# Patient Record
Sex: FEMALE
[De-identification: names, ages, dates, MRNs, and addresses within clinical notes are randomized per-mention and may not be internally consistent; named-entity substitution may affect disease eponyms.]

---

## 2018-04-29 PROBLEM — Z00.00 ENCOUNTER FOR PREVENTIVE HEALTH EXAMINATION: Status: ACTIVE | Noted: 2018-04-29

## 2018-05-08 ENCOUNTER — LABORATORY RESULT (OUTPATIENT)
Age: 43
End: 2018-05-08

## 2018-05-08 ENCOUNTER — APPOINTMENT (OUTPATIENT)
Dept: NEPHROLOGY | Facility: CLINIC | Age: 43
End: 2018-05-08
Payer: SELF-PAY

## 2018-05-08 VITALS — WEIGHT: 122 LBS | OXYGEN SATURATION: 98 % | HEART RATE: 74 BPM | BODY MASS INDEX: 26.32 KG/M2 | HEIGHT: 57 IN

## 2018-05-08 VITALS — HEART RATE: 72 BPM | SYSTOLIC BLOOD PRESSURE: 102 MMHG | DIASTOLIC BLOOD PRESSURE: 70 MMHG

## 2018-05-08 VITALS — HEART RATE: 84 BPM | DIASTOLIC BLOOD PRESSURE: 66 MMHG | SYSTOLIC BLOOD PRESSURE: 102 MMHG

## 2018-05-08 VITALS — SYSTOLIC BLOOD PRESSURE: 100 MMHG | HEART RATE: 84 BPM | DIASTOLIC BLOOD PRESSURE: 70 MMHG

## 2018-05-08 VITALS — HEIGHT: 59 IN | BODY MASS INDEX: 24.32 KG/M2 | WEIGHT: 120.63 LBS

## 2018-05-08 DIAGNOSIS — R21 RASH AND OTHER NONSPECIFIC SKIN ERUPTION: ICD-10-CM

## 2018-05-08 PROCEDURE — 93000 ELECTROCARDIOGRAM COMPLETE: CPT

## 2018-05-08 PROCEDURE — 99205 OFFICE O/P NEW HI 60 MIN: CPT | Mod: 25

## 2018-05-29 ENCOUNTER — LABORATORY RESULT (OUTPATIENT)
Age: 43
End: 2018-05-29

## 2018-05-29 ENCOUNTER — APPOINTMENT (OUTPATIENT)
Dept: NEUROLOGY | Facility: CLINIC | Age: 43
End: 2018-05-29
Payer: MEDICAID

## 2018-05-29 VITALS
HEART RATE: 57 BPM | OXYGEN SATURATION: 100 % | SYSTOLIC BLOOD PRESSURE: 128 MMHG | WEIGHT: 120 LBS | DIASTOLIC BLOOD PRESSURE: 84 MMHG | BODY MASS INDEX: 24.19 KG/M2 | HEIGHT: 59 IN | TEMPERATURE: 98.1 F

## 2018-05-29 DIAGNOSIS — Z81.8 FAMILY HISTORY OF OTHER MENTAL AND BEHAVIORAL DISORDERS: ICD-10-CM

## 2018-05-29 DIAGNOSIS — Z82.49 FAMILY HISTORY OF ISCHEMIC HEART DISEASE AND OTHER DISEASES OF THE CIRCULATORY SYSTEM: ICD-10-CM

## 2018-05-29 DIAGNOSIS — Z78.9 OTHER SPECIFIED HEALTH STATUS: ICD-10-CM

## 2018-05-29 DIAGNOSIS — Z84.89 FAMILY HISTORY OF OTHER SPECIFIED CONDITIONS: ICD-10-CM

## 2018-05-29 DIAGNOSIS — Z83.3 FAMILY HISTORY OF DIABETES MELLITUS: ICD-10-CM

## 2018-05-29 DIAGNOSIS — Z82.0 FAMILY HISTORY OF EPILEPSY AND OTHER DISEASES OF THE NERVOUS SYSTEM: ICD-10-CM

## 2018-05-29 DIAGNOSIS — Z82.3 FAMILY HISTORY OF STROKE: ICD-10-CM

## 2018-05-29 DIAGNOSIS — Z80.42 FAMILY HISTORY OF MALIGNANT NEOPLASM OF PROSTATE: ICD-10-CM

## 2018-05-29 DIAGNOSIS — Z83.49 FAMILY HISTORY OF OTHER ENDOCRINE, NUTRITIONAL AND METABOLIC DISEASES: ICD-10-CM

## 2018-05-29 PROCEDURE — 99204 OFFICE O/P NEW MOD 45 MIN: CPT

## 2018-05-29 RX ORDER — BUPROPION HYDROCHLORIDE 300 MG/1
300 TABLET, EXTENDED RELEASE ORAL DAILY
Refills: 0 | Status: ACTIVE | COMMUNITY

## 2018-05-29 RX ORDER — THYROID, PORCINE 15 MG/1
15 TABLET ORAL
Refills: 0 | Status: ACTIVE | COMMUNITY

## 2018-05-29 RX ORDER — IVABRADINE 5 MG/1
5 TABLET, FILM COATED ORAL
Refills: 0 | Status: ACTIVE | COMMUNITY

## 2018-06-20 ENCOUNTER — APPOINTMENT (OUTPATIENT)
Dept: NEUROLOGY | Facility: CLINIC | Age: 43
End: 2018-06-20
Payer: MEDICAID

## 2018-06-20 PROCEDURE — 95819 EEG AWAKE AND ASLEEP: CPT

## 2018-06-21 PROCEDURE — 95953: CPT

## 2018-06-22 ENCOUNTER — APPOINTMENT (OUTPATIENT)
Dept: NEUROLOGY | Facility: CLINIC | Age: 43
End: 2018-06-22

## 2018-06-22 PROCEDURE — 95953: CPT

## 2018-06-29 ENCOUNTER — APPOINTMENT (OUTPATIENT)
Dept: NEUROLOGY | Facility: CLINIC | Age: 43
End: 2018-06-29
Payer: MEDICAID

## 2018-06-29 PROCEDURE — 96118: CPT

## 2018-07-06 ENCOUNTER — APPOINTMENT (OUTPATIENT)
Dept: NEUROLOGY | Facility: CLINIC | Age: 43
End: 2018-07-06
Payer: MEDICAID

## 2018-07-06 PROCEDURE — 96118: CPT

## 2018-08-02 ENCOUNTER — APPOINTMENT (OUTPATIENT)
Dept: NEPHROLOGY | Facility: CLINIC | Age: 43
End: 2018-08-02
Payer: SELF-PAY

## 2018-08-02 ENCOUNTER — APPOINTMENT (OUTPATIENT)
Dept: ENDOCRINOLOGY | Facility: CLINIC | Age: 43
End: 2018-08-02
Payer: MEDICAID

## 2018-08-02 VITALS — OXYGEN SATURATION: 98 % | HEART RATE: 75 BPM | BODY MASS INDEX: 24.04 KG/M2 | WEIGHT: 119 LBS

## 2018-08-02 VITALS
WEIGHT: 120 LBS | DIASTOLIC BLOOD PRESSURE: 83 MMHG | BODY MASS INDEX: 24.19 KG/M2 | SYSTOLIC BLOOD PRESSURE: 124 MMHG | HEIGHT: 59 IN | HEART RATE: 79 BPM

## 2018-08-02 VITALS — HEART RATE: 84 BPM | SYSTOLIC BLOOD PRESSURE: 118 MMHG | DIASTOLIC BLOOD PRESSURE: 80 MMHG

## 2018-08-02 PROCEDURE — 99214 OFFICE O/P EST MOD 30 MIN: CPT | Mod: 25

## 2018-08-02 PROCEDURE — 36415 COLL VENOUS BLD VENIPUNCTURE: CPT

## 2018-08-02 PROCEDURE — 99205 OFFICE O/P NEW HI 60 MIN: CPT

## 2018-08-04 LAB
ALBUMIN SERPL ELPH-MCNC: 4.9 G/DL
ALDOSTERONE SERUM: <3 NG/DL
ALP BLD-CCNC: 50 U/L
ALT SERPL-CCNC: 9 U/L
ANION GAP SERPL CALC-SCNC: 16 MMOL/L
APPEARANCE: CLEAR
APTT BLD: 34.2 SEC
AST SERPL-CCNC: 17 U/L
BACTERIA: ABNORMAL
BASOPHILS # BLD AUTO: 0.01 K/UL
BASOPHILS NFR BLD AUTO: 0.1 %
BILIRUB SERPL-MCNC: 0.3 MG/DL
BILIRUBIN URINE: NEGATIVE
BLOOD URINE: NEGATIVE
BUN SERPL-MCNC: 9 MG/DL
CALCIUM SERPL-MCNC: 10.2 MG/DL
CHLORIDE SERPL-SCNC: 101 MMOL/L
CHOLEST SERPL-MCNC: 165 MG/DL
CHOLEST/HDLC SERPL: 2.4 RATIO
CO2 SERPL-SCNC: 21 MMOL/L
COLOR: YELLOW
CORTIS SERPL-MCNC: 9.5 UG/DL
CREAT SERPL-MCNC: 0.71 MG/DL
CRP SERPL-MCNC: 0.18 MG/DL
EOSINOPHIL # BLD AUTO: 0.09 K/UL
EOSINOPHIL NFR BLD AUTO: 1.1 %
ERYTHROCYTE [SEDIMENTATION RATE] IN BLOOD BY WESTERGREN METHOD: 5 MM/HR
GLUCOSE QUALITATIVE U: NEGATIVE MG/DL
GLUCOSE SERPL-MCNC: 81 MG/DL
HCT VFR BLD CALC: 40.2 %
HDLC SERPL-MCNC: 68 MG/DL
HGB BLD-MCNC: 12.9 G/DL
HYALINE CASTS: 1 /LPF
IMM GRANULOCYTES NFR BLD AUTO: 0.1 %
INR PPP: 1.01 RATIO
KETONES URINE: ABNORMAL
LDLC SERPL CALC-MCNC: 82 MG/DL
LEUKOCYTE ESTERASE URINE: ABNORMAL
LYMPHOCYTES # BLD AUTO: 2.15 K/UL
LYMPHOCYTES NFR BLD AUTO: 26.4 %
MAGNESIUM SERPL-MCNC: 2 MG/DL
MAN DIFF?: NORMAL
MCHC RBC-ENTMCNC: 26.1 PG
MCHC RBC-ENTMCNC: 32.1 GM/DL
MCV RBC AUTO: 81.4 FL
MICROSCOPIC-UA: NORMAL
MONOCYTES # BLD AUTO: 0.7 K/UL
MONOCYTES NFR BLD AUTO: 8.6 %
NEUTROPHILS # BLD AUTO: 5.17 K/UL
NEUTROPHILS NFR BLD AUTO: 63.7 %
NITRITE URINE: NEGATIVE
PH URINE: 7.5
PHOSPHATE SERPL-MCNC: 2.3 MG/DL
PLATELET # BLD AUTO: 284 K/UL
POTASSIUM SERPL-SCNC: 3.8 MMOL/L
PROT SERPL-MCNC: 8.2 G/DL
PROTEIN URINE: NEGATIVE MG/DL
PT BLD: 11.4 SEC
RBC # BLD: 4.94 M/UL
RBC # FLD: 14 %
RED BLOOD CELLS URINE: 0 /HPF
RENIN PLASMA: <2.1 PG/ML
SODIUM SERPL-SCNC: 138 MMOL/L
SPECIFIC GRAVITY URINE: 1.01
SQUAMOUS EPITHELIAL CELLS: 3 /HPF
TRIGL SERPL-MCNC: 77 MG/DL
URATE SERPL-MCNC: 4.6 MG/DL
UROBILINOGEN URINE: NEGATIVE MG/DL
WBC # FLD AUTO: 8.13 K/UL
WHITE BLOOD CELLS URINE: 1 /HPF

## 2018-08-07 ENCOUNTER — APPOINTMENT (OUTPATIENT)
Dept: NEUROLOGY | Facility: CLINIC | Age: 43
End: 2018-08-07
Payer: MEDICAID

## 2018-08-07 VITALS
WEIGHT: 120 LBS | DIASTOLIC BLOOD PRESSURE: 73 MMHG | HEIGHT: 59 IN | BODY MASS INDEX: 24.19 KG/M2 | SYSTOLIC BLOOD PRESSURE: 112 MMHG | OXYGEN SATURATION: 100 % | HEART RATE: 67 BPM | TEMPERATURE: 98.3 F

## 2018-08-07 PROCEDURE — 99214 OFFICE O/P EST MOD 30 MIN: CPT

## 2018-09-14 ENCOUNTER — RX RENEWAL (OUTPATIENT)
Age: 43
End: 2018-09-14

## 2018-11-08 ENCOUNTER — INPATIENT (INPATIENT)
Facility: HOSPITAL | Age: 43
LOS: 0 days | Discharge: HOME | DRG: 93 | End: 2018-11-09
Attending: PSYCHIATRY & NEUROLOGY | Admitting: PSYCHIATRY & NEUROLOGY
Payer: COMMERCIAL

## 2018-11-08 VITALS
OXYGEN SATURATION: 98 % | TEMPERATURE: 98 F | WEIGHT: 120.15 LBS | RESPIRATION RATE: 18 BRPM | SYSTOLIC BLOOD PRESSURE: 137 MMHG | DIASTOLIC BLOOD PRESSURE: 89 MMHG | HEART RATE: 81 BPM

## 2018-11-08 DIAGNOSIS — R20.0 ANESTHESIA OF SKIN: ICD-10-CM

## 2018-11-08 DIAGNOSIS — R00.0 TACHYCARDIA, UNSPECIFIED: ICD-10-CM

## 2018-11-08 DIAGNOSIS — G62.9 POLYNEUROPATHY, UNSPECIFIED: ICD-10-CM

## 2018-11-08 LAB
ALBUMIN SERPL ELPH-MCNC: 4.5 G/DL — SIGNIFICANT CHANGE UP (ref 3.3–5)
ALP SERPL-CCNC: 46 U/L — SIGNIFICANT CHANGE UP (ref 40–120)
ALT FLD-CCNC: 12 U/L — SIGNIFICANT CHANGE UP (ref 10–45)
ANION GAP SERPL CALC-SCNC: 13 MMOL/L — SIGNIFICANT CHANGE UP (ref 5–17)
APTT BLD: 34.5 SEC — SIGNIFICANT CHANGE UP (ref 27.5–36.3)
AST SERPL-CCNC: 16 U/L — SIGNIFICANT CHANGE UP (ref 10–40)
BASOPHILS NFR BLD AUTO: 0.2 % — SIGNIFICANT CHANGE UP (ref 0–2)
BILIRUB SERPL-MCNC: 0.2 MG/DL — SIGNIFICANT CHANGE UP (ref 0.2–1.2)
BUN SERPL-MCNC: 9 MG/DL — SIGNIFICANT CHANGE UP (ref 7–23)
CALCIUM SERPL-MCNC: 9.7 MG/DL — SIGNIFICANT CHANGE UP (ref 8.4–10.5)
CHLORIDE SERPL-SCNC: 98 MMOL/L — SIGNIFICANT CHANGE UP (ref 96–108)
CHOLEST SERPL-MCNC: 189 MG/DL — SIGNIFICANT CHANGE UP (ref 10–199)
CO2 SERPL-SCNC: 25 MMOL/L — SIGNIFICANT CHANGE UP (ref 22–31)
CREAT SERPL-MCNC: 0.67 MG/DL — SIGNIFICANT CHANGE UP (ref 0.5–1.3)
EOSINOPHIL NFR BLD AUTO: 1.1 % — SIGNIFICANT CHANGE UP (ref 0–6)
GLUCOSE SERPL-MCNC: 106 MG/DL — HIGH (ref 70–99)
HCT VFR BLD CALC: 36.5 % — SIGNIFICANT CHANGE UP (ref 34.5–45)
HDLC SERPL-MCNC: 81 MG/DL — SIGNIFICANT CHANGE UP
HGB BLD-MCNC: 11.7 G/DL — SIGNIFICANT CHANGE UP (ref 11.5–15.5)
INR BLD: 1.09 — SIGNIFICANT CHANGE UP (ref 0.88–1.16)
LIPID PNL WITH DIRECT LDL SERPL: 97 MG/DL — SIGNIFICANT CHANGE UP
LYMPHOCYTES # BLD AUTO: 31.5 % — SIGNIFICANT CHANGE UP (ref 13–44)
MCHC RBC-ENTMCNC: 25.7 PG — LOW (ref 27–34)
MCHC RBC-ENTMCNC: 32.1 G/DL — SIGNIFICANT CHANGE UP (ref 32–36)
MCV RBC AUTO: 80.2 FL — SIGNIFICANT CHANGE UP (ref 80–100)
MONOCYTES NFR BLD AUTO: 8.9 % — SIGNIFICANT CHANGE UP (ref 2–14)
NEUTROPHILS NFR BLD AUTO: 58.3 % — SIGNIFICANT CHANGE UP (ref 43–77)
PLATELET # BLD AUTO: 277 K/UL — SIGNIFICANT CHANGE UP (ref 150–400)
POTASSIUM SERPL-MCNC: 4.1 MMOL/L — SIGNIFICANT CHANGE UP (ref 3.5–5.3)
POTASSIUM SERPL-SCNC: 4.1 MMOL/L — SIGNIFICANT CHANGE UP (ref 3.5–5.3)
PROT SERPL-MCNC: 7.5 G/DL — SIGNIFICANT CHANGE UP (ref 6–8.3)
PROTHROM AB SERPL-ACNC: 12.4 SEC — SIGNIFICANT CHANGE UP (ref 10–12.9)
RBC # BLD: 4.55 M/UL — SIGNIFICANT CHANGE UP (ref 3.8–5.2)
RBC # FLD: 13.6 % — SIGNIFICANT CHANGE UP (ref 10.3–16.9)
SODIUM SERPL-SCNC: 136 MMOL/L — SIGNIFICANT CHANGE UP (ref 135–145)
TOTAL CHOLESTEROL/HDL RATIO MEASUREMENT: 2.3 RATIO — LOW (ref 3.3–7.1)
TRIGL SERPL-MCNC: 57 MG/DL — SIGNIFICANT CHANGE UP (ref 10–149)
TROPONIN T SERPL-MCNC: <0.01 NG/ML — SIGNIFICANT CHANGE UP (ref 0–0.01)
WBC # BLD: 8 K/UL — SIGNIFICANT CHANGE UP (ref 3.8–10.5)
WBC # FLD AUTO: 8 K/UL — SIGNIFICANT CHANGE UP (ref 3.8–10.5)

## 2018-11-08 PROCEDURE — 70496 CT ANGIOGRAPHY HEAD: CPT | Mod: 26

## 2018-11-08 PROCEDURE — 70450 CT HEAD/BRAIN W/O DYE: CPT | Mod: 26,59

## 2018-11-08 PROCEDURE — 70498 CT ANGIOGRAPHY NECK: CPT | Mod: 26

## 2018-11-08 PROCEDURE — 99285 EMERGENCY DEPT VISIT HI MDM: CPT | Mod: 25

## 2018-11-08 PROCEDURE — 93010 ELECTROCARDIOGRAM REPORT: CPT

## 2018-11-08 PROCEDURE — 0042T: CPT

## 2018-11-08 RX ORDER — SODIUM CHLORIDE 9 MG/ML
1000 INJECTION, SOLUTION INTRAVENOUS EVERY 12 HOURS
Qty: 0 | Refills: 0 | Status: DISCONTINUED | OUTPATIENT
Start: 2018-11-08 | End: 2018-11-09

## 2018-11-08 RX ORDER — ASPIRIN/CALCIUM CARB/MAGNESIUM 324 MG
81 TABLET ORAL DAILY
Qty: 0 | Refills: 0 | Status: DISCONTINUED | OUTPATIENT
Start: 2018-11-08 | End: 2018-11-09

## 2018-11-08 RX ADMIN — Medication 81 MILLIGRAM(S): at 23:37

## 2018-11-08 NOTE — ED PROVIDER NOTE - OBJECTIVE STATEMENT
Pt w/ PMHx POTS, Hashimoto's thyroiditis, depression p/w numbness onset 9 pm last night. Pt reports numbness of the R side of the face, from the level of the eyebrow to the chin. Pt reports family members noted her face looked funny at that time, unable to state how, and that she had some stuttering. Pt reports she ignored the sx and went to bed. This morning she felt better. Today, around 1 pm she felt the numbness get worse. Pt called Dr Aguilar this evening and was advised to come to the ED. No HA, neck pain, visual changes, extremity paresthesias, or focal weakness. Pt w/ PMHx POTS, Hashimoto's thyroiditis, depression, small fiber neuropathy s/p IVIG 2 months ago, p/w numbness onset 9 pm last night. Pt reports numbness of the R side of the face, from the level of the eyebrow to the chin. Pt reports family members noted her face looked funny at that time, unable to state how, and that she had some stuttering. Pt reports she ignored the sx and went to bed. This morning she felt better. Today, around 1 pm she felt the numbness get worse. Pt called Dr Aguilar this evening and was advised to come to the ED. No HA, neck pain, visual changes, extremity paresthesias, or focal weakness.

## 2018-11-08 NOTE — ED PROVIDER NOTE - PHYSICAL EXAMINATION
Constitutional: Well appearing, well nourished, awake, alert, oriented to person, place, time/situation and in no apparent distress.  ENMT: Airway patent. Normal MM  Eyes: Clear bilaterally  Cardiac: Normal rate, regular rhythm.  Heart sounds S1, S2.  No murmurs, rubs or gallops.  Respiratory: Breaths sounds equal and clear b/l. No increased WOB, tachypnea, hypoxia, or accessory mm use. Pt speaks in full sentences.   Gastrointestinal: Abd soft, NT, ND, NABS. No guarding, rebound, or rigidity. No pulsatile abdominal masses. No organomegaly appreciated. No CVAT   Musculoskeletal: Range of motion is not limited  Neuro: Alert & Oriented x 3. CN II-XII intact. No facial droop. Clear speech. PEOPLES w/ 5/5 strength x 4 ext. Mild dec sensation to R face V2-3. No pronator drift. No dysdidokinesia nor dysmetria. Normal heel-to-shin. Normal visual fields. NIHSS 1.  Skin: Skin normal color for race, warm, dry and intact. No evidence of rash.  Psych: Alert and oriented to person, place, time/situation. normal mood and affect. no apparent risk to self or others.

## 2018-11-08 NOTE — H&P ADULT - HISTORY OF PRESENT ILLNESS
42 y/o F with PMH POT syndrome and small fiber neuropathy. 44 y/o F with PMH POT syndrome and small fiber neuropathy who presents for right sided facial numbness starting at 9PM on 11/7. She noticed numbness from the eye brow to the chin felt numb, no associated pain, no visual changes, no dizziness. She reported that her face appeared to have a right droop. She took her BP and it was normal so she was not concerned and went to sleep. The next morning there was some resolution however it returned. She called Dr. Aguilar (her PCP) who directed her to go to the ED.     In the [facility] ED, patient's vitals were stable; afebrile, HR in the 80's, SBP to 130's and saturating well on room air. Her NIHSS was 1 for sensation discrepancy between right and left side of lower face. No associated droop. CT head negative for signs of acute transcortical stroke or hemorrhage. Patient +mild dizziness, tiredness, ongoing right sided numbness in her lower face. ROS negative for visual changes, ataxia, dysarthria or aphasia, nausea, vomiting. No similar episodes in the past.

## 2018-11-08 NOTE — H&P ADULT - ASSESSMENT
44 y/o F with PMH POT syndrome and small fiber neuropathy who presents with one day of persistent right sided facial numbness - NIHSS 1 and CT head negative for stroke - will admit for neurological work up.

## 2018-11-08 NOTE — STROKE CODE NOTE - NIH STROKE SCALE: 8. SENSORY, QM
(0) Normal; no sensory loss (1) Mild-to-moderate sensory loss; patient feels pinprick is less sharp or is dull on the affected side; or there is a loss of superficial pain with pinprick, but patient is aware of being touched

## 2018-11-08 NOTE — ED PROVIDER NOTE - MEDICAL DECISION MAKING DETAILS
Pt p/w numbness, ? facial droop and stuttering yesterday. NIHSS 0 on exam. Stroke code activated in accordance w/ [facility] protocol. FS / BP wnl. Stat CT, stroke eval. Dispo pending w/u and clinical status Pt p/w numbness, ? facial droop and stuttering yesterday. NIHSS 1 on exam. Stroke code activated in accordance w/ [facility] protocol. FS / BP wnl. Stat CT, stroke eval. Dispo pending w/u and clinical status

## 2018-11-08 NOTE — CONSULT NOTE ADULT - SUBJECTIVE AND OBJECTIVE BOX
Patient is a 43 year old female with past medical history of POT-syndrome and small fiber neuropathy who presents with acute onset right sided facial numbness starting at 9PM on November 7.     Patient says that yesterday evening she noticed that the right side of her face from the eye brow to chin felt numb, no pain associated, no visual changes, dizziness. Did recall that her face looked "puffy" and that her right eye lid seemed to droop. She ignored the symptoms until this morning. Over the course of the day the sensation of numbness persisted in her right cheek (droop resolved) and she felt like it was difficult/tiring to pronounce words - no slurring or difficulty with word finding. Given ongoing symptoms, patient presented to [facility] ED.     In the [facility] ED, patient's vitals were stable; afebrile, HR in the 80's, SBP to 130's and saturating well on room air. Her NIHSS was 1 for sensation discrepancy between right and left side of lower face. No associated droop. CT head negative for signs of acute transcortical stroke or hemorrhage. Patient +mild dizziness, tiredness, ongoing right sided numbness in her lower face. ROS negative for visual changes, ataxia, dysarthria or aphasia, nausea, vomiting.     ICU Vital Signs Last 24 Hrs  T(C): 36.9 (08 Nov 2018 21:44), Max: 36.9 (08 Nov 2018 21:44)  T(F): 98.4 (08 Nov 2018 21:44), Max: 98.4 (08 Nov 2018 21:44)  HR: 75 (08 Nov 2018 21:44) (75 - 81)  BP: 125/78 (08 Nov 2018 21:44) (125/78 - 137/89)  RR: 18 (08 Nov 2018 21:44) (18 - 18)  SpO2: 99% (08 Nov 2018 21:44) (98% - 99%)    PHYSICAL EXAM:  Constitutional: WDWN resting comfortably in bed; NAD  Head: NC/AT  Eyes: PERRL, EOMI, anicteric sclera  ENT: no nasal discharge; uvula midline, no oropharyngeal erythema or exudates; MMM  Neck: supple; no JVD or thyromegaly  Respiratory: CTA B/L; no W/R/R, no retractions  Cardiac: +S1/S2; RRR; no M/R/G; PMI non-displaced  Gastrointestinal: soft, NT/ND; no rebound or guarding; +BSx4  Extremities: WWP, no clubbing or cyanosis; no peripheral edema  Vascular: 2+ radial, femoral, DP/PT pulses B/L  Dermatologic: skin warm, dry and intact; no rashes, wounds, or scars  Lymphatic: no submandibular or cervical LAD  Neurologic: AAOx3; CNII-XII grossly intact; strength 5/5 throughout, sensation intact and symmetric to light touch and pin prick with exception of right face; says sensation is dulled. No pain elicited from touch to area. No ataxia or indication of cerebellar deficit. Cognition, judgment, memory intact.   Psychiatric: affect and characteristics of appearance, verbalizations, behaviors are appropriate    LABS:                         11.7   8.0   )-----------( 277      ( 08 Nov 2018 21:09 )             36.5     11-08    136  |  98  |  9   ----------------------------<  106<H>  4.1   |  25  |  0.67    Ca    9.7      08 Nov 2018 21:09    TPro  7.5  /  Alb  4.5  /  TBili  0.2  /  DBili  x   /  AST  16  /  ALT  12  /  AlkPhos  46  11-08    PT/INR - ( 08 Nov 2018 21:09 )   PT: 12.4 sec;   INR: 1.09     PTT - ( 08 Nov 2018 21:09 )  PTT:34.5 sec    CARDIAC MARKERS ( 08 Nov 2018 21:09 )  x     / <0.01 ng/mL / x     / x     / x          RADIOLOGY, EKG & ADDITIONAL TESTS: Reviewed.     < from: CT Brain Stroke Protocol (11.08.18 @ 21:34) >  IMPRESSION: No intracranial hemorrhage or acute transcortical infarct.    < end of copied text > Patient is a 43 year old female with past medical history of POT-syndrome and small fiber neuropathy who presents with acute onset right sided facial numbness starting at 9PM on November 7.     Patient says that yesterday evening she noticed that the right side of her face from the eye brow to chin felt numb, no pain associated, no visual changes, dizziness. Did recall that her face looked "puffy" and that her right eye lid seemed to droop. She ignored the symptoms and went to sleep. This morning, and over the course of the day, the sensation of numbness persisted in her right cheek (droop resolved) and she felt like it was difficult/tiring to pronounce words - no slurring or difficulty with word finding. Given ongoing symptoms, patient presented to [facility] ED.     In the [facility] ED, patient's vitals were stable; afebrile, HR in the 80's, SBP to 130's and saturating well on room air. Her NIHSS was 1 for sensation discrepancy between right and left side of lower face. No associated droop. CT head negative for signs of acute transcortical stroke or hemorrhage. Patient +mild dizziness, tiredness, ongoing right sided numbness in her lower face. ROS negative for visual changes, ataxia, dysarthria or aphasia, nausea, vomiting. No similar episodes in the past.     PMHx: POTS, small fiber neruopathy,   Fam Hx: noncontributory  Surgical Hx: none  Meds: mestinon, corlanor, armor thyroid, wellbutrin  Allergies: NKA  Social Hx: Denies EtOH, smoking or IVDU. Lives with nuclear family, performs ADLs independently.  ROS: + dizziness, right sided facial numbness      ICU Vital Signs Last 24 Hrs  T(C): 36.9 (08 Nov 2018 21:44), Max: 36.9 (08 Nov 2018 21:44)  T(F): 98.4 (08 Nov 2018 21:44), Max: 98.4 (08 Nov 2018 21:44)  HR: 75 (08 Nov 2018 21:44) (75 - 81)  BP: 125/78 (08 Nov 2018 21:44) (125/78 - 137/89)  RR: 18 (08 Nov 2018 21:44) (18 - 18)  SpO2: 99% (08 Nov 2018 21:44) (98% - 99%)    PHYSICAL EXAM:  Constitutional: WDWN resting comfortably in bed; NAD  Head: NC/AT  Eyes: PERRL, EOMI, anicteric sclera  ENT: no nasal discharge; uvula midline, no oropharyngeal erythema or exudates; MMM  Neck: supple; no JVD or thyromegaly  Respiratory: CTA B/L; no W/R/R, no retractions  Cardiac: +S1/S2; RRR; no M/R/G; PMI non-displaced  Gastrointestinal: soft, NT/ND; no rebound or guarding; +BSx4  Extremities: WWP, no clubbing or cyanosis; no peripheral edema  Vascular: 2+ radial, femoral, DP/PT pulses B/L  Dermatologic: skin warm, dry and intact; no rashes, wounds, or scars  Lymphatic: no submandibular or cervical LAD  Neurologic: AAOx3; CNII-XII grossly intact; strength 5/5 throughout, sensation intact and symmetric to light touch and pin prick with exception of right face; says sensation is dulled. No pain elicited from touch to area. No ataxia or indication of cerebellar deficit. Cognition, judgment, memory intact. +long-standing tremor in right hand.   Psychiatric: affect and characteristics of appearance, verbalizations, behaviors are appropriate    LABS:                         11.7   8.0   )-----------( 277      ( 08 Nov 2018 21:09 )             36.5     11-08    136  |  98  |  9   ----------------------------<  106<H>  4.1   |  25  |  0.67    Ca    9.7      08 Nov 2018 21:09    TPro  7.5  /  Alb  4.5  /  TBili  0.2  /  DBili  x   /  AST  16  /  ALT  12  /  AlkPhos  46  11-08    PT/INR - ( 08 Nov 2018 21:09 )   PT: 12.4 sec;   INR: 1.09     PTT - ( 08 Nov 2018 21:09 )  PTT:34.5 sec    CARDIAC MARKERS ( 08 Nov 2018 21:09 )  x     / <0.01 ng/mL / x     / x     / x          RADIOLOGY, EKG & ADDITIONAL TESTS: Reviewed.     CT Brain Stroke Protocol (11.08.18 @ 21:34)   IMPRESSION: No intracranial hemorrhage or acute transcortical infarct.    CT Angio Head w/ IV Cont (11.08.18 @ 21:33)  IMPRESSION: Normal CTA examination of the brain.    CT Angio Neck w/ IV Cont (11.08.18 @ 21:33)   IMPRESSION: Normal CTA examination of the brain.

## 2018-11-08 NOTE — H&P ADULT - NSHPLABSRESULTS_GEN_ALL_CORE
.  LABS:                         11.7   8.0   )-----------( 277      ( 08 Nov 2018 21:09 )             36.5     11-08    136  |  98  |  9   ----------------------------<  106<H>  4.1   |  25  |  0.67    Ca    9.7      08 Nov 2018 21:09    TPro  7.5  /  Alb  4.5  /  TBili  0.2  /  DBili  x   /  AST  16  /  ALT  12  /  AlkPhos  46  11-08    PT/INR - ( 08 Nov 2018 21:09 )   PT: 12.4 sec;   INR: 1.09          PTT - ( 08 Nov 2018 21:09 )  PTT:34.5 sec    CARDIAC MARKERS ( 08 Nov 2018 21:09 )  x     / <0.01 ng/mL / x     / x     / x                RADIOLOGY, EKG & ADDITIONAL TESTS: Reviewed.

## 2018-11-08 NOTE — ED ADULT TRIAGE NOTE - CHIEF COMPLAINT QUOTE
pt complaining of right sided facial numbness since last night at 9pm with head ache no vision changes admits to nausea no vomiting. Denies CP SOB lightheadedness dizziness weakness fever chills. No facial droop noted walks with steady gait b/l equal hand grasps noted

## 2018-11-08 NOTE — H&P ADULT - PROBLEM SELECTOR PLAN 1
Right facial numbness since 9PM yesterday. Stroke code called NIHSS 1 for loss of sensation to right side of face. CT head negative. Admitted for rule out demyelinating disease vs early signs of Bell's palsy.   - ASA 81mg x1  - MRI head with and without IV contrast

## 2018-11-08 NOTE — H&P ADULT - NSHPPHYSICALEXAM_GEN_ALL_CORE
.  VITAL SIGNS:  T(C): 36.9 (11-08-18 @ 21:44), Max: 36.9 (11-08-18 @ 21:44)  T(F): 98.4 (11-08-18 @ 21:44), Max: 98.4 (11-08-18 @ 21:44)  HR: 75 (11-08-18 @ 21:44) (75 - 81)  BP: 125/78 (11-08-18 @ 21:44) (125/78 - 137/89)  BP(mean): --  RR: 18 (11-08-18 @ 21:44) (18 - 18)  SpO2: 99% (11-08-18 @ 21:44) (98% - 99%)  Wt(kg): --    PHYSICAL EXAM:    Constitutional: WDWN resting comfortably in bed; NAD  Head: NC/AT  Eyes: PERRL, EOMI, anicteric sclera  ENT: no nasal discharge; uvula midline, no oropharyngeal erythema or exudates; MMM  Neck: supple; no JVD or thyromegaly  Respiratory: CTA B/L; no W/R/R, no retractions  Cardiac: +S1/S2; RRR; no M/R/G; PMI non-displaced  Gastrointestinal: abdomen soft, NT/ND; no rebound or guarding; +BSx4  Back: spine midline, no bony tenderness or step-offs; no CVAT B/L  Extremities: WWP, no clubbing or cyanosis; no peripheral edema  Musculoskeletal: NROM x4; no joint swelling, tenderness or erythema  Vascular: 2+ radial, femoral, DP/PT pulses B/L  Dermatologic: skin warm, dry and intact; no rashes, wounds, or scars  Lymphatic: no submandibular or cervical LAD  Neurologic: AAOx3; CNII-XII grossly intact; no focal deficits  Psychiatric: affect and characteristics of appearance, verbalizations, behaviors are appropriate .  VITAL SIGNS:  T(C): 36.9 (11-08-18 @ 21:44), Max: 36.9 (11-08-18 @ 21:44)  T(F): 98.4 (11-08-18 @ 21:44), Max: 98.4 (11-08-18 @ 21:44)  HR: 75 (11-08-18 @ 21:44) (75 - 81)  BP: 125/78 (11-08-18 @ 21:44) (125/78 - 137/89)  BP(mean): --  RR: 18 (11-08-18 @ 21:44) (18 - 18)  SpO2: 99% (11-08-18 @ 21:44) (98% - 99%)  Wt(kg): --    PHYSICAL EXAM:    Constitutional: WDWN resting comfortably in bed; NAD  Head: NC/AT  Eyes: PERRL, EOMI, anicteric sclera  ENT: no nasal discharge; uvula midline, no oropharyngeal erythema or exudates; MMM  Neck: supple; no JVD or thyromegaly  Respiratory: CTA B/L; no W/R/R, no retractions  Cardiac: +S1/S2; RRR; no M/R/G; PMI non-displaced  Gastrointestinal: soft, NT/ND; no rebound or guarding; +BSx4  Extremities: WWP, no clubbing or cyanosis; no peripheral edema  Vascular: 2+ radial, femoral, DP/PT pulses B/L  Dermatologic: skin warm, dry and intact; no rashes, wounds, or scars  Lymphatic: no submandibular or cervical LAD  Neurologic: AAOx3; CNII-XII grossly intact; strength 5/5 throughout. sensation is intact and symmetric to light touch and pin prick with exception of right face; says sensation is dulled. No pain elicited from touch to area. No ataxia or indication of cerebellar deficit. Cognition, judgment, memory intact. +long-standing tremor in right hand.   Psychiatric: affect and characteristics of appearance, verbalizations, behaviors are appropriate

## 2018-11-08 NOTE — CONSULT NOTE ADULT - ASSESSMENT
Patient i Patient is a 43 year old female with past medical history of POTS, small fiber neuropathy who presents with one day of acute onset, stable and persistent right sided facial numbness. NIHSS 1 to reflect symptom and CT head negative for infarction.     1. Right sided facial numbness  NIHSS 1, CT head negative, . Vitals WNL. Symptoms not worsening, but ongoing and persistent. Low suspicion for stroke. Given no pain to palpation unlikely trigeminal neuralgia. Possibly cluster headache v. early stages of bells palsy v. demyelinating disease.   -aspirin 81mg x1   -MRI of the head   -no blood pressure goals  -admit under neurology service to a non-monitored unit while undergoing workup

## 2018-11-09 ENCOUNTER — TRANSCRIPTION ENCOUNTER (OUTPATIENT)
Age: 43
End: 2018-11-09

## 2018-11-09 VITALS
SYSTOLIC BLOOD PRESSURE: 119 MMHG | HEART RATE: 71 BPM | RESPIRATION RATE: 16 BRPM | TEMPERATURE: 98 F | OXYGEN SATURATION: 99 % | DIASTOLIC BLOOD PRESSURE: 77 MMHG

## 2018-11-09 DIAGNOSIS — E03.9 HYPOTHYROIDISM, UNSPECIFIED: ICD-10-CM

## 2018-11-09 DIAGNOSIS — K59.00 CONSTIPATION, UNSPECIFIED: ICD-10-CM

## 2018-11-09 DIAGNOSIS — Z29.9 ENCOUNTER FOR PROPHYLACTIC MEASURES, UNSPECIFIED: ICD-10-CM

## 2018-11-09 DIAGNOSIS — R63.8 OTHER SYMPTOMS AND SIGNS CONCERNING FOOD AND FLUID INTAKE: ICD-10-CM

## 2018-11-09 LAB
ANION GAP SERPL CALC-SCNC: 16 MMOL/L — SIGNIFICANT CHANGE UP (ref 5–17)
APPEARANCE UR: CLEAR — SIGNIFICANT CHANGE UP
BILIRUB UR-MCNC: NEGATIVE — SIGNIFICANT CHANGE UP
BUN SERPL-MCNC: 10 MG/DL — SIGNIFICANT CHANGE UP (ref 7–23)
CALCIUM SERPL-MCNC: 9.3 MG/DL — SIGNIFICANT CHANGE UP (ref 8.4–10.5)
CHLORIDE SERPL-SCNC: 100 MMOL/L — SIGNIFICANT CHANGE UP (ref 96–108)
CO2 SERPL-SCNC: 24 MMOL/L — SIGNIFICANT CHANGE UP (ref 22–31)
COLOR SPEC: YELLOW — SIGNIFICANT CHANGE UP
CREAT SERPL-MCNC: 0.68 MG/DL — SIGNIFICANT CHANGE UP (ref 0.5–1.3)
DIFF PNL FLD: NEGATIVE — SIGNIFICANT CHANGE UP
GLUCOSE SERPL-MCNC: 93 MG/DL — SIGNIFICANT CHANGE UP (ref 70–99)
GLUCOSE UR QL: NEGATIVE — SIGNIFICANT CHANGE UP
HCT VFR BLD CALC: 35.5 % — SIGNIFICANT CHANGE UP (ref 34.5–45)
HGB BLD-MCNC: 11.1 G/DL — LOW (ref 11.5–15.5)
KETONES UR-MCNC: NEGATIVE — SIGNIFICANT CHANGE UP
LEUKOCYTE ESTERASE UR-ACNC: NEGATIVE — SIGNIFICANT CHANGE UP
MAGNESIUM SERPL-MCNC: 2.1 MG/DL — SIGNIFICANT CHANGE UP (ref 1.6–2.6)
MCHC RBC-ENTMCNC: 25.3 PG — LOW (ref 27–34)
MCHC RBC-ENTMCNC: 31.3 G/DL — LOW (ref 32–36)
MCV RBC AUTO: 81.1 FL — SIGNIFICANT CHANGE UP (ref 80–100)
NITRITE UR-MCNC: NEGATIVE — SIGNIFICANT CHANGE UP
PCP SPEC-MCNC: SIGNIFICANT CHANGE UP
PH UR: 7 — SIGNIFICANT CHANGE UP (ref 5–8)
PLATELET # BLD AUTO: 223 K/UL — SIGNIFICANT CHANGE UP (ref 150–400)
POTASSIUM SERPL-MCNC: 3.5 MMOL/L — SIGNIFICANT CHANGE UP (ref 3.5–5.3)
POTASSIUM SERPL-SCNC: 3.5 MMOL/L — SIGNIFICANT CHANGE UP (ref 3.5–5.3)
PROT UR-MCNC: NEGATIVE MG/DL — SIGNIFICANT CHANGE UP
RBC # BLD: 4.38 M/UL — SIGNIFICANT CHANGE UP (ref 3.8–5.2)
RBC # FLD: 13.7 % — SIGNIFICANT CHANGE UP (ref 10.3–16.9)
SODIUM SERPL-SCNC: 140 MMOL/L — SIGNIFICANT CHANGE UP (ref 135–145)
SP GR SPEC: <=1.005 — SIGNIFICANT CHANGE UP (ref 1–1.03)
TSH SERPL-MCNC: 0.58 UIU/ML — SIGNIFICANT CHANGE UP (ref 0.35–4.94)
UROBILINOGEN FLD QL: 0.2 E.U./DL — SIGNIFICANT CHANGE UP
WBC # BLD: 5.9 K/UL — SIGNIFICANT CHANGE UP (ref 3.8–10.5)
WBC # FLD AUTO: 5.9 K/UL — SIGNIFICANT CHANGE UP (ref 3.8–10.5)

## 2018-11-09 PROCEDURE — 99223 1ST HOSP IP/OBS HIGH 75: CPT

## 2018-11-09 PROCEDURE — 93010 ELECTROCARDIOGRAM REPORT: CPT

## 2018-11-09 RX ORDER — THYROID 120 MG
1 TABLET ORAL
Qty: 0 | Refills: 0 | DISCHARGE
Start: 2018-11-09

## 2018-11-09 RX ORDER — THYROID 120 MG
45 TABLET ORAL
Qty: 0 | Refills: 0 | Status: DISCONTINUED | OUTPATIENT
Start: 2018-11-09 | End: 2018-11-09

## 2018-11-09 RX ORDER — PYRIDOSTIGMINE BROMIDE 60 MG/5ML
1 SOLUTION ORAL
Qty: 0 | Refills: 0 | DISCHARGE
Start: 2018-11-09

## 2018-11-09 RX ORDER — PYRIDOSTIGMINE BROMIDE 60 MG/5ML
60 SOLUTION ORAL DAILY
Qty: 0 | Refills: 0 | Status: DISCONTINUED | OUTPATIENT
Start: 2018-11-09 | End: 2018-11-09

## 2018-11-09 RX ORDER — ASPIRIN/CALCIUM CARB/MAGNESIUM 324 MG
1 TABLET ORAL
Qty: 0 | Refills: 0 | DISCHARGE
Start: 2018-11-09

## 2018-11-09 RX ORDER — INFLUENZA VIRUS VACCINE 15; 15; 15; 15 UG/.5ML; UG/.5ML; UG/.5ML; UG/.5ML
0.5 SUSPENSION INTRAMUSCULAR ONCE
Qty: 0 | Refills: 0 | Status: COMPLETED | OUTPATIENT
Start: 2018-11-09 | End: 2018-11-09

## 2018-11-09 RX ORDER — POLYETHYLENE GLYCOL 3350 17 G/17G
35 POWDER, FOR SOLUTION ORAL AT BEDTIME
Qty: 0 | Refills: 0 | Status: DISCONTINUED | OUTPATIENT
Start: 2018-11-09 | End: 2018-11-09

## 2018-11-09 RX ORDER — CHLORHEXIDINE GLUCONATE 213 G/1000ML
1 SOLUTION TOPICAL DAILY
Qty: 0 | Refills: 0 | Status: DISCONTINUED | OUTPATIENT
Start: 2018-11-09 | End: 2018-11-09

## 2018-11-09 RX ORDER — BUPROPION HYDROCHLORIDE 150 MG/1
1 TABLET, EXTENDED RELEASE ORAL
Qty: 0 | Refills: 0 | DISCHARGE
Start: 2018-11-09

## 2018-11-09 RX ORDER — IVABRADINE 7.5 MG/1
1 TABLET, FILM COATED ORAL
Qty: 0 | Refills: 0 | DISCHARGE
Start: 2018-11-09

## 2018-11-09 RX ORDER — IVABRADINE 7.5 MG/1
5 TABLET, FILM COATED ORAL
Qty: 0 | Refills: 0 | Status: DISCONTINUED | OUTPATIENT
Start: 2018-11-09 | End: 2018-11-09

## 2018-11-09 RX ORDER — BUPROPION HYDROCHLORIDE 150 MG/1
300 TABLET, EXTENDED RELEASE ORAL DAILY
Qty: 0 | Refills: 0 | Status: DISCONTINUED | OUTPATIENT
Start: 2018-11-09 | End: 2018-11-09

## 2018-11-09 RX ORDER — THYROID 120 MG
0 TABLET ORAL
Qty: 0 | Refills: 0 | DISCHARGE
Start: 2018-11-09

## 2018-11-09 RX ORDER — THYROID 120 MG
3 TABLET ORAL
Qty: 0 | Refills: 0 | DISCHARGE
Start: 2018-11-09

## 2018-11-09 RX ADMIN — IVABRADINE 5 MILLIGRAM(S): 7.5 TABLET, FILM COATED ORAL at 11:18

## 2018-11-09 RX ADMIN — SODIUM CHLORIDE 1000 MILLILITER(S): 9 INJECTION, SOLUTION INTRAVENOUS at 00:38

## 2018-11-09 RX ADMIN — Medication 45 MILLIGRAM(S): at 05:59

## 2018-11-09 RX ADMIN — Medication 81 MILLIGRAM(S): at 11:17

## 2018-11-09 RX ADMIN — SODIUM CHLORIDE 1000 MILLILITER(S): 9 INJECTION, SOLUTION INTRAVENOUS at 05:59

## 2018-11-09 RX ADMIN — PYRIDOSTIGMINE BROMIDE 60 MILLIGRAM(S): 60 SOLUTION ORAL at 11:18

## 2018-11-09 RX ADMIN — BUPROPION HYDROCHLORIDE 300 MILLIGRAM(S): 150 TABLET, EXTENDED RELEASE ORAL at 11:17

## 2018-11-09 RX ADMIN — CHLORHEXIDINE GLUCONATE 1 APPLICATION(S): 213 SOLUTION TOPICAL at 11:18

## 2018-11-09 NOTE — DISCHARGE NOTE ADULT - HOSPITAL COURSE
2 y/o F with PMH POT syndrome and small fiber neuropathy who presents for right sided facial numbness starting at 9PM on 11/7. She noticed numbness from the eye brow to the chin felt numb, no associated pain, no visual changes, no dizziness. She reported that her face appeared to have a right droop. She took her BP and it was normal so she was not concerned and went to sleep. The next morning there was some resolution however it returned. She called Dr. Aguilar (her PCP) who directed her to go to the ED.     In the [facility] ED, patient's vitals were stable; afebrile, HR in the 80's, SBP to 130's and saturating well on room air. Her NIHSS was 1 for sensation discrepancy between right and left side of lower face. No associated droop. CT head negative for signs of acute transcortical stroke or hemorrhage. Patient +mild dizziness, tiredness, ongoing right sided numbness in her lower face. ROS negative for visual changes, ataxia, dysarthria or aphasia, nausea, vomiting. No similar episodes in the past. 4 y/o F with PMH POT syndrome and small fiber neuropathy who presents for right sided facial numbness starting at 9PM on 11/7. She noticed numbness from the eye brow to the chin felt numb, no associated pain, no visual changes, no dizziness. She reported that her face appeared to have a right droop. She took her BP and it was normal so she was not concerned and went to sleep. The next morning there was some resolution however it returned. She called Dr. Aguilar (her PCP) who directed her to go to the ED.     In the [facility] ED, patient's vitals were stable; afebrile, HR in the 80's, SBP to 130's and saturating well on room air. Her NIHSS was 1 for sensation discrepancy between right and left side of lower face. No associated droop. CT head negative for signs of acute transcortical stroke or hemorrhage. Patient +mild dizziness, tiredness, ongoing right sided numbness in her lower face. ROS negative for visual changes, ataxia, dysarthria or aphasia, nausea, vomiting. No similar episodes in the past. MRI ordered. Neurology recommends outpatient follow up. Patient is cleared for discharge.

## 2018-11-09 NOTE — DISCHARGE NOTE ADULT - PATIENT PORTAL LINK FT
You can access the [facility] Patient Portal, offered by [facility], by registering with the following website: [email or web address]

## 2018-11-09 NOTE — DISCHARGE NOTE ADULT - CARE PROVIDER_API CALL
Miller Taylor), Neurology; Vascular Neurology  [address] [address]  Phone: [phone]  Fax: [phone]    Caroline Gonzalez), Neurology  [address] [address]  Phone: [phone]  Fax: [phone]

## 2018-11-09 NOTE — PROGRESS NOTE ADULT - SUBJECTIVE AND OBJECTIVE BOX
OVERNIGHT EVENTS: LAQUITA    INTERVAL HPI: Pt is examined at bedside. She appears well, in NAD. Patient says symptoms of facial droop have since resolved. However, patient still feels numbness in right cheek area and right distal arm/hand. Patient denies any weakness but does mention she felt weak in the right leg one week ago and was not able to raise it. She denies any headaches, vision changes, chest pain, SOB, abdominal pain, nausea/vomiting.     Vital Signs Last 24 Hrs  T(C): 36.5 (09 Nov 2018 08:38), Max: 36.9 (08 Nov 2018 21:44)  T(F): 97.7 (09 Nov 2018 08:38), Max: 98.4 (08 Nov 2018 21:44)  HR: 71 (09 Nov 2018 08:38) (69 - 81)  BP: 119/77 (09 Nov 2018 08:38) (101/66 - 144/87)  BP(mean): --  ABP: --  ABP(mean): --  RR: 16 (09 Nov 2018 08:38) (16 - 18)  SpO2: 99% (09 Nov 2018 08:38) (96% - 99%)    I&O's Summary    08 Nov 2018 07:01  -  09 Nov 2018 07:00  --------------------------------------------------------  IN: 2004 mL / OUT: 0 mL / NET: 2004 mL          LABS:                        11.1   5.9   )-----------( 223      ( 09 Nov 2018 05:52 )             35.5     11-09    140  |  100  |  10  ----------------------------<  93  3.5   |  24  |  0.68    Ca    9.3      09 Nov 2018 05:52  Mg     2.1     11-09    TPro  7.5  /  Alb  4.5  /  TBili  0.2  /  DBili  x   /  AST  16  /  ALT  12  /  AlkPhos  46  11-08    PT/INR - ( 08 Nov 2018 21:09 )   PT: 12.4 sec;   INR: 1.09          PTT - ( 08 Nov 2018 21:09 )  PTT:34.5 sec  Urinalysis Basic - ( 09 Nov 2018 02:13 )    Color: Yellow / Appearance: Clear / SG: <=1.005 / pH: x  Gluc: x / Ketone: NEGATIVE  / Bili: Negative / Urobili: 0.2 E.U./dL   Blood: x / Protein: NEGATIVE mg/dL / Nitrite: NEGATIVE   Leuk Esterase: NEGATIVE / RBC: x / WBC x   Sq Epi: x / Non Sq Epi: x / Bacteria: x      CAPILLARY BLOOD GLUCOSE      POCT Blood Glucose.: 106 mg/dL (08 Nov 2018 20:32)        RADIOLOGY & ADDITIONAL TESTS:    Consultant(s) Notes Reviewed:  [x ] YES  [ ] NO    MEDICATIONS  (STANDING):  aspirin  chewable 81 milliGRAM(s) Oral daily  buPROPion XL . 300 milliGRAM(s) Oral daily  chlorhexidine 2% Cloths 1 Application(s) Topical daily  ivabradine 5 milliGRAM(s) Oral <User Schedule>  lactated ringers Bolus 1000 milliLiter(s) IV Bolus every 12 hours  polyethylene glycol 3350 35 Gram(s) Oral at bedtime  pyridostigmine 60 milliGRAM(s) Oral daily  thyroid 45 milliGRAM(s) Oral <User Schedule>    MEDICATIONS  (PRN):      PHYSICAL EXAM:  GENERAL: well built, well nourished  HEAD:  Atraumatic, Normocephalic  EYES: EOMI, PERRLA, conjunctiva and sclera clear  ENT: No tonsillar erythema, exudates, or enlargement; Moist mucous membranes, Good dentition, No lesions  NECK: Supple, No JVD, Normal thyroid, no enlarged nodes  NERVOUS SYSTEM:  Alert & Oriented X3, Good concentration; Motor Strength 5/5 B/L upper and lower extremities; DTRs 2+ intact and symmetric, sensory intact  CHEST/LUNG: B/L good air entry; No rales, rhonchi, or wheezing  HEART: S1S2 normal, no S3, Regular rate and rhythm; No murmurs, rubs, or gallops  ABDOMEN: Soft, Nontender, Nondistended; Bowel sounds present  EXTREMITIES:  2+ Peripheral Pulses, No clubbing, cyanosis, or edema  LYMPH: No lymphadenopathy noted  SKIN: No rashes or lesions    Care Discussed with Consultants/Other Providers [ x] YES  [ ] NO

## 2018-11-09 NOTE — DISCHARGE NOTE ADULT - CARE PLAN
Principal Discharge DX:	Numbness  Assessment and plan of treatment:	You presented for neurologic symptoms. During your hospital course you had a CT scan to rule out hemorrhagic stroke. You were treated for POTS disease with fluids. Neurology saw you and decided to have further work up as out patient with MRI of head. Follow up with neurology for MRI and further work up.  Secondary Diagnosis:	POTS (postural orthostatic tachycardia syndrome)  Secondary Diagnosis:	Small fiber neuropathy  Assessment and plan of treatment:	Follow up with neurology Dr. Cuevas Principal Discharge DX:	Numbness  Goal:	Follow up MRI and Neurology  Assessment and plan of treatment:	You presented for neurologic symptoms. During your hospital course you had a CT scan to rule out hemorrhagic stroke. You were treated for POTS disease with fluids. Neurology saw you and decided to have further work up as out patient with MRI of head. Follow up with neurology for MRI and further work up.  Secondary Diagnosis:	POTS (postural orthostatic tachycardia syndrome)  Secondary Diagnosis:	Small fiber neuropathy  Assessment and plan of treatment:	Follow up with neurology Dr. Cuevas

## 2018-11-09 NOTE — DISCHARGE NOTE ADULT - MEDICATION SUMMARY - MEDICATIONS TO TAKE
I will START or STAY ON the medications listed below when I get home from the hospital:    aspirin 81 mg oral tablet, chewable  -- 1 tab(s) by mouth once a day  -- Indication: For POTS (postural orthostatic tachycardia syndrome)    pyridostigmine 60 mg oral tablet  -- 1 tab(s) by mouth once a day  -- Indication: For POTS (postural orthostatic tachycardia syndrome)    ivabradine 5 mg oral tablet  -- 1 tab(s) by mouth   -- Indication: For POTS (postural orthostatic tachycardia syndrome)    buPROPion 300 mg/24 hours (XL) oral tablet, extended release  -- 1 tab(s) by mouth once a day  -- Indication: For POTS (postural orthostatic tachycardia syndrome)    thyroid desiccated 60 mg oral tablet  --  by mouth   -- Indication: For Hypothyroid

## 2018-11-09 NOTE — DISCHARGE NOTE ADULT - PLAN OF CARE
You presented for neurologic symptoms. During your hospital course you had a CT scan to rule out hemorrhagic stroke. You were treated for POTS disease with fluids. Neurology saw you and decided to have further work up as out patient with MRI of head. Follow up with neurology for MRI and further work up. Follow up with neurology Dr. Cuevas Follow up MRI and Neurology

## 2018-11-09 NOTE — PROGRESS NOTE ADULT - ASSESSMENT
42 y/o F with PMH POT syndrome and small fiber neuropathy who presents with one day of persistent right sided facial numbness - NIHSS 1 and CT head negative for stroke - will admit for neurological work up.

## 2018-11-12 ENCOUNTER — INBOUND DOCUMENT (OUTPATIENT)
Age: 43
End: 2018-11-12

## 2018-11-12 PROBLEM — R00.0 TACHYCARDIA, UNSPECIFIED: Chronic | Status: ACTIVE | Noted: 2018-11-09

## 2018-11-12 PROBLEM — G62.9 POLYNEUROPATHY, UNSPECIFIED: Chronic | Status: ACTIVE | Noted: 2018-11-09

## 2018-11-13 ENCOUNTER — RX RENEWAL (OUTPATIENT)
Age: 43
End: 2018-11-13

## 2018-11-13 ENCOUNTER — FORM ENCOUNTER (OUTPATIENT)
Age: 43
End: 2018-11-13

## 2018-11-14 ENCOUNTER — APPOINTMENT (OUTPATIENT)
Dept: MRI IMAGING | Facility: HOSPITAL | Age: 43
End: 2018-11-14

## 2018-11-14 ENCOUNTER — OUTPATIENT (OUTPATIENT)
Dept: OUTPATIENT SERVICES | Facility: HOSPITAL | Age: 43
LOS: 1 days | End: 2018-11-14
Payer: COMMERCIAL

## 2018-11-14 PROCEDURE — A9585: CPT

## 2018-11-14 PROCEDURE — 70553 MRI BRAIN STEM W/O & W/DYE: CPT | Mod: 26

## 2018-11-14 PROCEDURE — 70553 MRI BRAIN STEM W/O & W/DYE: CPT

## 2018-11-15 DIAGNOSIS — G62.89 OTHER SPECIFIED POLYNEUROPATHIES: ICD-10-CM

## 2018-11-15 DIAGNOSIS — R20.0 ANESTHESIA OF SKIN: ICD-10-CM

## 2018-11-15 DIAGNOSIS — E03.9 HYPOTHYROIDISM, UNSPECIFIED: ICD-10-CM

## 2018-11-15 DIAGNOSIS — I49.8 OTHER SPECIFIED CARDIAC ARRHYTHMIAS: ICD-10-CM

## 2018-11-15 DIAGNOSIS — F32.9 MAJOR DEPRESSIVE DISORDER, SINGLE EPISODE, UNSPECIFIED: ICD-10-CM

## 2018-11-21 ENCOUNTER — RX RENEWAL (OUTPATIENT)
Age: 43
End: 2018-11-21

## 2018-11-28 ENCOUNTER — FORM ENCOUNTER (OUTPATIENT)
Age: 43
End: 2018-11-28

## 2018-11-29 ENCOUNTER — APPOINTMENT (OUTPATIENT)
Dept: MRI IMAGING | Facility: HOSPITAL | Age: 43
End: 2018-11-29
Payer: MEDICARE

## 2018-11-29 ENCOUNTER — OUTPATIENT (OUTPATIENT)
Dept: OUTPATIENT SERVICES | Facility: HOSPITAL | Age: 43
LOS: 1 days | End: 2018-11-29
Payer: COMMERCIAL

## 2018-11-29 PROCEDURE — 70544 MR ANGIOGRAPHY HEAD W/O DYE: CPT

## 2018-11-29 PROCEDURE — 70544 MR ANGIOGRAPHY HEAD W/O DYE: CPT | Mod: 26

## 2018-12-04 ENCOUNTER — LABORATORY RESULT (OUTPATIENT)
Age: 43
End: 2018-12-04

## 2018-12-04 ENCOUNTER — APPOINTMENT (OUTPATIENT)
Dept: NEUROLOGY | Facility: CLINIC | Age: 43
End: 2018-12-04
Payer: MEDICAID

## 2018-12-04 VITALS
DIASTOLIC BLOOD PRESSURE: 86 MMHG | BODY MASS INDEX: 23.79 KG/M2 | HEIGHT: 59 IN | OXYGEN SATURATION: 100 % | TEMPERATURE: 97.9 F | HEART RATE: 69 BPM | WEIGHT: 118 LBS | SYSTOLIC BLOOD PRESSURE: 139 MMHG

## 2018-12-04 PROCEDURE — 99214 OFFICE O/P EST MOD 30 MIN: CPT

## 2018-12-04 RX ORDER — ERGOCALCIFEROL (VITAMIN D2) 1250 MCG
50000 CAPSULE ORAL
Refills: 0 | Status: DISCONTINUED | COMMUNITY
End: 2018-12-04

## 2018-12-04 RX ORDER — PYRIDOSTIGMINE BROMIDE 60 MG/1
60 TABLET ORAL
Qty: 3 | Refills: 0 | Status: ACTIVE | COMMUNITY

## 2018-12-04 RX ORDER — PROPRANOLOL HCL 10 MG
10 TABLET ORAL 3 TIMES DAILY
Refills: 0 | Status: DISCONTINUED | COMMUNITY
End: 2018-12-04

## 2018-12-04 RX ORDER — MIDODRINE HYDROCHLORIDE 2.5 MG/1
2.5 TABLET ORAL 3 TIMES DAILY
Refills: 0 | Status: DISCONTINUED | COMMUNITY
End: 2018-12-04

## 2018-12-04 RX ORDER — GABAPENTIN 100 MG/1
100 CAPSULE ORAL
Refills: 0 | Status: DISCONTINUED | COMMUNITY
End: 2018-12-04

## 2018-12-05 LAB
25(OH)D3 SERPL-MCNC: 19.6 NG/ML
C3 SERPL-MCNC: 127 MG/DL
C4 SERPL-MCNC: 24 MG/DL
THYROGLOB AB SERPL-ACNC: 26.5 IU/ML
THYROPEROXIDASE AB SERPL IA-ACNC: 148 IU/ML

## 2018-12-06 ENCOUNTER — APPOINTMENT (OUTPATIENT)
Dept: NEUROLOGY | Facility: CLINIC | Age: 43
End: 2018-12-06

## 2018-12-06 LAB
ALBUMIN MFR SERPL ELPH: 55.9 %
ALBUMIN SERPL-MCNC: 4.3 G/DL
ALBUMIN/GLOB SERPL: 1.3 RATIO
ALPHA1 GLOB MFR SERPL ELPH: 4.3 %
ALPHA1 GLOB SERPL ELPH-MCNC: 0.3 G/DL
ALPHA2 GLOB MFR SERPL ELPH: 9.7 %
ALPHA2 GLOB SERPL ELPH-MCNC: 0.7 G/DL
B-GLOBULIN MFR SERPL ELPH: 11.4 %
B-GLOBULIN SERPL ELPH-MCNC: 0.9 G/DL
B2 GLYCOPROT1 IGG SER-ACNC: <5 SGU
B2 GLYCOPROT1 IGM SER-ACNC: <5 SMU
CH50 SERPL-MCNC: 72 U/ML
GAMMA GLOB FLD ELPH-MCNC: 1.4 G/DL
GAMMA GLOB MFR SERPL ELPH: 18.7 %
INTERPRETATION SERPL IEP-IMP: NORMAL
M PROTEIN SPEC IFE-MCNC: NORMAL
PROT SERPL-MCNC: 7.7 G/DL
PROT SERPL-MCNC: 7.7 G/DL

## 2018-12-07 LAB — B2 GLYCOPROT1 IGA SERPL IA-ACNC: <5 SAU

## 2018-12-11 LAB
CARN ESTERS SERPL-MCNC: 13 UMOL/L
CARNITINE FREE SERPL-SCNC: 23.8 UMOL/L
CARNITINE FREE SFR SERPL: 0.6 UMOL/L
CARNITINE SERPL-SCNC: 36.8 UMOL/L

## 2018-12-13 LAB
MISCELLANEOUS TEST: NORMAL
PROC NAME: NORMAL

## 2019-03-05 ENCOUNTER — APPOINTMENT (OUTPATIENT)
Dept: NEUROLOGY | Facility: CLINIC | Age: 44
End: 2019-03-05
Payer: MEDICAID

## 2019-03-05 PROCEDURE — 99214 OFFICE O/P EST MOD 30 MIN: CPT

## 2019-03-07 DIAGNOSIS — R56.9 UNSPECIFIED CONVULSIONS: ICD-10-CM

## 2019-03-12 NOTE — HISTORY OF PRESENT ILLNESS
[FreeTextEntry1] : \par Ms. Matias is a 43 year old female who presents today for a follow up visit for small fiber neuropathy diagnosed with skin biopsy, Hashimoto’s with elevated TPO Ab of 117 (N=<35), POTS and elevated MANDEEP of 1:320 (N=<1:80) with cognitive and systemic manifestations including pain, tremor, muscle fatigue, attention and concentration.  \par \par \par She has persistent symptoms of cognitive impairment to include attention, concentration and forgetfulness as well as intermittent tremor, stiffness to bilateral lower extremities (R>L), fatigue, generalized weakness and muscle fatigue.\par \par While at [facility] in September 2018, she was treated with IVIG 35gm/day x 3 (approximate weight= 53kg) from 9/6/2018-9/8/2018. She reports side effects after the second dose to include fever, headache and neck pain. \par \par At last visit, plan was to initiate treatment with IVIG 0.4gm/kg/week. State level appeal remains pending at this time.\par \par 12/2018 serum for nerve antibodies was completed and unremarkable to include anti-ganglioside Ab, gd1b ab, anti-sulphatide antibodies and Mag antibodies. Serum also unremarkable for beta2 glycoprotein, C3, C4, CH50, immunofixation and carnitine.\par \par TPO remains elevated at 148 and low vitamin D of 19. She continues treatment with [place] thyroid 15mg TID. Patient advised to follow with PCP for vitamin D supplementation. \par \par She continues daily treatment with LR 2L via PICC daily for POTS.\par \par Ms. Matias continues to feel symptoms as stated. At this time he is also reporting difficulty walking due to intermittent bilateral lower extremity stiffness (R>L).  She has bilateral lower extremity pain at night. She reports intermittent numbness to the right side of her face.  Cognitive symptoms continue to be intermittent. \par \par Of note, her menses has stopped since last visit. \par \par Prior work up includes: \par •	Normal REEG and AEEG 6/2018\par •	Neuro psych testing 7/2018 no neurologically based cognitive disorder.\par •	Brain MRI 11/2018: Normal brain with Small vascular flow-void contacting the superior left cisternal segment of the cranial nerve V at the root entry zone. Normal appearance to the bilateral nerve VII and VIII complexes\par •	MRA brain 11/29/2018 was normal.\par •	EMG 9/7/2018 ([facility]): Normal. \par •	Skin biopsy 9/12/2018 ([facility]): small fiber axonopathy. \par •	Autonomic testing 9/13/2018 ([facility]): dysfunction of the sympathetic sudomotor fibers and orthostatic intolerance.\par

## 2019-03-12 NOTE — DISCUSSION/SUMMARY
[FreeTextEntry1] :  43 year old female with small fiber neuropathy diagnosed via skin biopsy with history of Hashimoto’s  POTS and elevated MANDEEP of 1:320 (N=<1:80) with cognitive and systemic manifestations including pain, tremor, muscle fatigue and difficulty with attention and concentration.\par \par Recommend treatment with IVIG. Will follow up on insurance approval. If denied, will initiate treatment with steroids.\par \par She reports bilateral lower extremity pain at night. Will start lyrica 50mg PO HS to increase to 100mg HS after 2 weeks\par \par \par Patient advised to follow with GYN for lack of menses.\par \par Follow with PCP regarding low vitamin D level.\par \par Plan:\par \par 1. Follow up on appeal of IVIG, if approved start:  IVIG 0.4 gm/kg/week. Each infusion to run over at least 7 hours. Premedicate withe tylenol and Benadryl. No need for additional IVF as patient received LR 2L daily for POTS. If adverse reaction occurs, will pre-medicate with small dose of steroids prior to infusions.\par \par 2. If IVIG is denied will initiate treatment with steroids\par \par 3. Start lyrica for bilateral lower extremity pain: 50mg PO HS. After 2 weeks increase to 100mg PO HS.\par \par 4. Follow with Gyn for lack of menses \par \par 5. PCP for vitamin D supplementation\par \par 6. RTC 3 months

## 2019-03-12 NOTE — PHYSICAL EXAM
[FreeTextEntry1] : Constitutional: alert, in no acute distress, well nourished and well developed. \par Psychiatric: oriented to person, place, and time, insight and judgment were intact, the affect was normal and the mood was normal. \par Neurologic: \par Orientation: oriented to person, oriented to place and oriented to time. \par Memory: short term memory impaired, but remote memory intact and recent registration memory intact. \par Cranial Nerves: visual acuity intact bilaterally, visual fields full to confrontation, pupils equal round and reactive to light, extraocular motion intact, facial sensation intact symmetrically, face symmetrical, hearing was intact bilaterally, tongue and palate midline, head turning and shoulder shrug symmetric and there was no tongue deviation with protrusion. \par Motor: muscle tone was normal in all four extremities, muscle strength was normal in all four extremities, no involuntary movements were seen and normal bulk in all four extremities. \par Motor Strength:. no pronator drift on the right. no pronator drift on the left. strength was normal in both upper extremities. strength was normal in both lower extremities. \par Sensory exam: light touch was intact, pain and temperature was intact and vibration was intact . a positive Romberg's sign was present. \par Coordination:. normal gait. STRONGLY POSITIVE ROMBERG'S SIGN. \par Deep tendon reflexes: \par Biceps right 2+. Biceps left 2+.  \par Triceps right 2+. Triceps left 2+.  \par Brachioradialis right 2+. Brachioradialis left 2+.  \par Patella right 2+. Patella left 2+.  \par Plantar responses normal on the right, normal on the left. \par Ankle Clonus absent on the right, absent on the left.  \par Eyes: the sclera and conjunctiva were normal, pupils were equal in size, round, reactive to light, with normal accommodation, extraocular movements were intact and full visual field. \par Musculoskeletal: normal gait and muscle strength and tone were normal. \par

## 2019-03-14 ENCOUNTER — OTHER (OUTPATIENT)
Age: 44
End: 2019-03-14

## 2019-05-09 ENCOUNTER — APPOINTMENT (OUTPATIENT)
Dept: NEPHROLOGY | Facility: CLINIC | Age: 44
End: 2019-05-09
Payer: SELF-PAY

## 2019-05-09 VITALS — HEART RATE: 84 BPM | DIASTOLIC BLOOD PRESSURE: 74 MMHG | SYSTOLIC BLOOD PRESSURE: 120 MMHG

## 2019-05-09 VITALS — SYSTOLIC BLOOD PRESSURE: 124 MMHG | DIASTOLIC BLOOD PRESSURE: 80 MMHG | HEART RATE: 84 BPM

## 2019-05-09 VITALS — SYSTOLIC BLOOD PRESSURE: 128 MMHG | HEART RATE: 84 BPM | DIASTOLIC BLOOD PRESSURE: 76 MMHG

## 2019-05-09 DIAGNOSIS — F32.9 MAJOR DEPRESSIVE DISORDER, SINGLE EPISODE, UNSPECIFIED: ICD-10-CM

## 2019-05-09 DIAGNOSIS — E06.3 AUTOIMMUNE THYROIDITIS: ICD-10-CM

## 2019-05-09 DIAGNOSIS — N92.3 OVULATION BLEEDING: ICD-10-CM

## 2019-05-09 DIAGNOSIS — R00.0 TACHYCARDIA, UNSPECIFIED: ICD-10-CM

## 2019-05-09 DIAGNOSIS — G62.9 POLYNEUROPATHY, UNSPECIFIED: ICD-10-CM

## 2019-05-09 DIAGNOSIS — R09.89 OTHER SPECIFIED SYMPTOMS AND SIGNS INVOLVING THE CIRCULATORY AND RESPIRATORY SYSTEMS: ICD-10-CM

## 2019-05-09 DIAGNOSIS — I95.1 TACHYCARDIA, UNSPECIFIED: ICD-10-CM

## 2019-05-09 DIAGNOSIS — R20.0 ANESTHESIA OF SKIN: ICD-10-CM

## 2019-05-09 PROCEDURE — 99215 OFFICE O/P EST HI 40 MIN: CPT

## 2019-05-09 NOTE — END OF VISIT
[>50% of Time Spent on Counseling and Coordination of Care for  ___] : Greater than 50% of the encounter time was spent on counseling and coordination of care for [unfilled] [Time Spent: ___ minutes] : I have spent [unfilled] minutes of face to face time with the patient [FreeTextEntry3] : All medical record entries made by the Scribe were at my, Dr. Cirilo Aguilar, direction and personally dictated by me on 05/09/2019. I have reviewed the chart and agree that the record accurately reflects my personal performance of the history, physical exam, assessment and plan. I have also personally directed, reviewed, and agreed with the chart.

## 2019-05-09 NOTE — ADDENDUM
[FreeTextEntry1] : I, Naif Easley, acted solely as a scribe for Dr. Cirilo Aguilar on this date 05/09/2019.

## 2019-05-09 NOTE — PHYSICAL EXAM
[General Appearance - In No Acute Distress] : in no acute distress [General Appearance - Alert] : alert [PERRL With Normal Accommodation] : pupils were equal in size, round, and reactive to light [Extraocular Movements] : extraocular movements were intact [Sclera] : the sclera and conjunctiva were normal [Oropharynx] : the oropharynx was normal [Neck Appearance] : the appearance of the neck was normal [Outer Ear] : the ears and nose were normal in appearance [Thyroid Diffuse Enlargement] : the thyroid was not enlarged [Jugular Venous Distention Increased] : there was no jugular-venous distention [Neck Cervical Mass (___cm)] : no neck mass was observed [Thyroid Nodule] : there were no palpable thyroid nodules [Auscultation Breath Sounds / Voice Sounds] : lungs were clear to auscultation bilaterally [Heart Sounds] : normal S1 and S2 [Heart Rate And Rhythm] : heart rate was normal and rhythm regular [Heart Sounds Gallop] : no gallops [Murmurs] : no murmurs [Heart Sounds Pericardial Friction Rub] : no pericardial rub [Bowel Sounds] : normal bowel sounds [Abdomen Soft] : soft [Edema] : there was no peripheral edema [Abdomen Tenderness] : non-tender [Abdomen Mass (___ Cm)] : no abdominal mass palpated [No Spinal Tenderness] : no spinal tenderness [No CVA Tenderness] : no ~M costovertebral angle tenderness [Musculoskeletal - Swelling] : no joint swelling seen [Nail Clubbing] : no clubbing  or cyanosis of the fingernails [Abnormal Walk] : normal gait [Skin Turgor] : normal skin turgor [Skin Color & Pigmentation] : normal skin color and pigmentation [Motor Tone] : muscle strength and tone were normal [] : no rash [No Focal Deficits] : no focal deficits [Oriented To Time, Place, And Person] : oriented to person, place, and time [Affect] : the affect was normal [Impaired Insight] : insight and judgment were intact

## 2019-05-09 NOTE — ASSESSMENT
[FreeTextEntry1] : Plan:\par 1) Labile blood pressure: BP acceptable today, no changes to regimen, will reassess pressure and medications at next visit due to risk for progression. \par 2) POTS: heart rate acceptable and maintained in all three positions without postural dependence, continue follow-up with specialist at [facility].\par 3) Hashimoto's thyroiditis: will recheck thyroid panel with labs today, continue management with thyroid desiccated 60mg for now.\par 4) Progressive neurologic decline: neuromuscular complaints with patient's concern about cognition detailed above; discussed patient with Dr. Caroline Gonzalez and will refer patient for consultation where the question of admission for comprehensive workup of all pertinent issues including upper and lower endoscopy along with gynecology evaluation for iron deficiency, complete neuroendocrinology evaluation and ACTH stimulation studies, neurologic evaluation and imaging to consider multiple sclerosis as proposed diagnosis, neuropathy evaluation, infectious disease consultation to rule out any bacterial infection, and clotting and rheumatology workups was discussed.\par \par Changes to Medications: none.\par \par Labs were not drawn today and the results of those drawn recently were reviewed with patient. Her return here will depend on course of treatment determined in collaboration with Dr. Gonzalez.\par \par Discussion and evaluation of the patient's treatment plan led to a visit time of over 40 minutes.

## 2019-05-09 NOTE — REASON FOR VISIT
[Follow-Up] : a follow-up visit [FreeTextEntry1] : with complains of continued progressive neurologic decline.

## 2019-05-09 NOTE — HISTORY OF PRESENT ILLNESS
[FreeTextEntry1] : The patient is a 44 year old female presenting for follow-up evaluation and active reassessment of labile blood pressure, Hashimoto's thyroiditis, neuropathy, depression, Graves disease and POTS. \par \par Interval Data:\par - Evaluation with Dr. Souhel Najjar on 8/7/18: referred for EMG due to impaired balance. \par - EMG at [facility] on 9/7/18: normal but small fiber neuropathy could not be excluded.\par - Skin biopsy at [facility] on 9/12/18: significant for small fiber axonopathy.\par - Autonomic testing at [facility] on 9/13/18: significant for dysfunction of sympathetic sudomotor fibers and orthostatic intolerance.\par - Hospitalization at [facility] on 11/8/18: presented with previous symptoms of right facial droop and numbness, CT of head negative for signs of transcortical stroke or hemorrhage, MRI ordered and patient instructed for outpatient neurology follow-up, discharged 11/9/18.\par - Evaluation with Dr. Najjar on 12/4/18: patient previously treated with Iv Ig from 9/6-9/8/18, planned to restart Iv Ig therapy (0.4gm/kg/week over 7hrs) pending insurance approval and premedicate with Tylenol and Benadryl.\par - Evaluation with Dr. Najjar on 3/5/19: had not yet received insurance authorization for Iv Ig as planned, recommended steroid treatment if approval denied. Insurance later denied Iv Ig and patient was instructed to start Solumedrol 1gm QD for three days to be followed by 1gm QW for 4 weeks but patient preferred not to proceed with steroids due to fear of side effects.\par - Labs on 4/17/19: Hgb 10.4, Hct 32.5, and entire CMP within normal limits.\par \par Current Complaints:\par - persistent episodes of poor balance which can completely impair mobility, severe leg weakness with loss of sensation and "inability to control muscles" which can result in falling, physician who follows patient for POTS has recommended spinal imaging and spinal tap to rule out multiple sclerosis.\par - frequent syncope without loss of conscious, while on floor patient may have constitutional tremors and paralysis which can last hours, previously diagnosed small fiber axonopathy at [facility].\par - cognitive impairment with worsening memory, poor concentration, and aphasia.\par - dysuria with reported sensations of incomplete voiding, otherwise denies urinary dysfunction.\par - sought treatment in [place] where she was reportedly referred for a repeat nephrology evaluation due to fluid imbalance although most recent CMP normal, is currently taking 2L of Ringer's IVF daily. \par - denies awareness of source of iron deficiency, reports lack of menstruation and most recent gynecology evaluation in winter of 2018, previous colonoscopy (patient reports perhaps incomplete due to prep) and endoscopy at [facility] reportedly unrevealing. 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 \par - labs done in [place] raise question as to hypercoagulable state and require further clarification.\par \par Current Medications: ASA 81mg QD, pyridostigmine 60mg QD, Ivbradine 5mg QD, Wellbutrin 300mg QD, thyroid desiccated 60mg QD, Lyrica 100mg QHS (added since last visit, per Dr. Najjar).

## 2019-05-29 ENCOUNTER — APPOINTMENT (OUTPATIENT)
Dept: NEUROLOGY | Facility: CLINIC | Age: 44
End: 2019-05-29
Payer: MEDICAID

## 2019-05-29 VITALS
SYSTOLIC BLOOD PRESSURE: 117 MMHG | OXYGEN SATURATION: 100 % | HEIGHT: 59 IN | WEIGHT: 118 LBS | HEART RATE: 71 BPM | BODY MASS INDEX: 23.79 KG/M2 | DIASTOLIC BLOOD PRESSURE: 79 MMHG

## 2019-05-29 DIAGNOSIS — R53.83 OTHER FATIGUE: ICD-10-CM

## 2019-05-29 DIAGNOSIS — G62.9 POLYNEUROPATHY, UNSPECIFIED: ICD-10-CM

## 2019-05-29 DIAGNOSIS — R41.9 UNSPECIFIED SYMPTOMS AND SIGNS INVOLVING COGNITIVE FUNCTIONS AND AWARENESS: ICD-10-CM

## 2019-05-29 PROCEDURE — 99215 OFFICE O/P EST HI 40 MIN: CPT

## 2019-05-29 PROCEDURE — 99354: CPT

## 2019-05-29 RX ORDER — PREGABALIN 50 MG/1
50 CAPSULE ORAL
Qty: 42 | Refills: 0 | Status: DISCONTINUED | COMMUNITY
Start: 2019-03-05 | End: 2019-05-29

## 2019-05-29 RX ORDER — OMEPRAZOLE 20 MG/1
20 TABLET, DELAYED RELEASE ORAL
Qty: 35 | Refills: 0 | Status: DISCONTINUED | COMMUNITY
Start: 2019-04-10 | End: 2019-05-29

## 2019-05-29 RX ORDER — OMEGA-3/DHA/EPA/FISH OIL 300-1000MG
CAPSULE ORAL
Refills: 0 | Status: ACTIVE | COMMUNITY

## 2019-05-29 NOTE — HISTORY OF PRESENT ILLNESS
[FreeTextEntry1] : 43 yo W here for evaluation of multiple symptoms and small fiber neuropathy\par She's had graves disease for over 20 years, developed hashimoto's about 12 years ago\par About 5 years ago developed tremor, motor incoordination, fatigue, cognitive issues. \par Then about 2 years ago developed lightheadedness, feels head is heavy, she can't talk, feels cold, feels "paralyzed", has trembling of limbs. She was diagnosed with POTS based on tilt table test. \par She also has pain in her legs, described as tingling, also feels similar symptoms in her head. \par Her nose runs intermittently mostly clear, she also has severe constipation. She recently developed problems with balance, feels she is falling, has to hold on. She has had a few episodes of being unable to move her legs at all, sometimes the right one, sometimes both, lasting a few minutes at a time. Her senses of smell and hearing have gotten worse. \par She had a course of IVIG which did not help, and she had side effects from the infusion. \par She has tried midodrine and florinef which did not work and made her feel sick. \par For the past two years she's been on constant IV fluids. \par She' takes neurontin 300mg at night for difficulty sleeping, restless legs syndrome which helps somewhat. \par \par Her 12 year old daughter has dysautonomia, also developmentally delayed and growth restriction, recurrent pneumonia; no clear diagnosis. \par \par Labs were all largely unremarkable for autoimmune, paraneoplastic causes of symptoms\par \par EEG (routine and ambulatory) was negative\par Neuropsych testing was \par EMG at [facility] was unremarkable \par Skin biopsy was positive for small fiber neuropathy\par MRI brain showed vascular loop contacting left CNV, otherwise normal

## 2019-05-29 NOTE — ASSESSMENT
[FreeTextEntry1] : Many of her symptoms can be attributed to small fiber neuropathy, however not all of them. She has no weakness on exam, and there is no clear cause for her feeling of weakness and imbalance. \par \par We discussed various options for treatment including another trial of midodrine and/or florinef, however she does not wish to do so at this time. \par \par I counseled her on the risks of continuous IV fluids, however she believes this is the only thing that has helped her and does not wish to do so right now. \par \par There is no particular further workup that i would recommend at this time. Given her daughter's history, there may be a genetic component to her syndrome, however she's had her blood drawn for Trio exome testing and nothing was found, effectively ruling out a common genetic disorder shared between her and her daughter.

## 2019-05-29 NOTE — CONSULT LETTER
[Dear  ___] : Dear  [unfilled], [Consult Letter:] : I had the pleasure of evaluating your patient, [unfilled]. [Please see my note below.] : Please see my note below. [Consult Closing:] : Thank you very much for allowing me to participate in the care of this patient.  If you have any questions, please do not hesitate to contact me. [Sincerely,] : Sincerely, [FreeTextEntry3] : Abhinav Berumen M.D.\par Neurology, Electromyography and Neuromuscular Medicine\par [facility]\par \par  of Neurology\par \[facility] [place] [facility]\"" / [employer]

## 2019-06-28 ENCOUNTER — TRANSCRIPTION ENCOUNTER (OUTPATIENT)
Age: 44
End: 2019-06-28

## 2019-06-28 ENCOUNTER — INPATIENT (INPATIENT)
Facility: HOSPITAL | Age: 44
LOS: 3 days | Discharge: HOME | DRG: 74 | End: 2019-07-02
Attending: PSYCHIATRY & NEUROLOGY | Admitting: PSYCHIATRY & NEUROLOGY
Payer: COMMERCIAL

## 2019-06-28 VITALS
WEIGHT: 113.76 LBS | HEIGHT: 59 IN | TEMPERATURE: 98 F | RESPIRATION RATE: 18 BRPM | DIASTOLIC BLOOD PRESSURE: 78 MMHG | SYSTOLIC BLOOD PRESSURE: 125 MMHG | HEART RATE: 70 BPM | OXYGEN SATURATION: 96 %

## 2019-06-28 DIAGNOSIS — E06.3 AUTOIMMUNE THYROIDITIS: ICD-10-CM

## 2019-06-28 DIAGNOSIS — R55 SYNCOPE AND COLLAPSE: ICD-10-CM

## 2019-06-28 DIAGNOSIS — Z91.89 OTHER SPECIFIED PERSONAL RISK FACTORS, NOT ELSEWHERE CLASSIFIED: ICD-10-CM

## 2019-06-28 DIAGNOSIS — R63.8 OTHER SYMPTOMS AND SIGNS CONCERNING FOOD AND FLUID INTAKE: ICD-10-CM

## 2019-06-28 DIAGNOSIS — R00.0 TACHYCARDIA, UNSPECIFIED: ICD-10-CM

## 2019-06-28 DIAGNOSIS — G62.9 POLYNEUROPATHY, UNSPECIFIED: ICD-10-CM

## 2019-06-28 LAB
ALBUMIN SERPL ELPH-MCNC: 3.7 G/DL — SIGNIFICANT CHANGE UP (ref 3.3–5)
ALP SERPL-CCNC: 44 U/L — SIGNIFICANT CHANGE UP (ref 40–120)
ALT FLD-CCNC: 19 U/L — SIGNIFICANT CHANGE UP (ref 10–45)
ANION GAP SERPL CALC-SCNC: 11 MMOL/L — SIGNIFICANT CHANGE UP (ref 5–17)
APTT BLD: 32.4 SEC — SIGNIFICANT CHANGE UP (ref 27.5–36.3)
AST SERPL-CCNC: 24 U/L — SIGNIFICANT CHANGE UP (ref 10–40)
BASOPHILS # BLD AUTO: 0.03 K/UL — SIGNIFICANT CHANGE UP (ref 0–0.2)
BASOPHILS NFR BLD AUTO: 0.5 % — SIGNIFICANT CHANGE UP (ref 0–2)
BILIRUB SERPL-MCNC: 0.4 MG/DL — SIGNIFICANT CHANGE UP (ref 0.2–1.2)
BUN SERPL-MCNC: 12 MG/DL — SIGNIFICANT CHANGE UP (ref 7–23)
CALCIUM SERPL-MCNC: 8.9 MG/DL — SIGNIFICANT CHANGE UP (ref 8.4–10.5)
CHLORIDE SERPL-SCNC: 106 MMOL/L — SIGNIFICANT CHANGE UP (ref 96–108)
CO2 SERPL-SCNC: 21 MMOL/L — LOW (ref 22–31)
CREAT SERPL-MCNC: 0.73 MG/DL — SIGNIFICANT CHANGE UP (ref 0.5–1.3)
EOSINOPHIL # BLD AUTO: 0.12 K/UL — SIGNIFICANT CHANGE UP (ref 0–0.5)
EOSINOPHIL NFR BLD AUTO: 2.2 % — SIGNIFICANT CHANGE UP (ref 0–6)
GLUCOSE SERPL-MCNC: 82 MG/DL — SIGNIFICANT CHANGE UP (ref 70–99)
HCT VFR BLD CALC: 34.5 % — SIGNIFICANT CHANGE UP (ref 34.5–45)
HGB BLD-MCNC: 10.4 G/DL — LOW (ref 11.5–15.5)
IMM GRANULOCYTES NFR BLD AUTO: 0.2 % — SIGNIFICANT CHANGE UP (ref 0–1.5)
INR BLD: 1.18 — HIGH (ref 0.88–1.16)
LYMPHOCYTES # BLD AUTO: 2.05 K/UL — SIGNIFICANT CHANGE UP (ref 1–3.3)
LYMPHOCYTES # BLD AUTO: 37.1 % — SIGNIFICANT CHANGE UP (ref 13–44)
MAGNESIUM SERPL-MCNC: 1.9 MG/DL — SIGNIFICANT CHANGE UP (ref 1.6–2.6)
MCHC RBC-ENTMCNC: 24.2 PG — LOW (ref 27–34)
MCHC RBC-ENTMCNC: 30.1 GM/DL — LOW (ref 32–36)
MCV RBC AUTO: 80.4 FL — SIGNIFICANT CHANGE UP (ref 80–100)
MONOCYTES # BLD AUTO: 0.56 K/UL — SIGNIFICANT CHANGE UP (ref 0–0.9)
MONOCYTES NFR BLD AUTO: 10.1 % — SIGNIFICANT CHANGE UP (ref 2–14)
NEUTROPHILS # BLD AUTO: 2.76 K/UL — SIGNIFICANT CHANGE UP (ref 1.8–7.4)
NEUTROPHILS NFR BLD AUTO: 49.9 % — SIGNIFICANT CHANGE UP (ref 43–77)
NRBC # BLD: 0 /100 WBCS — SIGNIFICANT CHANGE UP (ref 0–0)
PHOSPHATE SERPL-MCNC: 3.6 MG/DL — SIGNIFICANT CHANGE UP (ref 2.5–4.5)
PLATELET # BLD AUTO: 208 K/UL — SIGNIFICANT CHANGE UP (ref 150–400)
POTASSIUM SERPL-MCNC: 4 MMOL/L — SIGNIFICANT CHANGE UP (ref 3.5–5.3)
POTASSIUM SERPL-SCNC: 4 MMOL/L — SIGNIFICANT CHANGE UP (ref 3.5–5.3)
PROT SERPL-MCNC: 6.8 G/DL — SIGNIFICANT CHANGE UP (ref 6–8.3)
PROTHROM AB SERPL-ACNC: 13.4 SEC — HIGH (ref 10–12.9)
RBC # BLD: 4.29 M/UL — SIGNIFICANT CHANGE UP (ref 3.8–5.2)
RBC # FLD: 15.4 % — HIGH (ref 10.3–14.5)
SODIUM SERPL-SCNC: 138 MMOL/L — SIGNIFICANT CHANGE UP (ref 135–145)
TSH SERPL-MCNC: 0.55 UIU/ML — SIGNIFICANT CHANGE UP (ref 0.35–4.94)
WBC # BLD: 5.53 K/UL — SIGNIFICANT CHANGE UP (ref 3.8–10.5)
WBC # FLD AUTO: 5.53 K/UL — SIGNIFICANT CHANGE UP (ref 3.8–10.5)

## 2019-06-28 PROCEDURE — 71045 X-RAY EXAM CHEST 1 VIEW: CPT | Mod: 26

## 2019-06-28 PROCEDURE — 99223 1ST HOSP IP/OBS HIGH 75: CPT

## 2019-06-28 RX ORDER — THYROID 120 MG
15 TABLET ORAL
Refills: 0 | Status: DISCONTINUED | OUTPATIENT
Start: 2019-06-28 | End: 2019-07-02

## 2019-06-28 RX ORDER — PYRIDOSTIGMINE BROMIDE 60 MG/5ML
60 SOLUTION ORAL DAILY
Refills: 0 | Status: DISCONTINUED | OUTPATIENT
Start: 2019-06-28 | End: 2019-07-01

## 2019-06-28 RX ORDER — ACETAMINOPHEN 500 MG
650 TABLET ORAL ONCE
Refills: 0 | Status: COMPLETED | OUTPATIENT
Start: 2019-06-28 | End: 2019-06-28

## 2019-06-28 RX ORDER — BUPROPION HYDROCHLORIDE 150 MG/1
300 TABLET, EXTENDED RELEASE ORAL DAILY
Refills: 0 | Status: DISCONTINUED | OUTPATIENT
Start: 2019-06-28 | End: 2019-07-02

## 2019-06-28 RX ORDER — SODIUM CHLORIDE 9 MG/ML
1000 INJECTION, SOLUTION INTRAVENOUS
Refills: 0 | Status: DISCONTINUED | OUTPATIENT
Start: 2019-06-28 | End: 2019-07-02

## 2019-06-28 RX ORDER — IVABRADINE 7.5 MG/1
5 TABLET, FILM COATED ORAL
Refills: 0 | Status: DISCONTINUED | OUTPATIENT
Start: 2019-06-28 | End: 2019-07-02

## 2019-06-28 RX ORDER — ENOXAPARIN SODIUM 100 MG/ML
40 INJECTION SUBCUTANEOUS EVERY 24 HOURS
Refills: 0 | Status: DISCONTINUED | OUTPATIENT
Start: 2019-06-28 | End: 2019-07-02

## 2019-06-28 RX ORDER — ACETAMINOPHEN 500 MG
650 TABLET ORAL EVERY 6 HOURS
Refills: 0 | Status: DISCONTINUED | OUTPATIENT
Start: 2019-06-28 | End: 2019-07-02

## 2019-06-28 RX ORDER — IBUPROFEN 200 MG
400 TABLET ORAL EVERY 6 HOURS
Refills: 0 | Status: DISCONTINUED | OUTPATIENT
Start: 2019-06-28 | End: 2019-07-02

## 2019-06-28 RX ORDER — CHLORHEXIDINE GLUCONATE 213 G/1000ML
1 SOLUTION TOPICAL
Refills: 0 | Status: DISCONTINUED | OUTPATIENT
Start: 2019-06-28 | End: 2019-07-02

## 2019-06-28 RX ORDER — GABAPENTIN 400 MG/1
300 CAPSULE ORAL DAILY
Refills: 0 | Status: DISCONTINUED | OUTPATIENT
Start: 2019-06-28 | End: 2019-06-28

## 2019-06-28 RX ORDER — GABAPENTIN 400 MG/1
300 CAPSULE ORAL
Refills: 0 | Status: DISCONTINUED | OUTPATIENT
Start: 2019-06-28 | End: 2019-07-02

## 2019-06-28 RX ADMIN — SODIUM CHLORIDE 400 MILLILITER(S): 9 INJECTION, SOLUTION INTRAVENOUS at 07:10

## 2019-06-28 RX ADMIN — Medication 650 MILLIGRAM(S): at 10:23

## 2019-06-28 RX ADMIN — Medication 650 MILLIGRAM(S): at 09:27

## 2019-06-28 RX ADMIN — SODIUM CHLORIDE 400 MILLILITER(S): 9 INJECTION, SOLUTION INTRAVENOUS at 22:28

## 2019-06-28 RX ADMIN — SODIUM CHLORIDE 400 MILLILITER(S): 9 INJECTION, SOLUTION INTRAVENOUS at 04:49

## 2019-06-28 RX ADMIN — GABAPENTIN 300 MILLIGRAM(S): 400 CAPSULE ORAL at 04:50

## 2019-06-28 RX ADMIN — CHLORHEXIDINE GLUCONATE 1 APPLICATION(S): 213 SOLUTION TOPICAL at 12:04

## 2019-06-28 RX ADMIN — PYRIDOSTIGMINE BROMIDE 60 MILLIGRAM(S): 60 SOLUTION ORAL at 12:03

## 2019-06-28 RX ADMIN — IVABRADINE 5 MILLIGRAM(S): 7.5 TABLET, FILM COATED ORAL at 17:14

## 2019-06-28 RX ADMIN — BUPROPION HYDROCHLORIDE 300 MILLIGRAM(S): 150 TABLET, EXTENDED RELEASE ORAL at 12:04

## 2019-06-28 RX ADMIN — GABAPENTIN 300 MILLIGRAM(S): 400 CAPSULE ORAL at 17:14

## 2019-06-28 NOTE — DISCHARGE NOTE PROVIDER - PROVIDER TOKENS
FREE:[LAST:[Carissa],FIRST:[Leobardo],PHONE:[phone]],FAX:[(   )    -]] FREE:[LAST:[Carissa],FIRST:[Leobardo],PHONE:[phone]:[(   )    -]],PROVIDER:[TOKEN:[[id number]]] FREE:[LAST:[Carissa],FIRST:[Leobardo],PHONE:[phone]],FAX:[(   )    -]]

## 2019-06-28 NOTE — H&P ADULT - NSHPLABSRESULTS_GEN_ALL_CORE
CT head w/o contrast (6/27/19)  No evidence of infarct, intracranial hemorrhage, midline shift, mass effect or hydrocephalus  The sella turcica/pituitary gland and cervical-medullary junction appear to be grossly normal  The visualized portions of the sinuses appear to be normal

## 2019-06-28 NOTE — H&P ADULT - PROBLEM SELECTOR PLAN 1
Patient with frequent near syncopal episodes,   -Obtain collateral from [facility] about work-up results  -EKG  -Orthostatics Patient with frequent near syncopal episodes with associated right-sided weakness. Also with reported RLE weakness in the past 2 months. Likely due to combination of underlying POTS and small fiber neuropathy.   CT head with no evidence of infarct, intracranial hemorrhage. EKG showing NSR  -Check Orthostatics  -Will give IV fluid hydration w/ LR (Patient infusions 2 L of Lactated Ringer's daily via PICC line at home)   -Management of small fiber neuropathy and POTS as below Patient with frequent near syncopal episodes with associated right-sided weakness. Also with reported RLE weakness in the past 2 months. Likely due to combination of underlying POTS and small fiber neuropathy.   CT head with no evidence of infarct, intracranial hemorrhage. EKG showing NSR  Orthostatics - negative on admission  -Will give IV fluid hydration w/ LR (Patient infusions 2 L of Lactated Ringer's daily via PICC line at home)   -Management of small fiber neuropathy and POTS as below

## 2019-06-28 NOTE — DISCHARGE NOTE PROVIDER - NSDCFUADDAPPT_GEN_ALL_CORE_FT
I spoke with Dr. Ferrer and set up a follow up appointment on July 16, 2019 at 9am at the [facility]. Please follow up at your appointment with Dr. Ferrer on July 16, 2019 at 9am at the [facility]. Please follow up at your appointment with Dr. Ferrer on July 16, 2019 at 9am at the [facility].    Please contact the office for Dr. Berumen to arrange an outpatient follow up appointment.

## 2019-06-28 NOTE — DISCHARGE NOTE PROVIDER - HOSPITAL COURSE
44 year old female with POT syndrome, small fiber neuropathy, Hashimoto's thyroiditis who presents as transfer from outside hospital after episode of right-sided weakness with near syncope while patient was seeking her PCP. She reported that leading up to this, she often expereinces episode where it feel like "her body goes limp," but they used to only last for minutes. They gradually got worse lasting for hours and nearly a day. She was initially transferred with the plan of giving the patient IVIG, but it was decided that her current weakness and presentation did not make her a candidate. Therefore, an MRI spinal survey was conducted and found ________. Additionally, patient has reported RUQ pain and tenderness and a subsequent RUQ u/s was ordered to evaulate which found _____. 44 year old female with POT syndrome, small fiber neuropathy, Hashimoto's thyroiditis who presents as transfer from outside hospital after episode of right-sided weakness with near syncope while patient was seeking her PCP. She reported that leading up to this, she often expereinces episode where it feel like "her body goes limp," but they used to only last for minutes. They gradually got worse lasting for hours and nearly a day. She was initially transferred with the plan of giving the patient IVIG, but it was decided that her current weakness and presentation did not make her a candidate at the time. To work up the presentation an MRI Cervical, thoracic, and lumbar spine was ordered and found no acute findings to explain her current symptoms.  Additionally, patient has reported RUQ pain and tenderness and a subsequent RUQ u/s was ordered to evaluate which found no signs of gallbladder dysfunction or any other pathological issues that could contribute to the pain. Throughout the hospitalization she experienced episodes of muscle cramps, but overall improved each day. The neuro team ultimately decided to trial a 5 day course of IVIG, however, the medication was unavailable. At that time it was decided to discharge the patient to ______. On day of discharge, she expressed that she felt close to her baseline. 44 year old female with POT syndrome, small fiber neuropathy, Hashimoto's thyroiditis who presents as transfer from outside hospital after episode of right-sided weakness with near syncope while patient was seeking her PCP. She reported that leading up to this, she often expereinces episode where it feel like "her body goes limp," but they used to only last for minutes. They gradually got worse lasting for hours and nearly a day. She was initially transferred with the plan of giving the patient IVIG, but it was decided that her current weakness and presentation did not make her a candidate at the time. To work up the presentation an MRI Cervical, thoracic, and lumbar spine was ordered and found no acute findings to explain her current symptoms.  Additionally, patient has reported RUQ pain and tenderness and a subsequent RUQ u/s was ordered to evaluate which found no signs of gallbladder dysfunction or any other pathological issues that could contribute to the pain. Throughout the hospitalization she experienced episodes of muscle cramps, but overall improved each day. The neuro team ultimately decided to trial a 5 day course of IVIG, however, the medication was unavailable. Instead the dose of pyridostigmine was increased. At that time it was decided to discharge the patient to ______. On day of discharge, she expressed that she felt close to her baseline. 44 year old female with POT syndrome, small fiber neuropathy, Hashimoto's thyroiditis who presents as transfer from outside hospital after episode of right-sided weakness with near syncope while patient was seeking her PCP. She reported that leading up to this, she often expereinces episode where it feel like "her body goes limp," but they used to only last for minutes. They gradually got worse lasting for hours and nearly a day. She was initially transferred with the plan of giving the patient IVIG, but it was decided that her current weakness and presentation did not make her a candidate at the time. To work up the presentation an MRI Cervical, thoracic, and lumbar spine was ordered and found no acute findings to explain her current symptoms.  Additionally, patient has reported RUQ pain and tenderness and a subsequent RUQ u/s was ordered to evaluate which found no signs of gallbladder dysfunction or any other pathological issues that could contribute to the pain. Throughout the hospitalization she experienced episodes of muscle cramps, but overall improved each day. The neuro team ultimately decided to trial a 5 day course of IVIG, however, the medication was unavailable. Instead the dose of pyridostigmine was increased. At that time it was decided to discharge the patient home. On day of discharge, she expressed that she felt close to her baseline. 44 year old female with POT syndrome, small fiber neuropathy, Hashimoto's thyroiditis who presents as transfer from outside hospital after episode of right-sided weakness with near syncope while patient was seeking her PCP. She reported that leading up to this, she often expereinces episode where it feel like "her body goes limp," but they used to only last for minutes. They gradually got worse lasting for hours and nearly a day. She was initially transferred with the plan of giving the patient IVIG, but it was decided that her current weakness and presentation did not make her a candidate at the time. To work up the presentation an MRI Cervical, thoracic, and lumbar spine was ordered and found no acute findings to explain her current symptoms.  Additionally, patient has reported RUQ pain and tenderness and a subsequent RUQ u/s was ordered to evaluate which found no signs of gallbladder dysfunction or any other pathological issues that could contribute to the pain. Throughout the hospitalization she experienced episodes of muscle cramps, but overall improved each day. The neuro team ultimately decided to trial a 5 day course of IVIG, however, the medication was unavailable. Instead the dose of pyridostigmine was increased to 60mg TID and Gabapentin to 300 BID. She was also started on Meclizine 25mg daily. In addition to her neurologic symptoms, the patient was found to have iron deficiency anemia during the hospitalization. She refused oral iron at the time, so she received to IV doses before discharge. Iron supplementation should be continued after discharge. On day of discharge, she expressed that she felt close to her baseline. 44 year old female with POT syndrome, small fiber neuropathy, Hashimoto's thyroiditis who presents as transfer from outside hospital after episode of right-sided weakness with near syncope while patient was seeking her PCP. She reported that leading up to this, she often expereinces episode where it feel like "her body goes limp," but they used to only last for minutes. They gradually got worse lasting for hours and nearly a day. She was initially transferred with the plan of giving the patient IVIG, but it was decided that her current weakness and presentation did not make her a candidate at the time. To work up the presentation an MRI Cervical, thoracic, and lumbar spine was ordered and found no acute findings to explain her current symptoms.  Additionally, patient has reported RUQ pain and tenderness and a subsequent RUQ u/s was ordered to evaluate which found no signs of gallbladder dysfunction or any other pathological issues that could contribute to the pain. Throughout the hospitalization she experienced episodes of muscle cramps, but overall improved each day. The neuro team ultimately decided to trial a 5 day course of IVIG, however, the medication was unavailable. Instead the dose of pyridostigmine was increased to 60mg TID and Gabapentin to 300 BID. She was also started on Meclizine 25mg daily. In addition to her neurologic symptoms, the patient was found to have iron deficiency anemia during the hospitalization. She refused oral iron at the time, so she received two IV doses before discharge. Iron supplementation should be continued after discharge. On day of discharge, she expressed that she felt close to her baseline.         Addendum - IVIG shortage in hospital, will defer to outpatient setting as this is a chronic issue without significant acute deterioration. Dr. Díaz suggested rituximab, however there is insufficient evidence to recommend its use in this case since she has no discernible auto-antibodies     She received IV Iron x 2     She will follow up with her various providers as an outpatient to determine long term plan 44 year old female with POT syndrome, small fiber neuropathy, Hashimoto's thyroiditis who presents as transfer from outside hospital after episode of right-sided weakness with near syncope while patient was seeking her PCP. She reported that leading up to this, she often expereinces episode where it feel like "her body goes limp," but they used to only last for minutes. They gradually got worse lasting for hours and nearly a day. She was initially transferred with the plan of giving the patient IVIG, but it was decided that her current weakness and presentation did not make her a candidate at the time. To work up the presentation an MRI Cervical, thoracic, and lumbar spine was ordered and found no acute findings to explain her current symptoms.  Additionally, patient has reported RUQ pain and tenderness and a subsequent RUQ u/s was ordered to evaluate which found no signs of gallbladder dysfunction or any other pathological issues that could contribute to the pain. Throughout the hospitalization she experienced episodes of muscle cramps, but overall improved each day. The neuro team ultimately decided to trial a 5 day course of IVIG, however, the medication was unavailable. Instead the dose of pyridostigmine was increased to 60mg TID and Gabapentin to 300 BID. She was also started on Meclizine 25mg daily. In addition to her neurologic symptoms, the patient was found to have iron deficiency anemia during the hospitalization. She refused oral iron at the time, so she received two IV doses before discharge. Iron supplementation should be continued after discharge. On day of discharge, she expressed that she felt close to her baseline.         Addendum - IVIG shortage in hospital, will defer to outpatient setting as this is a chronic issue without significant acute deterioration. Dr. Díaz suggested rituximab, however there is insufficient evidence to recommend its use in this case since she has no discernible auto-antibodies     She received IV Iron x 2     She will follow up with her various providers as an outpatient to determine long term plan        discharge diagnosis: autonomic neuropathy leading to near syncope

## 2019-06-28 NOTE — PHYSICAL THERAPY INITIAL EVALUATION ADULT - PERTINENT HX OF CURRENT PROBLEM, REHAB EVAL
44 year old female with POT syndrome, small fiber neuropathy, Hashimoto's thyroiditis who presents as transfer from outside hospital after episode of right-sided weakness with near syncope. While patient was seeking her PCP. Dr. Leobardo Ferrer yesterday in office, she felt sudden-onset lower extremity weakness while standing, especially in the right leg. Soon after also feeling of left facial numbness and extremity numbness

## 2019-06-28 NOTE — H&P ADULT - HISTORY OF PRESENT ILLNESS
44 year old female with POT syndrome and small fiber neuropathy who presents for admission for IVIG.     Of note, patient with [facility] admission from 11/8/18-11/9/18 after presenting with right sided facial numbness. Stroke code called and CT head negative for signs of acute transcortical stroke or hemorrhage. Patient discharged for outpatient follow-up. MRI on 11/14/18 showed small vascular flow-void contacting the superior left cisternal segment of the cranial nerve V at the root entry zone, normal appearance to the bilateral nerve VII and VIII complexes, but otherwise normal MRI of the brain with no evidence of abnormal enhancement and no evidence of acute infarction.    On admission, patient in NAD. VS were: T 98.3, HR 70, /78, R 18, SpO2 96% on RA. 44 year old female with POT syndrome, small fiber neuropathy, Hashimoto's thyroiditis who presents as transfer from outside hospital after near syncopal episode. Patient was seeking her PCP. Dr. Leobardo Ferrer yesterday in office. While walking, patient felt sudden-onset right leg weakness and felt she almost passed out. However, denied any LOC or head injury as patient was able to sit down in a chair. Denied any preceding dizziness or lightheadedness, CP, SOB, or visual changes. Patient with numbness in extremities and face at baseline, and though she felt more numb than her baseline. Patient Denies any recent fevers, chills, chest pain, nausea, vomiting, abdominal pain, diarrhea. Patient then taken to  [facility] in [place], [place] when she had work-up done including EKG and CT head, but she does not know the results of those tests. Patient reports she has been getting recurrent similar near syncope episode lately, as often as once per week. Patient felt her symptoms have been improving but still not back to baseline. Patient has LUE PICC for which she infuses 2L of LR at home daily, which she says helps with her POTS symptoms. She received 1L of LR at [facility] yesterday. Patient requested transfer to [facility] for patient preference.     Of note, patient with [facility] admission from 11/8/18-11/9/18 after presenting with right sided facial numbness. Stroke code called and CT head negative for signs of acute transcortical stroke or hemorrhage. Patient discharged for outpatient follow-up. MRI on 11/14/18 showed small vascular flow-void contacting the superior left cisternal segment of the cranial nerve V at the root entry zone, normal appearance to the bilateral nerve VII and VIII complexes, but otherwise normal MRI of the brain with no evidence of abnormal enhancement and no evidence of acute infarction. Patient also with extensive outside work-up tests done at [facility] in September 2018: EMG that was normal, skin biopsy showed small fiber axnonopathy, and autonomic testing showed significant for dysfunction of sympathetic sudomotor fibers and orthostatic intolerance.  Patient also received IVIg from 9/6-9/8/18 and plans to continue pending insurance approval.     On admission, patient in NAD. VS were: T 98.3, HR 70, /78, R 18, SpO2 96% on RA. Still reports numbness in bilateral face and extremities and feels weak and unable to ambulate. 44 year old female with POT syndrome, small fiber neuropathy, Hashimoto's thyroiditis who presents as transfer from outside hospital after episode of right-sided weakness with near syncope. While patient was seeking her PCP. Dr. Leobardo Ferrer yesterday in office, she felt sudden-onset lower extremity weakness while standing, especially in the right leg. Soon after also feeling of left facial numbness and extremity numbness. However, denied any LOC or head injury as patient was able to sit down in a chair. Denied any preceding dizziness or lightheadedness, CP, SOB, or visual changes. Patient Denies any recent fevers, chills, chest pain, nausea, vomiting, abdominal pain, diarrhea. Patient taken by EMS to [facility] in [place], [place] when she had work-up done including CT head with no evidence of infarct, intracranial hemorrhage. EKG showing NSR, CXR with clear lungs, labs including CBC and CMP unremarkable. VS were stable per records. She received 1L of NS. Patient requested transfer to [facility] for patient preference.     Patient reports she has been getting recurrent similar episodes of right-sided weakness and near syncope episode lately, as often as once per week. She has been having right leg weakness for the past 2 months. Patient felt her symptoms of numbness have been improving but still not back to baseline. Continues to feel weak in upper extremities and especially in RLE. Patient has LUE PICC for which she routinely infuses 2L of LR at home daily, which she says helps with her POTS symptoms.     Of note, patient with [facility] admission from 11/8/18-11/9/18 after presenting with right sided facial numbness. Stroke code called and CT head negative for signs of acute transcortical stroke or hemorrhage. Patient discharged for outpatient follow-up. MRI on 11/14/18 showed small vascular flow-void contacting the superior left cisternal segment of the cranial nerve V at the root entry zone, normal appearance to the bilateral nerve VII and VIII complexes, but otherwise normal MRI of the brain with no evidence of abnormal enhancement and no evidence of acute infarction. Patient also with extensive outside work-up tests done at [facility] in [place]: EMG that was normal, skin biopsy showed small fiber axnonopathy, and autonomic testing showed significant for dysfunction of sympathetic sudomotor fibers and orthostatic intolerance. Patient also received IVIg from 9/6-9/8/18 and plans to continue pending insurance approval.     On admission, patient in NAD. VS were: T 98.3, HR 70, /78, R 18, SpO2 96% on RA. Still reports numbness in bilateral face and extremities and feels weak and unable to ambulate.

## 2019-06-28 NOTE — H&P ADULT - PROBLEM SELECTOR PLAN 4
History of Grave's disease, then converted to Hashimoto's. Takes Armor Thyroid 60 mg daily.  -Check TSH  -Continue home thyroid supplementation (patient can bring own supply)

## 2019-06-28 NOTE — CONSULT NOTE ADULT - SUBJECTIVE AND OBJECTIVE BOX
HPI:  44 year old female with POT syndrome, small fiber neuropathy, Hashimoto's thyroiditis who presents as transfer from outside hospital after episode of right-sided weakness with near syncope. While patient was seeking her PCP. Dr. Leobardo Ferrer yesterday in office, she felt sudden-onset lower extremity weakness while standing, especially in the right leg. Soon after also feeling of left facial numbness and extremity numbness. However, denied any LOC or head injury as patient was able to sit down in a chair. Denied any preceding dizziness or lightheadedness, CP, SOB, or visual changes. Patient Denies any recent fevers, chills, chest pain, nausea, vomiting, abdominal pain, diarrhea. Patient taken by EMS to [facility] in [place], [place] when she had work-up done including CT head with no evidence of infarct, intracranial hemorrhage. EKG showing NSR, CXR with clear lungs, labs including CBC and CMP unremarkable. VS were stable per records. She received 1L of NS. Patient requested transfer to [facility] for patient preference.     Patient reports she has been getting recurrent similar episodes of right-sided weakness and near syncope episode lately, as often as once per week. She has been having right leg weakness for the past 2 months. Patient felt her symptoms of numbness have been improving but still not back to baseline. Continues to feel weak in upper extremities and especially in RLE. Patient has LUE PICC for which she routinely infuses 2L of LR at home daily, which she says helps with her POTS symptoms.     Of note, patient with [facility] admission from 11/8/18-11/9/18 after presenting with right sided facial numbness. Stroke code called and CT head negative for signs of acute transcortical stroke or hemorrhage. Patient discharged for outpatient follow-up. MRI on 11/14/18 showed small vascular flow-void contacting the superior left cisternal segment of the cranial nerve V at the root entry zone, normal appearance to the bilateral nerve VII and VIII complexes, but otherwise normal MRI of the brain with no evidence of abnormal enhancement and no evidence of acute infarction. Patient also with extensive outside work-up tests done at [facility] in September 2018: EMG that was normal, skin biopsy showed small fiber axnonopathy, and autonomic testing showed significant for dysfunction of sympathetic sudomotor fibers and orthostatic intolerance. Patient also received IVIg from 9/6-9/8/18 and plans to continue pending insurance approval.     On admission, patient in NAD. VS were: T 98.3, HR 70, /78, R 18, SpO2 96% on RA. Still reports numbness in bilateral face and extremities and feels weak and unable to ambulate. (28 Jun 2019 03:17)      PAST MEDICAL & SURGICAL HISTORY:  Small fiber neuropathy  POTS (postural orthostatic tachycardia syndrome)  No significant past surgical history      MEDICATIONS:  acetaminophen   Tablet .. 650 milliGRAM(s) Oral every 6 hours PRN  buPROPion XL . 300 milliGRAM(s) Oral daily  chlorhexidine 2% Cloths 1 Application(s) Topical <User Schedule>  enoxaparin Injectable 40 milliGRAM(s) SubCutaneous every 24 hours  gabapentin 300 milliGRAM(s) Oral two times a day  ibuprofen  Tablet. 400 milliGRAM(s) Oral every 6 hours PRN  lactated ringers. 1000 milliLiter(s) IV Continuous <Continuous>  pyridostigmine 60 milliGRAM(s) Oral daily      No pertinent family history in first degree relatives      SOCIAL HISTORY:    REVIEW OF SYSTEMS:  Constitutional: No fevers.   Card: No chest pain.   Resp: No SOB.  GI: No nausea or vomiting. No abdominal pain.  : No dysuria. Neuro: No focal weakness or sensory loss.  Eyes: No visual symptoms. MS: No joint swelling.  Skin: No rashes. Psych: No psychosis.    PHYSICAL EXAM:    06-27 @ 07:01  -  06-28 @ 07:00  --------------------------------------------------------  IN: 1200 mL / OUT: 0 mL / NET: 1200 mL    06-28 @ 07:01  -  06-28 @ 15:38  --------------------------------------------------------  IN: 0 mL / OUT: 1700 mL / NET: -1700 mL      Constitutional: T(C): 36.7 (06-28-19 @ 08:54), Max: 36.8 (06-28-19 @ 03:06)  HR: 64 (06-28-19 @ 08:54) (64 - 70)  BP: 115/77 (06-28-19 @ 08:54) (115/77 - 125/78)  RR: 16 (06-28-19 @ 08:54) (16 - 18)  SpO2: 99% (06-28-19 @ 08:54) (96% - 99%)  Wt(kg): --  Appearance: Alert, pleasant, INAD.  Eyes: Conjunctivae pink, moist. Pupils equal and reactive.  ENT: External ears/nose normal appearing. Lips normal, dentition good.  Lymphatic: No cervical lymphadenopathy. No supraclavicular lymphadenopathy.  Cardiac: No rubs. NO MURMURS. Extremities: NO LE EDEMA.  Respiratory: Effort no accessory muscle use. Lungs: CLEAR TO AUSCULTATION.  Abdomen: Soft. No masses.  slight right upper quadrant tend. . Spleen: Not palpable.  Musculoskeltal digits: No clubbing or cyanosis. Tone normal. Moves all four extremities.  Skin inspection: No rashes. Palpation: No abnormalities.  Neuro: Cranial nerves: no facial assymetry or deficits noted. Sensation: LE intact to light touch.  Psych: Oriented to person, place, situation. Mood/affect: Not depressed.     DATA:  138    |  106    |  12     ----------------------------<  82     Ca:8.9   (28 Jun 2019 06:56)  4.0     |  21<L>  |  0.73       eGFR if Non : 99  eGFR if : 115    TPro  6.8    /  Alb  3.7    /  TBili  0.4    /  DBili  x      /  AST  24     /  ALT  19     /  AlkPhos  44     28 Jun 2019 06:56                        10.4<L>  5.53  )-----------( 208      ( 28 Jun 2019 06:56 )             34.5

## 2019-06-28 NOTE — H&P ADULT - PROBLEM SELECTOR PLAN 5
Fluids: not needed  Electrolytes: monitor and correct as needed  Nutrition: DASH/TLC diet     Code: Full code  DVT prophylaxis: enoxaparin SQ  GI prophylaxis: not needed  Dispo: admit to Medicine

## 2019-06-28 NOTE — DISCHARGE NOTE PROVIDER - NSDCCPCAREPLAN_GEN_ALL_CORE_FT
PRINCIPAL DISCHARGE DIAGNOSIS  Diagnosis: Near syncope  Assessment and Plan of Treatment: You were admitted for a near syncopal episode.      SECONDARY DISCHARGE DIAGNOSES  Diagnosis: Iron deficiency anemia, unspecified iron deficiency anemia type  Assessment and Plan of Treatment: During your hospitalization you were would to have a low ferratin. Ferratin is.... PRINCIPAL DISCHARGE DIAGNOSIS  Diagnosis: Near syncope  Assessment and Plan of Treatment: You were admitted for a near syncopal episode. Syncope is also called fainting or passing out. It is characterized by a sudden, temporary loss of consciousness, followed by a fall from a standing or sitting position. This is something you have a known history of. Continue to follow up with your primary to continue managment.         SECONDARY DISCHARGE DIAGNOSES  Diagnosis: Iron deficiency anemia, unspecified iron deficiency anemia type  Assessment and Plan of Treatment: This anemia is most likely due to iron deficiency caused most commonly by inadequate intake of iron in your diet. Iron is important because it is part of your blood and helps carry oxygen throughout your body. PRINCIPAL DISCHARGE DIAGNOSIS  Diagnosis: Near syncope  Assessment and Plan of Treatment: You were admitted for a near syncopal episode. Syncope is also called fainting or passing out. It is characterized by a sudden, temporary loss of consciousness, followed by a fall from a standing or sitting position. This is something you have a known history of. Continue to follow up with your primary to continue managment. Remember that your pyridostigmine dose was increased to 60mg 3 times a day, your gabapentin was increased to 300mg 2 times a day, and you were started on meclizine 25mg daily.        SECONDARY DISCHARGE DIAGNOSES  Diagnosis: Iron deficiency anemia, unspecified iron deficiency anemia type  Assessment and Plan of Treatment: This anemia is most likely due to iron deficiency caused most commonly by inadequate intake of iron in your diet. Iron is important because it is part of your blood and helps carry oxygen throughout your body. Although you do not want oral supplements, it is important for you to continue getting treatment for this. PRINCIPAL DISCHARGE DIAGNOSIS  Diagnosis: Near syncope  Assessment and Plan of Treatment: You were admitted for a near syncopal episode. Syncope is also called fainting or passing out. It is characterized by a sudden, temporary loss of consciousness, followed by a fall from a standing or sitting position. This is something you have a known history of. Continue to follow up with your primary to continue managment. Remember that your pyridostigmine dose was increased to 60mg 3 times a day (from daily), your gabapentin was increased to 300mg 2 times a day (from daily), and you were started on meclizine 25mg daily.        SECONDARY DISCHARGE DIAGNOSES  Diagnosis: Iron deficiency anemia, unspecified iron deficiency anemia type  Assessment and Plan of Treatment: This anemia is most likely due to iron deficiency caused most commonly by inadequate intake of iron in your diet. Iron is important because it is part of your blood and helps carry oxygen throughout your body. Although you do not want oral supplements, it is important for you to continue getting treatment for this. Talk to Dr. Ferrer about how this can best be managed. PRINCIPAL DISCHARGE DIAGNOSIS  Diagnosis: Near syncope  Assessment and Plan of Treatment: You were admitted for a near syncopal episode. Syncope is also called fainting or passing out. It is characterized by a sudden, temporary loss of consciousness, followed by a fall from a standing or sitting position. This is something you have a known history of. Continue to follow up with your primary to continue managment. Remember that your pyridostigmine dose was increased to 60mg 3 times a day (from daily), your gabapentin was increased to 300mg 2 times a day (from daily), and you were started on meclizine 25mg daily. Additionaly, Dr. Gutierrez was notified that you need your fluids reinstated and she said that she will get that done for you.        SECONDARY DISCHARGE DIAGNOSES  Diagnosis: Iron deficiency anemia, unspecified iron deficiency anemia type  Assessment and Plan of Treatment: This anemia is most likely due to iron deficiency caused most commonly by inadequate intake of iron in your diet. Iron is important because it is part of your blood and helps carry oxygen throughout your body. Although you do not want oral supplements, it is important for you to continue getting treatment for this. Talk to Dr. Ferrer about how this can best be managed.

## 2019-06-28 NOTE — PHYSICAL THERAPY INITIAL EVALUATION ADULT - ADDITIONAL COMMENTS
Pt reports living with her children in an apartment which has 1 flight to enter building and 1 flight within apartment. She expressed being independent in all ADLs, gait, and stairs prior to this latest episode

## 2019-06-28 NOTE — H&P ADULT - PROBLEM SELECTOR PLAN 6
1) PCP Contacted on Admission: (Y/N) --> Name & Phone #: Dr. Leobardo Ferrer  2) Date of Contact with PCP:  3) PCP Contacted at Discharge: (Y/N, N/A)  4) Summary of Handoff Given to PCP:   5) Post-Discharge Appointment Date and Location:

## 2019-06-28 NOTE — H&P ADULT - ATTENDING COMMENTS
I was physically present for the key portions of the evaluation and managemnent (E/M) service provided.  I agree with the above history, physical, and plan which I have reviewed and edited where appropriate, with the exceptions as per my note.    pt seen and examined on 6/28/19.    pt w complaint of whole body weakness. tells me that she notes that she can smile spontaneously normally but when asked to show her teeth or smile on command, she cannot. she also notes that she is able to move her extremities normally when she doesn't pay attention to them, but then cannot when examined to confrontation or asked to lift on command.    neuro exam  ao3, normal language  perrl, eomi, face symm but with dec activation bl on commmand, tup ml, no nyst, vff  substantial giveway weakness and highly variable effort R>L, at least 5- in all muscle groups tested but limited by functionality of exam  sens intact on all 4.  dtrs symmetric, toes down bl.  coord intact to ftn, htn, marge  gait - pt is able to rise on her own, however stands in place and says that she "needs to find her feet", and appears to be unable to initiate the movement.    AP: hx of pots, small fiber neuropathy. however, pt now with c/o diffuse weakness. she has substantial functional overlay on exam.    - spinal survey, though highly doubt myelopathy as no e/o this on exam  - inc gabapentin to 300 bid for painful sensations likely related to sfn.  - no indication for IVIG at this time. pt had a substantial adverse reaction to ivig in the past, and reports being unwell from it for up to 3wks. therefore, it is absolutely not indicated at this time.  - dvt ppx  - PT  - dispo planning

## 2019-06-28 NOTE — DISCHARGE NOTE PROVIDER - NSDCCAREPROVSEEN_GEN_ALL_CORE_FT
Brenna Houston-Attending physician  Ernie Sweeney Brenna Houston- Attending Physician  Ernie Sweeney- Resident Physician

## 2019-06-28 NOTE — H&P ADULT - PROBLEM SELECTOR PLAN 2
[place] [facility] [place]G normal, skin biopsy showed small fiber axnonopathy  Has seen Dr. Berumen and Dr. Najjar as outpatient  Has has prior IVIg infusions.   -c/w gabapentin 300 mg QD   -c/w pyridostigmine 60 mg QD  -consider repeat IVIg infusion on this admission Work[place] [facility] [place] in [place]: EMG normal, skin biopsy showed small fiber axnonopathy  Has seen Dr. Berumen and Dr. Najjar as outpatient  Has had prior IVIg infusions, last time in September 2018 while at [facility] [place]. Was planning to start outpatient IVIg infusions, but delayed due to lack of insurance coverage.   -c/w gabapentin 300 mg QD   -c/w pyridostigmine 60 mg QD  -consider  IVIG infusion on this admission

## 2019-06-28 NOTE — DISCHARGE NOTE PROVIDER - CARE PROVIDER_API CALL
Leobardo Ferrer  Phone: [phone]  Fax: (   )    -  Follow Up Time: Leobardo Ferrer  Phone: [phone]  Fax: (   )    -  Follow Up Time:     Abhinav Berumen)  Neurology; Neuromuscular Medicine  [address], [address] [place], [place] [address]  Phone: [phone]  Fax: [phone]  Follow Up Time: Leobardo Ferrer  Phone: [phone]  Fax: (   )    -  Follow Up Time:

## 2019-06-28 NOTE — H&P ADULT - NSHPPHYSICALEXAM_GEN_ALL_CORE
GENERAL: No acute distress. Appears stated age. Moving all extremities   HEENT:  Atraumatic, Normocephalic, conjunctiva and sclera clear, oropharynx clear, moist mucous membranes   NECK: Supple, No JVD  CHEST: no gross deformities   LUNG: Clear to auscultation bilaterally; No wheezing, rales, rhonchi, or stridor  HEART: Regular rate and rhythm; S1 and S2 audible; No murmurs, rubs, or gallops  ABDOMEN: Soft, Nontender, Nondistended; Bowel sounds present in all four quadrants  EXTREMITIES:  2+ Peripheral Pulses bilaterally, No clubbing, cyanosis, or edema  SKIN: No rashes or lesions  NEUROLOGY:   awake, alert, oriented to person place and time  CNs II-XII intact bilaterally  Strength: 5/5 in all 4 extremities  Sensation: intact to light touch and noxious stimuli  Reflexes: 2+ biceps, triceps, brachioradialis, patellar, and Achilles; normal plantar reflex bilaterally   Cerebellar: intact on finger-to-nose and heel-to-shin bilaterally GENERAL: No acute distress. Appears stated age. Moving all extremities   HEENT:  Atraumatic, Normocephalic, conjunctiva and sclera clear, oropharynx clear, moist mucous membranes   NECK: Supple  CHEST: no gross deformities   LUNG: Clear to auscultation bilaterally; No wheezing, rales, rhonchi, or stridor  HEART: Regular rate and rhythm; S1 and S2 audible; No murmurs, rubs, or gallops  ABDOMEN: Soft, Nontender, Nondistended; Bowel sounds present in all four quadrants  EXTREMITIES:  2+ Peripheral Pulses bilaterally, No clubbing, cyanosis, or edema; LUE PICC line in place   SKIN: No rashes or lesions  NEUROLOGY:   awake, alert, oriented to person place and time  CNs II-XII intact bilaterally except for decreased facial sensation   Strength: 5/5 in all 4 extremities  Sensation: diminished sensation to noxious stimuli in all 4 extremities   Cerebellar: intact on finger-to-nose bilaterally GENERAL: No acute distress. Appears stated age. Moving all extremities   HEENT:  Atraumatic, Normocephalic, conjunctiva and sclera clear, oropharynx clear, moist mucous membranes   NECK: Supple  CHEST: no gross deformities   LUNG: Clear to auscultation bilaterally; No wheezing, rales, rhonchi, or stridor  HEART: Regular rate and rhythm; S1 and S2 audible; No murmurs, rubs, or gallops  ABDOMEN: Soft, Nontender, Nondistended; Bowel sounds present in all four quadrants  EXTREMITIES:  2+ Peripheral Pulses bilaterally, No clubbing, cyanosis, or edema; LUE PICC line in place   SKIN: No rashes or lesions  NEUROLOGY:   awake, alert, oriented to person place and time  CNs II-XII intact bilaterally except for decreased facial sensation on left side   Strength: 4/5 strength in  bilateral upper extremities, hand  strength 4/4; 5/5 strength in LLE, 3/5 strength in RLE   Sensation: normal sensation to light touch and pain in all 4 extremities   Cerebellar: intact on finger-to-nose bilaterally  Gait: deferred as patient declined

## 2019-06-28 NOTE — H&P ADULT - PROBLEM SELECTOR PLAN 3
Diagnosed in September 2018 after autonomic testing showed dysfunction of sympathetic sudomotor fibers and orthostatic intolerance  Gets 2 L of Lactated Ringer's infusion daily via PICC line at home   -c/w ivabradine 5 mg QD (not on formulary, pt can take home supply)  -c/w Bupropion 300 mg QD  -daily LR Diagnosed in September 2018 after autonomic testing showed dysfunction of sympathetic sudomotor fibers and orthostatic intolerance  Patient infusions 2 L of Lactated Ringer's daily via PICC line at home   -c/w ivabradine 5 mg QD (not on formulary, pt can take home supply)  -c/w Bupropion 300 mg QD  -daily LR infusions  -Check orthostatics Diagnosed in September 2018 after autonomic testing showed dysfunction of sympathetic sudomotor fibers and orthostatic intolerance  Patient infusions 2 L of Lactated Ringer's daily via PICC line at home   Orthostatics - negative on admission  -c/w ivabradine 5 mg QD (not on formulary, pt can take home supply)  -c/w Bupropion 300 mg QD  -daily IVF infusions

## 2019-06-28 NOTE — PATIENT PROFILE ADULT - BRADEN MOBILITY
October 3, 2018      [facility]  [address]  [place] [address]  Phone: [phone]  Fax: [phone]       Patient: Jonas Turner   YOB: 2004  Date of Visit: 10/03/2018    To Whom It May Concern:    Rodney Turner  was at [facility] on 10/03/2018. He may return to work/school on 10/4/18 with no restrictions. If you have any questions or concerns, or if I can be of further assistance, please do not hesitate to contact me.    Sincerely,    Malachi Mccray MD     
(4) no limitation

## 2019-06-28 NOTE — H&P ADULT - ASSESSMENT
44 year old female with POT syndrome, small fiber neuropathy, Hashimoto's thyroiditis who presents as transfer from outside hospital after episode of right-sided weakness with near syncope.

## 2019-06-28 NOTE — CONSULT NOTE ADULT - ASSESSMENT
pt well known to me, and excellent summary above reviewed in detail   .  more info recorded in allscripts record in both neurology and my previous notes.    pt transferred for possible admin of iv ig, but that now on hold due to prior intolerance and her current state of weakness.   will proceed with w/u as per neurology, and also request abd sono.  discussed with staff.

## 2019-06-28 NOTE — DISCHARGE NOTE PROVIDER - CARE PROVIDERS DIRECT ADDRESSES
,DirectAddress_Unknown ,DirectAddress_Unknown,[email or web address]

## 2019-06-29 LAB
ANION GAP SERPL CALC-SCNC: 11 MMOL/L — SIGNIFICANT CHANGE UP (ref 5–17)
APPEARANCE UR: CLEAR — SIGNIFICANT CHANGE UP
BASOPHILS # BLD AUTO: 0.04 K/UL — SIGNIFICANT CHANGE UP (ref 0–0.2)
BASOPHILS NFR BLD AUTO: 0.8 % — SIGNIFICANT CHANGE UP (ref 0–2)
BILIRUB UR-MCNC: NEGATIVE — SIGNIFICANT CHANGE UP
BUN SERPL-MCNC: 7 MG/DL — SIGNIFICANT CHANGE UP (ref 7–23)
CALCIUM SERPL-MCNC: 9 MG/DL — SIGNIFICANT CHANGE UP (ref 8.4–10.5)
CHLORIDE SERPL-SCNC: 107 MMOL/L — SIGNIFICANT CHANGE UP (ref 96–108)
CO2 SERPL-SCNC: 24 MMOL/L — SIGNIFICANT CHANGE UP (ref 22–31)
COLOR SPEC: YELLOW — SIGNIFICANT CHANGE UP
CREAT SERPL-MCNC: 0.67 MG/DL — SIGNIFICANT CHANGE UP (ref 0.5–1.3)
DIFF PNL FLD: NEGATIVE — SIGNIFICANT CHANGE UP
EOSINOPHIL # BLD AUTO: 0.18 K/UL — SIGNIFICANT CHANGE UP (ref 0–0.5)
EOSINOPHIL NFR BLD AUTO: 3.8 % — SIGNIFICANT CHANGE UP (ref 0–6)
FERRITIN SERPL-MCNC: 12 NG/ML — LOW (ref 15–150)
GLUCOSE SERPL-MCNC: 85 MG/DL — SIGNIFICANT CHANGE UP (ref 70–99)
GLUCOSE UR QL: NEGATIVE — SIGNIFICANT CHANGE UP
HCT VFR BLD CALC: 32.8 % — LOW (ref 34.5–45)
HGB BLD-MCNC: 9.9 G/DL — LOW (ref 11.5–15.5)
IMM GRANULOCYTES NFR BLD AUTO: 0.2 % — SIGNIFICANT CHANGE UP (ref 0–1.5)
IRON SATN MFR SERPL: 26 % — SIGNIFICANT CHANGE UP (ref 14–50)
IRON SATN MFR SERPL: 91 UG/DL — SIGNIFICANT CHANGE UP (ref 30–160)
KETONES UR-MCNC: NEGATIVE — SIGNIFICANT CHANGE UP
LEUKOCYTE ESTERASE UR-ACNC: NEGATIVE — SIGNIFICANT CHANGE UP
LYMPHOCYTES # BLD AUTO: 1.94 K/UL — SIGNIFICANT CHANGE UP (ref 1–3.3)
LYMPHOCYTES # BLD AUTO: 40.4 % — SIGNIFICANT CHANGE UP (ref 13–44)
MAGNESIUM SERPL-MCNC: 1.4 MG/DL — LOW (ref 1.6–2.6)
MCHC RBC-ENTMCNC: 24 PG — LOW (ref 27–34)
MCHC RBC-ENTMCNC: 30.2 GM/DL — LOW (ref 32–36)
MCV RBC AUTO: 79.6 FL — LOW (ref 80–100)
MONOCYTES # BLD AUTO: 0.58 K/UL — SIGNIFICANT CHANGE UP (ref 0–0.9)
MONOCYTES NFR BLD AUTO: 12.1 % — SIGNIFICANT CHANGE UP (ref 2–14)
NEUTROPHILS # BLD AUTO: 2.05 K/UL — SIGNIFICANT CHANGE UP (ref 1.8–7.4)
NEUTROPHILS NFR BLD AUTO: 42.7 % — LOW (ref 43–77)
NITRITE UR-MCNC: NEGATIVE — SIGNIFICANT CHANGE UP
NRBC # BLD: 0 /100 WBCS — SIGNIFICANT CHANGE UP (ref 0–0)
PH UR: 8 — SIGNIFICANT CHANGE UP (ref 5–8)
PLATELET # BLD AUTO: 197 K/UL — SIGNIFICANT CHANGE UP (ref 150–400)
POTASSIUM SERPL-MCNC: 3.7 MMOL/L — SIGNIFICANT CHANGE UP (ref 3.5–5.3)
POTASSIUM SERPL-SCNC: 3.7 MMOL/L — SIGNIFICANT CHANGE UP (ref 3.5–5.3)
PROT UR-MCNC: NEGATIVE MG/DL — SIGNIFICANT CHANGE UP
RBC # BLD: 4.12 M/UL — SIGNIFICANT CHANGE UP (ref 3.8–5.2)
RBC # BLD: 4.12 M/UL — SIGNIFICANT CHANGE UP (ref 3.8–5.2)
RBC # FLD: 15.3 % — HIGH (ref 10.3–14.5)
RETICS #: 46.1 K/UL — SIGNIFICANT CHANGE UP (ref 25–125)
RETICS/RBC NFR: 1.1 % — SIGNIFICANT CHANGE UP (ref 0.5–2.5)
SODIUM SERPL-SCNC: 142 MMOL/L — SIGNIFICANT CHANGE UP (ref 135–145)
SP GR SPEC: 1.01 — SIGNIFICANT CHANGE UP (ref 1–1.03)
TIBC SERPL-MCNC: 348 UG/DL — SIGNIFICANT CHANGE UP (ref 220–430)
TRANSFERRIN SERPL-MCNC: 293 MG/DL — SIGNIFICANT CHANGE UP (ref 200–360)
UIBC SERPL-MCNC: 257 UG/DL — SIGNIFICANT CHANGE UP (ref 110–370)
UROBILINOGEN FLD QL: 0.2 E.U./DL — SIGNIFICANT CHANGE UP
WBC # BLD: 4.8 K/UL — SIGNIFICANT CHANGE UP (ref 3.8–10.5)
WBC # FLD AUTO: 4.8 K/UL — SIGNIFICANT CHANGE UP (ref 3.8–10.5)

## 2019-06-29 PROCEDURE — 76700 US EXAM ABDOM COMPLETE: CPT | Mod: 26

## 2019-06-29 PROCEDURE — 72148 MRI LUMBAR SPINE W/O DYE: CPT | Mod: 26

## 2019-06-29 PROCEDURE — 99233 SBSQ HOSP IP/OBS HIGH 50: CPT

## 2019-06-29 PROCEDURE — 72146 MRI CHEST SPINE W/O DYE: CPT | Mod: 26

## 2019-06-29 PROCEDURE — 72141 MRI NECK SPINE W/O DYE: CPT | Mod: 26

## 2019-06-29 RX ORDER — MAGNESIUM SULFATE 500 MG/ML
2 VIAL (ML) INJECTION ONCE
Refills: 0 | Status: COMPLETED | OUTPATIENT
Start: 2019-06-29 | End: 2019-06-29

## 2019-06-29 RX ORDER — FERROUS SULFATE 325(65) MG
325 TABLET ORAL
Refills: 0 | Status: DISCONTINUED | OUTPATIENT
Start: 2019-06-29 | End: 2019-07-02

## 2019-06-29 RX ORDER — POTASSIUM CHLORIDE 20 MEQ
40 PACKET (EA) ORAL ONCE
Refills: 0 | Status: COMPLETED | OUTPATIENT
Start: 2019-06-29 | End: 2019-06-29

## 2019-06-29 RX ADMIN — GABAPENTIN 300 MILLIGRAM(S): 400 CAPSULE ORAL at 06:52

## 2019-06-29 RX ADMIN — Medication 50 GRAM(S): at 12:14

## 2019-06-29 RX ADMIN — SODIUM CHLORIDE 200 MILLILITER(S): 9 INJECTION, SOLUTION INTRAVENOUS at 12:14

## 2019-06-29 RX ADMIN — GABAPENTIN 300 MILLIGRAM(S): 400 CAPSULE ORAL at 17:35

## 2019-06-29 RX ADMIN — CHLORHEXIDINE GLUCONATE 1 APPLICATION(S): 213 SOLUTION TOPICAL at 06:52

## 2019-06-29 RX ADMIN — Medication 15 MILLIGRAM(S): at 06:52

## 2019-06-29 RX ADMIN — SODIUM CHLORIDE 400 MILLILITER(S): 9 INJECTION, SOLUTION INTRAVENOUS at 06:52

## 2019-06-29 RX ADMIN — Medication 650 MILLIGRAM(S): at 06:53

## 2019-06-29 RX ADMIN — IVABRADINE 5 MILLIGRAM(S): 7.5 TABLET, FILM COATED ORAL at 06:52

## 2019-06-29 RX ADMIN — SODIUM CHLORIDE 200 MILLILITER(S): 9 INJECTION, SOLUTION INTRAVENOUS at 21:52

## 2019-06-29 RX ADMIN — BUPROPION HYDROCHLORIDE 300 MILLIGRAM(S): 150 TABLET, EXTENDED RELEASE ORAL at 12:14

## 2019-06-29 RX ADMIN — PYRIDOSTIGMINE BROMIDE 60 MILLIGRAM(S): 60 SOLUTION ORAL at 12:14

## 2019-06-29 RX ADMIN — Medication 40 MILLIEQUIVALENT(S): at 12:14

## 2019-06-29 RX ADMIN — Medication 650 MILLIGRAM(S): at 05:32

## 2019-06-29 NOTE — PROGRESS NOTE ADULT - ASSESSMENT
44 year old female with POT syndrome, small fiber neuropathy, Hashimoto's thyroiditis who presents as transfer from outside hospital after episode of right-sided weakness with near syncope concern for worsening neurologic condition vs stress related cause.

## 2019-06-29 NOTE — PROGRESS NOTE ADULT - SUBJECTIVE AND OBJECTIVE BOX
Pt reports no change in her symptoms.  Awaiting US for RUQ pain.  Spinal imaging order for report of intermittent whole body weakness and stiffness.  Reports that strength testing being able to push for a second and immediately let go.        exam unchanged  MS intact  CN 2-12 intact  5/5 strnegth throughout with prominent giveway weakness in all extrem  sensory exam intact  reflexes symm, toes downgoing  FTN slow but appear effort limited, no dysmetria

## 2019-06-29 NOTE — PROGRESS NOTE ADULT - PROBLEM SELECTOR PLAN 2
Work[place]up at [facility] [place] in [place]: EMG normal, skin biopsy showed small fiber axnonopathy  Has seen Dr. Berumen and Dr. Najjar as outpatient  Has had prior IVIg infusions, last time in September 2018 while at [facility] [place]. Was planning to start outpatient IVIg infusions, but delayed due to lack of insurance coverage.   -Increased gabapentin 300 mg BID   -c/w pyridostigmine 60 mg QD  -IVIG infusion not planned on this admission

## 2019-06-29 NOTE — PROGRESS NOTE ADULT - ASSESSMENT
43 yo f with hx of POTS, hashimotos thyroiditis and SFN dx'd on biopsy with recent syncope and report of recent intermittent limb weakness that is momentary.  Reports episodes like this multiple times in the past with spontaneous resolution in between.  Has been on IVIG in the past without improvement, with side effect of severe aseptic meningiitis    although pt has POTS, biopsy proven SFN, no evidence of an organic process causing her limb weakness.  Exam suggestive of conversion disorder.   Ordered for spinal imaging to r/o myelopathic process although unlikely.  Would hold off on any additional therapy aside from continuing gabapentin.    Plan  RUQ us for abd pain  spinal imaging  PT  cont home meds

## 2019-06-29 NOTE — PROGRESS NOTE ADULT - PROBLEM SELECTOR PLAN 3
Diagnosed in September 2018 after autonomic testing showed dysfunction of sympathetic sudomotor fibers and orthostatic intolerance  Patient infusions 2 L of Lactated Ringer's daily via PICC line at home   Orthostatics - negative on admission  -c/w ivabradine 5 mg QD (not on formulary, pt can take home supply)  -c/w Bupropion 300 mg QD  -daily IVF infusions

## 2019-06-29 NOTE — PROGRESS NOTE ADULT - PROBLEM SELECTOR PLAN 4
History of Grave's disease, then converted to Hashimoto's. Takes Armor Thyroid 60 mg daily.  -TSH WNL  -Continue home thyroid supplementation (patient can bring own supply)

## 2019-06-29 NOTE — PROGRESS NOTE ADULT - SUBJECTIVE AND OBJECTIVE BOX
OVERNIGHT EVENTS:    SUBJECTIVE / INTERVAL HPI: Patient reports that overnight while getting up to use the bathroom that she had an episode of weakness. She continued to express concern of her weakness but is looking forward to the MRI. Patient seen and examined at bedside.     VITAL SIGNS:  Vital Signs Last 24 Hrs  T(C): 36.8 (2019 16:39), Max: 36.9 (2019 21:42)  T(F): 98.3 (2019 16:39), Max: 98.5 (2019 21:42)  HR: 78 (2019 16:39) (62 - 78)  BP: 115/76 (2019 16:39) (115/76 - 130/83)  BP(mean): --  RR: 16 (2019 16:39) (15 - 16)  SpO2: 97% (2019 16:39) (97% - 98%)      19 @ 07:01  -  19 @ 07:00  --------------------------------------------------------  IN: 4000 mL / OUT: 7350 mL / NET: -3350 mL        PHYSICAL EXAM:    General: WDWN  HEENT: NC/AT; PERRL, anicteric sclera; MMM  Neck: supple  Cardiovascular: +S1/S2, RRR  Respiratory: CTA B/L; no W/R/R  Gastrointestinal: distended with RUQ tenderness, NT/ND; +BSx4  Extremities: WWP; no edema, clubbing or cyanosis  Vascular: 2+ radial, DP/PT pulses B/L  Neurological: AAOx3; no focal deficits    MEDICATIONS:  MEDICATIONS  (STANDING):  buPROPion XL . 300 milliGRAM(s) Oral daily  chlorhexidine 2% Cloths 1 Application(s) Topical <User Schedule>  enoxaparin Injectable 40 milliGRAM(s) SubCutaneous every 24 hours  ferrous    sulfate 325 milliGRAM(s) Oral two times a day  gabapentin 300 milliGRAM(s) Oral two times a day  ivabradine 5 milliGRAM(s) Oral two times a day  lactated ringers. 1000 milliLiter(s) (200 mL/Hr) IV Continuous <Continuous>  pyridostigmine 60 milliGRAM(s) Oral daily  thyroid 15 milliGRAM(s) Oral <User Schedule>    MEDICATIONS  (PRN):  acetaminophen   Tablet .. 650 milliGRAM(s) Oral every 6 hours PRN Mild Pain (1 - 3)  ibuprofen  Tablet. 400 milliGRAM(s) Oral every 6 hours PRN Moderate Pain (4 - 6)      ALLERGIES:  Allergies    No Known Allergies    Intolerances        Pertinent LABS, RADIOLOGY, MICROBIOLOGY, and ADDITIONAL TESTS reviewed:                         9.9    4.80  )-----------( 197      ( 2019 06:18 )             32.8     -    142  |  107  |  7   ----------------------------<  85  3.7   |  24  |  0.67    Ca    9.0      2019 06:18  Phos  3.6     -  Mg     1.4         TPro  6.8  /  Alb  3.7  /  TBili  0.4  /  DBili  x   /  AST  24  /  ALT  19  /  AlkPhos  44  -28    PT/INR - ( 2019 06:56 )   PT: 13.4 sec;   INR: 1.18          PTT - ( 2019 06:56 )  PTT:32.4 sec  Urinalysis Basic - ( 2019 12:16 )    Color: Yellow / Appearance: Clear / S.010 / pH: x  Gluc: x / Ketone: NEGATIVE  / Bili: Negative / Urobili: 0.2 E.U./dL   Blood: x / Protein: NEGATIVE mg/dL / Nitrite: NEGATIVE   Leuk Esterase: NEGATIVE / RBC: x / WBC x   Sq Epi: x / Non Sq Epi: x / Bacteria: x      CAPILLARY BLOOD GLUCOSE

## 2019-06-30 LAB
ANION GAP SERPL CALC-SCNC: 12 MMOL/L — SIGNIFICANT CHANGE UP (ref 5–17)
BUN SERPL-MCNC: 8 MG/DL — SIGNIFICANT CHANGE UP (ref 7–23)
CALCIUM SERPL-MCNC: 8.9 MG/DL — SIGNIFICANT CHANGE UP (ref 8.4–10.5)
CHLORIDE SERPL-SCNC: 106 MMOL/L — SIGNIFICANT CHANGE UP (ref 96–108)
CO2 SERPL-SCNC: 23 MMOL/L — SIGNIFICANT CHANGE UP (ref 22–31)
CREAT SERPL-MCNC: 0.65 MG/DL — SIGNIFICANT CHANGE UP (ref 0.5–1.3)
GLUCOSE SERPL-MCNC: 95 MG/DL — SIGNIFICANT CHANGE UP (ref 70–99)
HCT VFR BLD CALC: 33.2 % — LOW (ref 34.5–45)
HGB BLD-MCNC: 10.2 G/DL — LOW (ref 11.5–15.5)
MAGNESIUM SERPL-MCNC: 1.9 MG/DL — SIGNIFICANT CHANGE UP (ref 1.6–2.6)
MCHC RBC-ENTMCNC: 24 PG — LOW (ref 27–34)
MCHC RBC-ENTMCNC: 30.7 GM/DL — LOW (ref 32–36)
MCV RBC AUTO: 78.1 FL — LOW (ref 80–100)
NRBC # BLD: 0 /100 WBCS — SIGNIFICANT CHANGE UP (ref 0–0)
PLATELET # BLD AUTO: 203 K/UL — SIGNIFICANT CHANGE UP (ref 150–400)
POTASSIUM SERPL-MCNC: 3.7 MMOL/L — SIGNIFICANT CHANGE UP (ref 3.5–5.3)
POTASSIUM SERPL-SCNC: 3.7 MMOL/L — SIGNIFICANT CHANGE UP (ref 3.5–5.3)
RBC # BLD: 4.25 M/UL — SIGNIFICANT CHANGE UP (ref 3.8–5.2)
RBC # FLD: 15.3 % — HIGH (ref 10.3–14.5)
SODIUM SERPL-SCNC: 141 MMOL/L — SIGNIFICANT CHANGE UP (ref 135–145)
WBC # BLD: 5.38 K/UL — SIGNIFICANT CHANGE UP (ref 3.8–10.5)
WBC # FLD AUTO: 5.38 K/UL — SIGNIFICANT CHANGE UP (ref 3.8–10.5)

## 2019-06-30 PROCEDURE — 99233 SBSQ HOSP IP/OBS HIGH 50: CPT

## 2019-06-30 RX ADMIN — SODIUM CHLORIDE 200 MILLILITER(S): 9 INJECTION, SOLUTION INTRAVENOUS at 06:12

## 2019-06-30 RX ADMIN — GABAPENTIN 300 MILLIGRAM(S): 400 CAPSULE ORAL at 18:01

## 2019-06-30 RX ADMIN — Medication 15 MILLIGRAM(S): at 06:12

## 2019-06-30 RX ADMIN — Medication 400 MILLIGRAM(S): at 20:52

## 2019-06-30 RX ADMIN — GABAPENTIN 300 MILLIGRAM(S): 400 CAPSULE ORAL at 06:12

## 2019-06-30 RX ADMIN — BUPROPION HYDROCHLORIDE 300 MILLIGRAM(S): 150 TABLET, EXTENDED RELEASE ORAL at 12:54

## 2019-06-30 RX ADMIN — PYRIDOSTIGMINE BROMIDE 60 MILLIGRAM(S): 60 SOLUTION ORAL at 12:54

## 2019-06-30 RX ADMIN — CHLORHEXIDINE GLUCONATE 1 APPLICATION(S): 213 SOLUTION TOPICAL at 06:12

## 2019-06-30 RX ADMIN — SODIUM CHLORIDE 200 MILLILITER(S): 9 INJECTION, SOLUTION INTRAVENOUS at 10:59

## 2019-06-30 RX ADMIN — IVABRADINE 5 MILLIGRAM(S): 7.5 TABLET, FILM COATED ORAL at 06:12

## 2019-06-30 RX ADMIN — Medication 400 MILLIGRAM(S): at 21:52

## 2019-06-30 RX ADMIN — SODIUM CHLORIDE 200 MILLILITER(S): 9 INJECTION, SOLUTION INTRAVENOUS at 17:21

## 2019-06-30 RX ADMIN — SODIUM CHLORIDE 200 MILLILITER(S): 9 INJECTION, SOLUTION INTRAVENOUS at 20:58

## 2019-06-30 RX ADMIN — Medication 325 MILLIGRAM(S): at 06:12

## 2019-06-30 NOTE — PROGRESS NOTE ADULT - SUBJECTIVE AND OBJECTIVE BOX
Pt reports feeling her strength today is much improved.  However feels dizzy which makes reading difficult, describes it as her chronic dizziness, feeling off balance, no vertigo.    Today pt wishes to get IVIG however requesting to discuss first with her Neurologist in [place].    PT recommended JOEL    MR C, T, L spine prelim, as per my read, unremarkable aside from some mild cervical disc bulges.  Cord normal.

## 2019-06-30 NOTE — PROGRESS NOTE ADULT - ASSESSMENT
conversion d overlying SFN, autonomic dysfunction   -F/U RUQ US  -F/U spinal imaging final reads  -no additional neurologic workup recommended at this time  -will re-eval whether to start IVIG tomorrow, although given prior poor tolerance to IVIG suspect pt will have difficulty with it again  -PT

## 2019-07-01 DIAGNOSIS — D50.9 IRON DEFICIENCY ANEMIA, UNSPECIFIED: ICD-10-CM

## 2019-07-01 LAB
ANION GAP SERPL CALC-SCNC: 8 MMOL/L — SIGNIFICANT CHANGE UP (ref 5–17)
BUN SERPL-MCNC: 8 MG/DL — SIGNIFICANT CHANGE UP (ref 7–23)
CALCIUM SERPL-MCNC: 9.1 MG/DL — SIGNIFICANT CHANGE UP (ref 8.4–10.5)
CHLORIDE SERPL-SCNC: 106 MMOL/L — SIGNIFICANT CHANGE UP (ref 96–108)
CO2 SERPL-SCNC: 25 MMOL/L — SIGNIFICANT CHANGE UP (ref 22–31)
CREAT SERPL-MCNC: 0.67 MG/DL — SIGNIFICANT CHANGE UP (ref 0.5–1.3)
GLUCOSE SERPL-MCNC: 101 MG/DL — HIGH (ref 70–99)
HCT VFR BLD CALC: 34.1 % — LOW (ref 34.5–45)
HGB BLD-MCNC: 10.3 G/DL — LOW (ref 11.5–15.5)
MAGNESIUM SERPL-MCNC: 1.6 MG/DL — SIGNIFICANT CHANGE UP (ref 1.6–2.6)
MCHC RBC-ENTMCNC: 23.9 PG — LOW (ref 27–34)
MCHC RBC-ENTMCNC: 30.2 GM/DL — LOW (ref 32–36)
MCV RBC AUTO: 79.1 FL — LOW (ref 80–100)
NRBC # BLD: 0 /100 WBCS — SIGNIFICANT CHANGE UP (ref 0–0)
PLATELET # BLD AUTO: 189 K/UL — SIGNIFICANT CHANGE UP (ref 150–400)
POTASSIUM SERPL-MCNC: 3.9 MMOL/L — SIGNIFICANT CHANGE UP (ref 3.5–5.3)
POTASSIUM SERPL-SCNC: 3.9 MMOL/L — SIGNIFICANT CHANGE UP (ref 3.5–5.3)
RBC # BLD: 4.31 M/UL — SIGNIFICANT CHANGE UP (ref 3.8–5.2)
RBC # FLD: 15 % — HIGH (ref 10.3–14.5)
SODIUM SERPL-SCNC: 139 MMOL/L — SIGNIFICANT CHANGE UP (ref 135–145)
WBC # BLD: 4.94 K/UL — SIGNIFICANT CHANGE UP (ref 3.8–10.5)
WBC # FLD AUTO: 4.94 K/UL — SIGNIFICANT CHANGE UP (ref 3.8–10.5)

## 2019-07-01 PROCEDURE — 99233 SBSQ HOSP IP/OBS HIGH 50: CPT

## 2019-07-01 RX ORDER — MAGNESIUM OXIDE 400 MG ORAL TABLET 241.3 MG
400 TABLET ORAL DAILY
Refills: 0 | Status: DISCONTINUED | OUTPATIENT
Start: 2019-07-01 | End: 2019-07-02

## 2019-07-01 RX ORDER — DIPHENHYDRAMINE HCL 50 MG
25 CAPSULE ORAL EVERY 24 HOURS
Refills: 0 | Status: DISCONTINUED | OUTPATIENT
Start: 2019-07-01 | End: 2019-07-01

## 2019-07-01 RX ORDER — PYRIDOSTIGMINE BROMIDE 60 MG/5ML
60 SOLUTION ORAL THREE TIMES A DAY
Refills: 0 | Status: DISCONTINUED | OUTPATIENT
Start: 2019-07-01 | End: 2019-07-02

## 2019-07-01 RX ORDER — IMMUNE GLOBULIN (HUMAN) 10 G/100ML
20 INJECTION INTRAVENOUS; SUBCUTANEOUS EVERY 24 HOURS
Refills: 0 | Status: DISCONTINUED | OUTPATIENT
Start: 2019-07-01 | End: 2019-07-01

## 2019-07-01 RX ORDER — ACETAMINOPHEN 500 MG
650 TABLET ORAL EVERY 24 HOURS
Refills: 0 | Status: DISCONTINUED | OUTPATIENT
Start: 2019-07-01 | End: 2019-07-01

## 2019-07-01 RX ORDER — IRON SUCROSE 20 MG/ML
200 INJECTION, SOLUTION INTRAVENOUS ONCE
Refills: 0 | Status: COMPLETED | OUTPATIENT
Start: 2019-07-01 | End: 2019-07-01

## 2019-07-01 RX ADMIN — GABAPENTIN 300 MILLIGRAM(S): 400 CAPSULE ORAL at 17:44

## 2019-07-01 RX ADMIN — SODIUM CHLORIDE 200 MILLILITER(S): 9 INJECTION, SOLUTION INTRAVENOUS at 11:25

## 2019-07-01 RX ADMIN — MAGNESIUM OXIDE 400 MG ORAL TABLET 400 MILLIGRAM(S): 241.3 TABLET ORAL at 12:00

## 2019-07-01 RX ADMIN — CHLORHEXIDINE GLUCONATE 1 APPLICATION(S): 213 SOLUTION TOPICAL at 05:05

## 2019-07-01 RX ADMIN — Medication 15 MILLIGRAM(S): at 05:02

## 2019-07-01 RX ADMIN — PYRIDOSTIGMINE BROMIDE 60 MILLIGRAM(S): 60 SOLUTION ORAL at 21:46

## 2019-07-01 RX ADMIN — PYRIDOSTIGMINE BROMIDE 60 MILLIGRAM(S): 60 SOLUTION ORAL at 12:01

## 2019-07-01 RX ADMIN — BUPROPION HYDROCHLORIDE 300 MILLIGRAM(S): 150 TABLET, EXTENDED RELEASE ORAL at 12:00

## 2019-07-01 RX ADMIN — IRON SUCROSE 110 MILLIGRAM(S): 20 INJECTION, SOLUTION INTRAVENOUS at 16:59

## 2019-07-01 RX ADMIN — IVABRADINE 5 MILLIGRAM(S): 7.5 TABLET, FILM COATED ORAL at 05:02

## 2019-07-01 RX ADMIN — GABAPENTIN 300 MILLIGRAM(S): 400 CAPSULE ORAL at 05:01

## 2019-07-01 NOTE — PROGRESS NOTE ADULT - PROBLEM SELECTOR PLAN 2
Work-up at [facility] [place] in September 2018: EMG normal, skin biopsy showed small fiber axnonopathy  Has seen Dr. Berumen and Dr. Najjar as outpatient  Has had prior IVIg infusions, last time in September 2018 while at [facility] [place]. Was planning to start outpatient IVIg infusions, but delayed due to lack of insurance coverage.   -Increased gabapentin 300 mg BID   -c/w pyridostigmine 60 mg QD  -IVIG infusion not planned on this admission Work[place]up at [facility] [place] in September 2018: EMG normal, skin biopsy showed small fiber axnonopathy  Has seen Dr. Berumen and Dr. Najjar as outpatient  Has had prior IVIg infusions, last time in September 2018 while at [facility] [place]. Was planning to start outpatient IVIg infusions, but delayed due to lack of insurance coverage.   -Continue gabapentin 300 mg BID   -c/w pyridostigmine 60 mg QD  -IVIG infusion 20mg (0.4g/kg) -Day 1/5 (End 7/5)  -Will premedicate with acetaminophen 650mg and Benadryl 25mg

## 2019-07-01 NOTE — PROGRESS NOTE ADULT - SUBJECTIVE AND OBJECTIVE BOX
CC: IMMUNE NEUROPATHY      INTERVAL HISTORY:   history as outlined.  receiving iv fluids for orthostatic hypotension. abd sono neg.   neuro imaging being completed.      ROS: No chest pain. No shortness of breath. No nausea.    PAST MEDICAL & SURGICAL HISTORY:  Small fiber neuropathy  POTS (postural orthostatic tachycardia syndrome)  No significant past surgical history      PHYSICAL EXAM:  T(C): 36.9 (19 @ 20:55), Max: 36.9 (19 @ 20:55)  HR: 65 (19 @ 20:55)  BP: 149/87 (19 @ 20:55) (106/72 - 149/87)  RR: 18 (19 @ 20:55)  SpO2: 98% (19 @ 20:55)  Wt(kg): --  I&O's Summary      -  2019 07:00  --------------------------------------------------------  IN: 1600 mL / OUT: 2 mL / NET: 1598 mL    2019 07  -  2019 23:49  --------------------------------------------------------  IN: 1600 mL / OUT: 1800 mL / NET: -200 mL      Weight 51.6 ( @ 03:06)    Appearance: alert, pleasant, INAD.  ENT: oral mucosa moist, no pallor/cyanosis.  Neck: no JVD visible.  Cardiac: no rubs. no murmurs. Extremities: NO EDEMA.  Skin: no rashes.  Extremities (digits): no clubbing or cyanosis.  Respratory effort: no access muscle use. Lungs: CLEAR TO AUSCULTATION.  Abdomen: soft. nontender. no masses.  Psych affect: not depressed. Orientation: person, place, situation.     MEDICATIONS  (STANDING):  buPROPion XL . 300 milliGRAM(s) Oral daily  chlorhexidine 2% Cloths 1 Application(s) Topical <User Schedule>  enoxaparin Injectable 40 milliGRAM(s) SubCutaneous every 24 hours  ferrous    sulfate 325 milliGRAM(s) Oral two times a day  gabapentin 300 milliGRAM(s) Oral two times a day  ivabradine 5 milliGRAM(s) Oral two times a day  lactated ringers. 1000 milliLiter(s) (200 mL/Hr) IV Continuous <Continuous>  magnesium oxide 400 milliGRAM(s) Oral daily  pyridostigmine 60 milliGRAM(s) Oral three times a day  thyroid 15 milliGRAM(s) Oral <User Schedule>    MEDICATIONS  (PRN):  acetaminophen   Tablet .. 650 milliGRAM(s) Oral every 6 hours PRN Mild Pain (1 - 3)  ibuprofen  Tablet. 400 milliGRAM(s) Oral every 6 hours PRN Moderate Pain (4 - 6)      DATA:  139    |  106    |  8      ----------------------------<  101<H>  Ca:9.1   (2019 07:41)  3.9     |  25     |  0.67       eGFR if Non : 106  eGFR if : 123                            10.3<L>  4.94  )-----------( 189      ( 2019 07:41 )             34.1<L>    Ferritin, Serum: 12 ng/mL <L> ( @ 06:18)      Urinalysis Basic - ( 2019 12:16 )  Color: Yellow / Appearance: Clear / S.010 / pH: x  Gluc: x / Ketone: NEGATIVE  / Bili: Negative / Urobili: 0.2 E.U./dL   Blood: x / Protein: NEGATIVE mg/dL / Nitrite: NEGATIVE   Leuk Esterase: NEGATIVE / RBC: x / WBC x   Sq Epi: x / Non Sq Epi: x / Bacteria: x

## 2019-07-01 NOTE — PROGRESS NOTE ADULT - PROBLEM SELECTOR PLAN 3
Diagnosed in September 2018 after autonomic testing showed dysfunction of sympathetic sudomotor fibers and orthostatic intolerance  Patient infusions 2 L of Lactated Ringer's daily via PICC line at home   Orthostatics - negative on admission  -c/w ivabradine 5 mg QD (not on formulary, pt can take home supply)  -c/w Bupropion 300 mg QD  -daily IVF infusions Diagnosed in September 2018 after autonomic testing showed dysfunction of sympathetic sudomotor fibers and orthostatic intolerance  Patient infusions 2 L of Lactated Ringer's daily via PICC line at home   Orthostatics - negative on admission  -c/w ivabradine 5 mg QD (not on formulary, pt can take home supply)  -c/w Bupropion 300 mg QD

## 2019-07-01 NOTE — PROGRESS NOTE ADULT - ASSESSMENT
44 year old female with POT syndrome, small fiber neuropathy, Hashimoto's thyroiditis who presents as transfer from outside hospital after episode of right-sided weakness with near syncope concern for worsening neurologic condition vs stress related cause. 44 year old female with POT syndrome, small fiber neuropathy, Hashimoto's thyroiditis who presents as transfer from outside hospital after episode of right-sided weakness with near syncope concern for worsening neurologic condition

## 2019-07-01 NOTE — PROGRESS NOTE ADULT - SUBJECTIVE AND OBJECTIVE BOX
OVERNIGHT EVENTS:    SUBJECTIVE / INTERVAL HPI: No acute events overnight reported by patient or staff. Patient seen and examined at bedside.     VITAL SIGNS:  Vital Signs Last 24 Hrs  T(C): 36.6 (2019 05:15), Max: 37.1 (2019 15:44)  T(F): 97.8 (2019 05:15), Max: 98.8 (2019 15:44)  HR: 69 (2019 05:15) (62 - 70)  BP: 120/77 (2019 05:15) (107/69 - 126/82)  BP(mean): --  RR: 17 (2019 05:15) (16 - 17)  SpO2: 98% (2019 05:15) (98% - 99%)      19 @ 07:01  -  19 @ 07:00  --------------------------------------------------------  IN: 0 mL / OUT: 2600 mL / NET: -2600 mL    19 @ 07:01  -  19 @ 06:49  --------------------------------------------------------  IN: 1600 mL / OUT: 2 mL / NET: 1598 mL        PHYSICAL EXAM:    General: WDWN  HEENT: NC/AT; PERRL, anicteric sclera; MMM  Neck: supple  Cardiovascular: +S1/S2, RRR  Respiratory: CTA B/L; no W/R/R  Gastrointestinal: soft, NT/ND; +BSx4  Extremities: WWP; no edema, clubbing or cyanosis  Vascular: 2+ radial, DP/PT pulses B/L  Neurological: AAOx3; no focal deficits    MEDICATIONS:  MEDICATIONS  (STANDING):  buPROPion XL . 300 milliGRAM(s) Oral daily  chlorhexidine 2% Cloths 1 Application(s) Topical <User Schedule>  enoxaparin Injectable 40 milliGRAM(s) SubCutaneous every 24 hours  ferrous    sulfate 325 milliGRAM(s) Oral two times a day  gabapentin 300 milliGRAM(s) Oral two times a day  ivabradine 5 milliGRAM(s) Oral two times a day  lactated ringers. 1000 milliLiter(s) (200 mL/Hr) IV Continuous <Continuous>  pyridostigmine 60 milliGRAM(s) Oral daily  thyroid 15 milliGRAM(s) Oral <User Schedule>    MEDICATIONS  (PRN):  acetaminophen   Tablet .. 650 milliGRAM(s) Oral every 6 hours PRN Mild Pain (1 - 3)  ibuprofen  Tablet. 400 milliGRAM(s) Oral every 6 hours PRN Moderate Pain (4 - 6)      ALLERGIES:  Allergies    No Known Allergies    Intolerances        Pertinent LABS, RADIOLOGY, MICROBIOLOGY, and ADDITIONAL TESTS reviewed:                         10.2   5.38  )-----------( 203      ( 2019 06:08 )             33.2     06-30    141  |  106  |  8   ----------------------------<  95  3.7   |  23  |  0.65    Ca    8.9      2019 06:08  Mg     1.9     06-30        Urinalysis Basic - ( 2019 12:16 )    Color: Yellow / Appearance: Clear / S.010 / pH: x  Gluc: x / Ketone: NEGATIVE  / Bili: Negative / Urobili: 0.2 E.U./dL   Blood: x / Protein: NEGATIVE mg/dL / Nitrite: NEGATIVE   Leuk Esterase: NEGATIVE / RBC: x / WBC x   Sq Epi: x / Non Sq Epi: x / Bacteria: x      CAPILLARY BLOOD GLUCOSE OVERNIGHT EVENTS:    SUBJECTIVE / INTERVAL HPI: No acute events overnight reported by patient or staff. Patient seen and examined at bedside. She said she is feeling like she is back to baseline. Strength is normal, and has some dizziness but reports it's normal. Reports constipation and that she takes MagOx at home.    VITAL SIGNS:  Vital Signs Last 24 Hrs  T(C): 36.6 (2019 05:15), Max: 37.1 (2019 15:44)  T(F): 97.8 (2019 05:15), Max: 98.8 (2019 15:44)  HR: 69 (2019 05:15) (62 - 70)  BP: 120/77 (2019 05:15) (107/69 - 126/82)  BP(mean): --  RR: 17 (2019 05:15) (16 - 17)  SpO2: 98% (2019 05:15) (98% - 99%)      19 @ 07:01  -  19 @ 07:00  --------------------------------------------------------  IN: 0 mL / OUT: 2600 mL / NET: -2600 mL    19 @ 07:01  -  19 @ 06:49  --------------------------------------------------------  IN: 1600 mL / OUT: 2 mL / NET: 1598 mL        PHYSICAL EXAM:    General: WDWN  HEENT: NC/AT; PERRL, anicteric sclera; MMM  Neck: supple  Cardiovascular: +S1/S2, RRR  Respiratory: CTA B/L; no W/R/R  Gastrointestinal: soft, NT/ND; +BSx4  Extremities: WWP; no edema, clubbing or cyanosis  Vascular: 2+ radial, DP/PT pulses B/L    Neurologic:  -Mental status: Awake, alert, oriented to person, place, and time. Speech is fluent with no dysarthria. Recent memory intact. Follows commands. Fund of knowledge appropriate.  -Cranial nerves:   II: Visual fields are full to confrontation.  III, IV, VI: Extraocular movements are intact without nystagmus. Pupils equally round and reactive to light  V:  Facial sensation V1-V3 decreased on right side  VII: Face is symmetric with normal eye closure and smile  VIII: Hearing is bilaterally intact to finger rub  IX, X: Uvula is midline and soft palate rises symmetrically  XI: Head turning and shoulder shrug are intact.  XII: Tongue protrudes midline  Motor: Normal bulk and tone. Strength bilateral upper extremity 5/5,LLE 5/5. RHF 4/5 the rest 5/5  Sensation: Decreased to light touch on right side.  Reflexes: Downgoing toes bilaterally, no clonus, 2+ patellar reflexes bilaterally      MEDICATIONS:  MEDICATIONS  (STANDING):  buPROPion XL . 300 milliGRAM(s) Oral daily  chlorhexidine 2% Cloths 1 Application(s) Topical <User Schedule>  enoxaparin Injectable 40 milliGRAM(s) SubCutaneous every 24 hours  ferrous    sulfate 325 milliGRAM(s) Oral two times a day  gabapentin 300 milliGRAM(s) Oral two times a day  ivabradine 5 milliGRAM(s) Oral two times a day  lactated ringers. 1000 milliLiter(s) (200 mL/Hr) IV Continuous <Continuous>  pyridostigmine 60 milliGRAM(s) Oral daily  thyroid 15 milliGRAM(s) Oral <User Schedule>    MEDICATIONS  (PRN):  acetaminophen   Tablet .. 650 milliGRAM(s) Oral every 6 hours PRN Mild Pain (1 - 3)  ibuprofen  Tablet. 400 milliGRAM(s) Oral every 6 hours PRN Moderate Pain (4 - 6)      ALLERGIES:  Allergies    No Known Allergies    Intolerances        Pertinent LABS, RADIOLOGY, MICROBIOLOGY, and ADDITIONAL TESTS reviewed:                         10.2   5.38  )-----------( 203      ( 2019 06:08 )             33.2     06-30    141  |  106  |  8   ----------------------------<  95  3.7   |  23  |  0.65    Ca    8.9      2019 06:08  Mg     1.9     -30        Urinalysis Basic - ( 2019 12:16 )    Color: Yellow / Appearance: Clear / S.010 / pH: x  Gluc: x / Ketone: NEGATIVE  / Bili: Negative / Urobili: 0.2 E.U./dL   Blood: x / Protein: NEGATIVE mg/dL / Nitrite: NEGATIVE   Leuk Esterase: NEGATIVE / RBC: x / WBC x   Sq Epi: x / Non Sq Epi: x / Bacteria: x      CAPILLARY BLOOD GLUCOSE    MR Lumbar Impression:  Multilevel disc degeneration, greatest at L4-5 as above. Left asymmetric   facet osteoarthritis/synovitis at this level as well.     MR Thoracic Impression:  IMPRESSION:     Limited study. No obvious thoracic spinal cord signal abnormality. Please   note this study cannot assess for any subtle demyelinating or inflammatory   lesion. Consider repeat imaging if warranted.     No large thoracic spinal disc herniation, spinal stenosis or neural   foraminal narrowing.     MR Cervical Spine:  No pathologic signal is identified in the cervical spinal cord given mild   motion limitations of this study.     Multilevel disc degeneration of the cervical spine with mild multilevel   spinal stenosis and right asymmetric neural foraminal narrowing as detailed   above.   RUQ U/S Impression:  Unremarkable examination. OVERNIGHT EVENTS:    SUBJECTIVE / INTERVAL HPI: No acute events overnight reported by patient or staff. Patient seen and examined at bedside. She said she is feeling like she is back to baseline. Strength is normal, and has some dizziness but reports it's normal. Reports constipation and that she takes MagOx at home.    VITAL SIGNS:  Vital Signs Last 24 Hrs  T(C): 36.6 (2019 05:15), Max: 37.1 (2019 15:44)  T(F): 97.8 (2019 05:15), Max: 98.8 (2019 15:44)  HR: 69 (2019 05:15) (62 - 70)  BP: 120/77 (2019 05:15) (107/69 - 126/82)  BP(mean): --  RR: 17 (2019 05:15) (16 - 17)  SpO2: 98% (2019 05:15) (98% - 99%)      19 @ 07:01  -  19 @ 07:00  --------------------------------------------------------  IN: 0 mL / OUT: 2600 mL / NET: -2600 mL    19 @ 07:01  -  19 @ 06:49  --------------------------------------------------------  IN: 1600 mL / OUT: 2 mL / NET: 1598 mL        PHYSICAL EXAM:    General: WDWN  HEENT: NC/AT; PERRL, anicteric sclera; MMM  Neck: supple  Cardiovascular: +S1/S2, RRR  Respiratory: CTA B/L; no W/R/R  Gastrointestinal: soft, Mild epigastric and RUQ pain on palpation, +BSx4  Extremities: WWP; no edema, clubbing or cyanosis, Line present on LUE  Vascular: 2+ radial, DP/PT pulses B/L    Neurologic:  -Mental status: Awake, alert, oriented to person, place, and time. Speech is fluent with no dysarthria. Recent memory intact. Follows commands. Fund of knowledge appropriate.  -Cranial nerves:   II: Visual fields are full to confrontation.  III, IV, VI: Extraocular movements are intact without nystagmus. Pupils equally round and reactive to light  V:  Facial sensation V1-V3 decreased on right side  VII: Face is symmetric with normal eye closure and smile  VIII: Hearing is bilaterally intact to finger rub  IX, X: Uvula is midline and soft palate rises symmetrically  XI: Head turning and shoulder shrug are intact.  XII: Tongue protrudes midline  Motor: Normal bulk and tone. Strength bilateral upper extremity 5/5,LLE 5/5. RHF 4/5 the rest 5/5  Sensation: Decreased to light touch on right side.  Reflexes: Downgoing toes bilaterally, no clonus, 2+ patellar reflexes bilaterally      MEDICATIONS:  MEDICATIONS  (STANDING):  buPROPion XL . 300 milliGRAM(s) Oral daily  chlorhexidine 2% Cloths 1 Application(s) Topical <User Schedule>  enoxaparin Injectable 40 milliGRAM(s) SubCutaneous every 24 hours  ferrous    sulfate 325 milliGRAM(s) Oral two times a day  gabapentin 300 milliGRAM(s) Oral two times a day  ivabradine 5 milliGRAM(s) Oral two times a day  lactated ringers. 1000 milliLiter(s) (200 mL/Hr) IV Continuous <Continuous>  pyridostigmine 60 milliGRAM(s) Oral daily  thyroid 15 milliGRAM(s) Oral <User Schedule>    MEDICATIONS  (PRN):  acetaminophen   Tablet .. 650 milliGRAM(s) Oral every 6 hours PRN Mild Pain (1 - 3)  ibuprofen  Tablet. 400 milliGRAM(s) Oral every 6 hours PRN Moderate Pain (4 - 6)      ALLERGIES:  Allergies    No Known Allergies    Intolerances        Pertinent LABS, RADIOLOGY, MICROBIOLOGY, and ADDITIONAL TESTS reviewed:                         10.2   5.38  )-----------( 203      ( 2019 06:08 )             33.2     06-30    141  |  106  |  8   ----------------------------<  95  3.7   |  23  |  0.65    Ca    8.9      2019 06:08  Mg     1.9             Urinalysis Basic - ( 2019 12:16 )    Color: Yellow / Appearance: Clear / S.010 / pH: x  Gluc: x / Ketone: NEGATIVE  / Bili: Negative / Urobili: 0.2 E.U./dL   Blood: x / Protein: NEGATIVE mg/dL / Nitrite: NEGATIVE   Leuk Esterase: NEGATIVE / RBC: x / WBC x   Sq Epi: x / Non Sq Epi: x / Bacteria: x      CAPILLARY BLOOD GLUCOSE    MR Lumbar Impression:  Multilevel disc degeneration, greatest at L4-5 as above. Left asymmetric   facet osteoarthritis/synovitis at this level as well.     MR Thoracic Impression:  IMPRESSION:     Limited study. No obvious thoracic spinal cord signal abnormality. Please   note this study cannot assess for any subtle demyelinating or inflammatory   lesion. Consider repeat imaging if warranted.     No large thoracic spinal disc herniation, spinal stenosis or neural   foraminal narrowing.     MR Cervical Spine:  No pathologic signal is identified in the cervical spinal cord given mild   motion limitations of this study.     Multilevel disc degeneration of the cervical spine with mild multilevel   spinal stenosis and right asymmetric neural foraminal narrowing as detailed   above.   RUQ U/S Impression:  Unremarkable examination. OVERNIGHT EVENTS:  SUBJECTIVE / INTERVAL HPI: No acute events overnight reported by patient or staff. Patient seen and examined at bedside. She said she is feeling like she is back to baseline. Strength is normal, and has some dizziness but reports it's normal. Reports constipation and that she takes MagOx at home.    VITAL SIGNS:  Vital Signs Last 24 Hrs  T(C): 36.6 (2019 05:15), Max: 37.1 (2019 15:44)  T(F): 97.8 (2019 05:15), Max: 98.8 (2019 15:44)  HR: 69 (2019 05:15) (62 - 70)  BP: 120/77 (2019 05:15) (107/69 - 126/82)  BP(mean): --  RR: 17 (2019 05:15) (16 - 17)  SpO2: 98% (2019 05:15) (98% - 99%)      19 @ 07:01  -  19 @ 07:00  --------------------------------------------------------  IN: 0 mL / OUT: 2600 mL / NET: -2600 mL    19 @ 07:01  -  19 @ 06:49  --------------------------------------------------------  IN: 1600 mL / OUT: 2 mL / NET: 1598 mL        PHYSICAL EXAM:    General: WDWN  HEENT: NC/AT; PERRL, anicteric sclera; MMM  Neck: supple  Cardiovascular: +S1/S2, RRR  Respiratory: CTA B/L; no W/R/R  Gastrointestinal: soft, Mild epigastric and RUQ pain on palpation, +BSx4  Extremities: WWP; no edema, clubbing or cyanosis, Line present on LUE  Vascular: 2+ radial, DP/PT pulses B/L    Neurologic:  -Mental status: Awake, alert, oriented to person, place, and time. Speech is fluent with no dysarthria. Recent memory intact. Follows commands. Fund of knowledge appropriate.  -Cranial nerves:   II: Visual fields are full to confrontation.  III, IV, VI: Extraocular movements are intact without nystagmus. Pupils equally round and reactive to light  V:  Facial sensation V1-V3 decreased on right side  VII: Face is symmetric with normal eye closure and smile  VIII: Hearing is bilaterally intact to finger rub  IX, X: Uvula is midline and soft palate rises symmetrically  XI: Head turning and shoulder shrug are intact.  XII: Tongue protrudes midline  Motor: Normal bulk and tone. Strength bilateral upper extremity 5/5,LLE 5/5. RHF 4/5 the rest 5/5  Sensation: Decreased to light touch on right side.  Reflexes: Downgoing toes bilaterally, no clonus, 2+ patellar reflexes bilaterally      MEDICATIONS:  MEDICATIONS  (STANDING):  buPROPion XL . 300 milliGRAM(s) Oral daily  chlorhexidine 2% Cloths 1 Application(s) Topical <User Schedule>  enoxaparin Injectable 40 milliGRAM(s) SubCutaneous every 24 hours  ferrous    sulfate 325 milliGRAM(s) Oral two times a day  gabapentin 300 milliGRAM(s) Oral two times a day  ivabradine 5 milliGRAM(s) Oral two times a day  lactated ringers. 1000 milliLiter(s) (200 mL/Hr) IV Continuous <Continuous>  pyridostigmine 60 milliGRAM(s) Oral daily  thyroid 15 milliGRAM(s) Oral <User Schedule>    MEDICATIONS  (PRN):  acetaminophen   Tablet .. 650 milliGRAM(s) Oral every 6 hours PRN Mild Pain (1 - 3)  ibuprofen  Tablet. 400 milliGRAM(s) Oral every 6 hours PRN Moderate Pain (4 - 6)      ALLERGIES:  Allergies    No Known Allergies    Intolerances        Pertinent LABS, RADIOLOGY, MICROBIOLOGY, and ADDITIONAL TESTS reviewed:                         10.2   5.38  )-----------( 203      ( 2019 06:08 )             33.2     06-30    141  |  106  |  8   ----------------------------<  95  3.7   |  23  |  0.65    Ca    8.9      2019 06:08  Mg     1.9             Urinalysis Basic - ( 2019 12:16 )    Color: Yellow / Appearance: Clear / S.010 / pH: x  Gluc: x / Ketone: NEGATIVE  / Bili: Negative / Urobili: 0.2 E.U./dL   Blood: x / Protein: NEGATIVE mg/dL / Nitrite: NEGATIVE   Leuk Esterase: NEGATIVE / RBC: x / WBC x   Sq Epi: x / Non Sq Epi: x / Bacteria: x      CAPILLARY BLOOD GLUCOSE    MR Lumbar Impression:  Multilevel disc degeneration, greatest at L4-5 as above. Left asymmetric   facet osteoarthritis/synovitis at this level as well.     MR Thoracic Impression:  IMPRESSION:     Limited study. No obvious thoracic spinal cord signal abnormality. Please   note this study cannot assess for any subtle demyelinating or inflammatory   lesion. Consider repeat imaging if warranted.     No large thoracic spinal disc herniation, spinal stenosis or neural   foraminal narrowing.     MR Cervical Spine:  No pathologic signal is identified in the cervical spinal cord given mild   motion limitations of this study.     Multilevel disc degeneration of the cervical spine with mild multilevel   spinal stenosis and right asymmetric neural foraminal narrowing as detailed   above.   RUQ U/S Impression:  Unremarkable examination.

## 2019-07-01 NOTE — PROGRESS NOTE ADULT - PROBLEM SELECTOR PROBLEM 3
Hashimoto's thyroiditis
POTS (postural orthostatic tachycardia syndrome)
POTS (postural orthostatic tachycardia syndrome)

## 2019-07-01 NOTE — PROGRESS NOTE ADULT - ATTENDING COMMENTS
Discussed case with Dr. Aguilar and Dr. Orta (from [facility] in [place])   Pt agreed to trial of IVig, however there is a hospital shortage currently  Will treat iron deficiency anemia  Increase mestinon to 60mg TID  f/u with Dr. Najjar, Dr. Orta after discharge

## 2019-07-01 NOTE — PROGRESS NOTE ADULT - ASSESSMENT
have discussed course and rx with dr jovel.   had hoped to start ivig despite lack of assurance that it would make definitive difference, but pharmacy cannot guarantee supply of agent through course, and pt correctly concerned that even an improvement would be difficult to replicate as outpt given her current insurance issues.  she relates intension to change coverage after summer, and in interim will attempt to optimize h/h, and replete fe with parenteral fe. discussed with team and dir jovel.

## 2019-07-01 NOTE — PROGRESS NOTE ADULT - PROBLEM SELECTOR PLAN 5
Patient reports she has been getting this worked up as an outpatient.  -Start Fe 325mg BID Patient with Ferratin of 12  -200 mg Iron Sucrose given

## 2019-07-01 NOTE — PROGRESS NOTE ADULT - PROBLEM SELECTOR PLAN 1
Patient with frequent near syncopal episodes with associated right-sided weakness. Also with reported RLE weakness in the past 2 months. Likely due to combination of underlying POTS and small fiber neuropathy.   CT head with no evidence of infarct, intracranial hemorrhage. EKG showing NSR  Orthostatics - negative on admission  -Will give IV fluid hydration w/ LR (Patient infusions 2 L of Lactated Ringer's daily via PICC line at home)   -Management of small fiber neuropathy and POTS as below Patient with frequent near syncopal episodes with associated right-sided weakness. Also with reported RLE weakness in the past 2 months. Likely due to combination of underlying POTS and small fiber neuropathy.   CT head with no evidence of infarct, intracranial hemorrhage. EKG showing NSR  Orthostatics - negative on admission  -Will give IV fluid hydration w/ LR (Patient infusions 2 L of Lactated Ringer's daily via PICC line at home)   -Management of small fiber neuropathy and POTS as below  -MRI cervical, thoracic, and lumbar spine completed 6/29 without impression as above      #####  Abdominal Pain  -Patient reported >1 week RUQ pain  -RUQ U/S showed no pathologic findings

## 2019-07-02 ENCOUNTER — TRANSCRIPTION ENCOUNTER (OUTPATIENT)
Age: 44
End: 2019-07-02

## 2019-07-02 VITALS
OXYGEN SATURATION: 97 % | RESPIRATION RATE: 18 BRPM | SYSTOLIC BLOOD PRESSURE: 106 MMHG | TEMPERATURE: 98 F | HEART RATE: 72 BPM | DIASTOLIC BLOOD PRESSURE: 71 MMHG

## 2019-07-02 LAB
ANION GAP SERPL CALC-SCNC: 10 MMOL/L — SIGNIFICANT CHANGE UP (ref 5–17)
BASOPHILS # BLD AUTO: 0.04 K/UL — SIGNIFICANT CHANGE UP (ref 0–0.2)
BASOPHILS NFR BLD AUTO: 0.7 % — SIGNIFICANT CHANGE UP (ref 0–2)
BUN SERPL-MCNC: 9 MG/DL — SIGNIFICANT CHANGE UP (ref 7–23)
CALCIUM SERPL-MCNC: 9.2 MG/DL — SIGNIFICANT CHANGE UP (ref 8.4–10.5)
CHLORIDE SERPL-SCNC: 105 MMOL/L — SIGNIFICANT CHANGE UP (ref 96–108)
CO2 SERPL-SCNC: 26 MMOL/L — SIGNIFICANT CHANGE UP (ref 22–31)
CREAT SERPL-MCNC: 0.69 MG/DL — SIGNIFICANT CHANGE UP (ref 0.5–1.3)
EOSINOPHIL # BLD AUTO: 0.14 K/UL — SIGNIFICANT CHANGE UP (ref 0–0.5)
EOSINOPHIL NFR BLD AUTO: 2.6 % — SIGNIFICANT CHANGE UP (ref 0–6)
GLUCOSE SERPL-MCNC: 95 MG/DL — SIGNIFICANT CHANGE UP (ref 70–99)
HCT VFR BLD CALC: 34.2 % — LOW (ref 34.5–45)
HGB BLD-MCNC: 10.4 G/DL — LOW (ref 11.5–15.5)
IMM GRANULOCYTES NFR BLD AUTO: 0.2 % — SIGNIFICANT CHANGE UP (ref 0–1.5)
LYMPHOCYTES # BLD AUTO: 1.71 K/UL — SIGNIFICANT CHANGE UP (ref 1–3.3)
LYMPHOCYTES # BLD AUTO: 31.4 % — SIGNIFICANT CHANGE UP (ref 13–44)
MCHC RBC-ENTMCNC: 24 PG — LOW (ref 27–34)
MCHC RBC-ENTMCNC: 30.4 GM/DL — LOW (ref 32–36)
MCV RBC AUTO: 78.8 FL — LOW (ref 80–100)
MONOCYTES # BLD AUTO: 0.67 K/UL — SIGNIFICANT CHANGE UP (ref 0–0.9)
MONOCYTES NFR BLD AUTO: 12.3 % — SIGNIFICANT CHANGE UP (ref 2–14)
NEUTROPHILS # BLD AUTO: 2.88 K/UL — SIGNIFICANT CHANGE UP (ref 1.8–7.4)
NEUTROPHILS NFR BLD AUTO: 52.8 % — SIGNIFICANT CHANGE UP (ref 43–77)
NRBC # BLD: 0 /100 WBCS — SIGNIFICANT CHANGE UP (ref 0–0)
PLATELET # BLD AUTO: 203 K/UL — SIGNIFICANT CHANGE UP (ref 150–400)
POTASSIUM SERPL-MCNC: 3.8 MMOL/L — SIGNIFICANT CHANGE UP (ref 3.5–5.3)
POTASSIUM SERPL-SCNC: 3.8 MMOL/L — SIGNIFICANT CHANGE UP (ref 3.5–5.3)
RBC # BLD: 4.34 M/UL — SIGNIFICANT CHANGE UP (ref 3.8–5.2)
RBC # FLD: 14.9 % — HIGH (ref 10.3–14.5)
SODIUM SERPL-SCNC: 141 MMOL/L — SIGNIFICANT CHANGE UP (ref 135–145)
WBC # BLD: 5.45 K/UL — SIGNIFICANT CHANGE UP (ref 3.8–10.5)
WBC # FLD AUTO: 5.45 K/UL — SIGNIFICANT CHANGE UP (ref 3.8–10.5)

## 2019-07-02 PROCEDURE — 99232 SBSQ HOSP IP/OBS MODERATE 35: CPT

## 2019-07-02 PROCEDURE — 99239 HOSP IP/OBS DSCHRG MGMT >30: CPT

## 2019-07-02 RX ORDER — GABAPENTIN 400 MG/1
1 CAPSULE ORAL
Qty: 0 | Refills: 0 | DISCHARGE
Start: 2019-07-02

## 2019-07-02 RX ORDER — PYRIDOSTIGMINE BROMIDE 60 MG/5ML
1 SOLUTION ORAL
Qty: 0 | Refills: 0 | DISCHARGE
Start: 2019-07-02

## 2019-07-02 RX ORDER — MECLIZINE HCL 12.5 MG
25 TABLET ORAL DAILY
Refills: 0 | Status: DISCONTINUED | OUTPATIENT
Start: 2019-07-02 | End: 2019-07-02

## 2019-07-02 RX ORDER — GABAPENTIN 400 MG/1
1 CAPSULE ORAL
Qty: 0 | Refills: 0 | DISCHARGE

## 2019-07-02 RX ORDER — IRON SUCROSE 20 MG/ML
200 INJECTION, SOLUTION INTRAVENOUS ONCE
Refills: 0 | Status: COMPLETED | OUTPATIENT
Start: 2019-07-02 | End: 2019-07-02

## 2019-07-02 RX ORDER — MECLIZINE HCL 12.5 MG
1 TABLET ORAL
Qty: 30 | Refills: 0
Start: 2019-07-02 | End: 2019-07-31

## 2019-07-02 RX ORDER — POTASSIUM CHLORIDE 20 MEQ
20 PACKET (EA) ORAL ONCE
Refills: 0 | Status: COMPLETED | OUTPATIENT
Start: 2019-07-02 | End: 2019-07-02

## 2019-07-02 RX ADMIN — Medication 400 MILLIGRAM(S): at 07:07

## 2019-07-02 RX ADMIN — Medication 20 MILLIEQUIVALENT(S): at 09:49

## 2019-07-02 RX ADMIN — GABAPENTIN 300 MILLIGRAM(S): 400 CAPSULE ORAL at 06:07

## 2019-07-02 RX ADMIN — PYRIDOSTIGMINE BROMIDE 60 MILLIGRAM(S): 60 SOLUTION ORAL at 06:07

## 2019-07-02 RX ADMIN — PYRIDOSTIGMINE BROMIDE 60 MILLIGRAM(S): 60 SOLUTION ORAL at 19:22

## 2019-07-02 RX ADMIN — BUPROPION HYDROCHLORIDE 300 MILLIGRAM(S): 150 TABLET, EXTENDED RELEASE ORAL at 12:45

## 2019-07-02 RX ADMIN — Medication 15 MILLIGRAM(S): at 06:08

## 2019-07-02 RX ADMIN — IVABRADINE 5 MILLIGRAM(S): 7.5 TABLET, FILM COATED ORAL at 06:08

## 2019-07-02 RX ADMIN — Medication 400 MILLIGRAM(S): at 06:07

## 2019-07-02 RX ADMIN — CHLORHEXIDINE GLUCONATE 1 APPLICATION(S): 213 SOLUTION TOPICAL at 06:07

## 2019-07-02 RX ADMIN — IRON SUCROSE 110 MILLIGRAM(S): 20 INJECTION, SOLUTION INTRAVENOUS at 14:55

## 2019-07-02 RX ADMIN — GABAPENTIN 300 MILLIGRAM(S): 400 CAPSULE ORAL at 19:22

## 2019-07-02 RX ADMIN — SODIUM CHLORIDE 200 MILLILITER(S): 9 INJECTION, SOLUTION INTRAVENOUS at 09:28

## 2019-07-02 RX ADMIN — MAGNESIUM OXIDE 400 MG ORAL TABLET 400 MILLIGRAM(S): 241.3 TABLET ORAL at 12:45

## 2019-07-02 NOTE — DIETITIAN INITIAL EVALUATION ADULT. - PROBLEM SELECTOR PLAN 1
Patient with frequent near syncopal episodes with associated right-sided weakness. Also with reported RLE weakness in the past 2 months. Likely due to combination of underlying POTS and small fiber neuropathy.   CT head with no evidence of infarct, intracranial hemorrhage. EKG showing NSR  Orthostatics - negative on admission  -Will give IV fluid hydration w/ LR (Patient infusions 2 L of Lactated Ringer's daily via PICC line at home)   -Management of small fiber neuropathy and POTS as below

## 2019-07-02 NOTE — DIETITIAN INITIAL EVALUATION ADULT. - OTHER INFO
44F with POTS, small fiber neuropathy, Hashimoto's thyroiditis who presents as transfer from outside hospital after episode of right-sided weakness with near syncope while patient was seeking her PCP. States she often expereinces episode where it feel like "her body goes limp," but they used to only last for minutes. They gradually got worse lasting for hours and nearly a day. She was initially transferred with the plan of giving the patient IVIG, but not candidate. W/u w MRI Cervical, thoracic, and lumbar spine was ordered and found no acute findings to explain her current symptoms. Throughout the hospitalization she experienced episodes of muscle cramps, but overall improved each day additionally, found to have iron deficiency anemia, refused oral iron at the time, so she received IV doses before discharge. Now expressed that she felt close to her baseline, plan for DC to [facility] vs home. Denied n/v/d, reports constipation, denies pain, chewing/ swallowing issues impacting intake, skin is intact. Wt stable, NKFA, tolerating diet without complaint. Will continue to follow per prtocol.

## 2019-07-02 NOTE — PROGRESS NOTE ADULT - REASON FOR ADMISSION
weakness and near syncope

## 2019-07-02 NOTE — DISCHARGE NOTE NURSING/CASE MANAGEMENT/SOCIAL WORK - NSDCFUADDAPPT_GEN_ALL_CORE_FT
Please follow up at your appointment with Dr. Ferrer on July 16, 2019 at 9am at the [facility].    Please contact the office for Dr. Berumen to arrange an outpatient follow up appointment.

## 2019-07-02 NOTE — PROGRESS NOTE ADULT - ASSESSMENT
Neuropathy. Low BP improved.    Suggest:    1. Check orthostatics.  2. IVIg if available.    Please call with any questions.    Tej Mack MD, FACP, FASN | kidney.[facility]  Nephrology, Hypertension, and Internal Medicine  Mobile: [phone] (Daytime Hours Only)  Office/Answering Service: [phone]  Asst. Prof. of Medicine, [employer] [facility] [employer] at [facility] - Nephrology at [address]  [address], [address], [place], [place]

## 2019-07-02 NOTE — DIETITIAN INITIAL EVALUATION ADULT. - ENERGY NEEDS
ABW used for calculations as pt between % of IBW.   ABW 51.6kg, IBW 45kg, 113% IBW, ht 48", BMI 23   Nutrient needs based on [facility] standards of care for maintenance in adults.

## 2019-07-02 NOTE — PROGRESS NOTE ADULT - SUBJECTIVE AND OBJECTIVE BOX
CC: IMMUNE NEUROPATHY    INTERVAL HISTORY:    * Persistent neuropathy symptoms. * BP controlled.     ROS: No chest pain. No shortness of breath. No nausea.    PAST MEDICAL & SURGICAL HISTORY:  Small fiber neuropathy  POTS (postural orthostatic tachycardia syndrome)  No significant past surgical history    PHYSICAL EXAM:  T(C): 36.6 (2019 16:10), Max: 36.9 (2019 20:55)  HR: 72 (2019 16:10)  BP: 106/71 (2019 16:10) (105/69 - 149/87)  RR: 18 (2019 16:10)  SpO2: 97% (2019 16:10)      2019 07:01  -  2019 07:00  --------------------------------------------------------  IN:    lactated ringers.: 1600 mL  Total IN: 1600 mL    OUT:    Voided: 1801 mL  Total OUT: 1801 mL    Total NET: -201 mL        Weight 51.6 (2019 03:06)    Appearance: alert, pleasant, INAD.  ENT: oral mucosa moist, no pallor/cyanosis.  Neck: no JVD visible.  Cardiac: no rubs. no murmurs. Extremities: NO EDEMA.  Skin: no rashes.  Extremities (digits): no clubbing or cyanosis.  Respratory effort: no access muscle use. Lungs: CLEAR TO AUSCULTATION.  Abdomen: soft. nontender. no masses.  Psych affect: not depressed. Orientation: person, place, situation.     MEDICATIONS  (STANDING):  buPROPion XL . 300 milliGRAM(s) Oral daily  chlorhexidine 2% Cloths 1 Application(s) Topical <User Schedule>  enoxaparin Injectable 40 milliGRAM(s) SubCutaneous every 24 hours  ferrous    sulfate 325 milliGRAM(s) Oral two times a day  gabapentin 300 milliGRAM(s) Oral two times a day  ivabradine 5 milliGRAM(s) Oral two times a day  lactated ringers. 1000 milliLiter(s) (200 mL/Hr) IV Continuous <Continuous>  magnesium oxide 400 milliGRAM(s) Oral daily  pyridostigmine 60 milliGRAM(s) Oral three times a day  thyroid 15 milliGRAM(s) Oral <User Schedule>    MEDICATIONS  (PRN):  acetaminophen   Tablet .. 650 milliGRAM(s) Oral every 6 hours PRN Mild Pain (1 - 3)  ibuprofen  Tablet. 400 milliGRAM(s) Oral every 6 hours PRN Moderate Pain (4 - 6)  meclizine 25 milliGRAM(s) Oral daily PRN Dizziness    DATA:  141    |  105    |  9      ----------------------------<  95     Ca:9.2   (2019 06:13)  3.8     |  26     |  0.69       eGFR if Non : 105  eGFR if : 122        SCr 0.69 [2019 06:13]  SCr 0.67 [2019 07:41]  SCr 0.65 [2019 06:08]  SCr 0.67 [2019 06:18]  SCr 0.73 [2019 06:56]                          10.4<L>  5.45  )-----------( 203      ( 2019 06:13 )             34.2<L>    Phos:-- M.7 mg/dL PTH:-- Uric acid:-- Serum Osm:--  Ferritin:-- Iron:-- TIBC:-- Tsat:--  B12:-- TSH:-- (2019 06:13)    Urinalysis Basic - ( 2019 12:16 )  Color: Yellow / Appearance: Clear / S.010 / pH: x  Gluc: x / Ketone: NEGATIVE  / Bili: Negative / Urobili: 0.2 E.U./dL   Blood: x / Protein: NEGATIVE mg/dL / Nitrite: NEGATIVE   Leuk Esterase: NEGATIVE / RBC: x / WBC x   Sq Epi: x / Non Sq Epi: x / Bacteria: x

## 2019-07-02 NOTE — DIETITIAN INITIAL EVALUATION ADULT. - PROBLEM SELECTOR PLAN 2
[place] [facility] [place]: EMG normal, skin biopsy showed small fiber axnonopathy  Has seen Dr. Berumen and Dr. Najjar as outpatient  Has had prior IVIg infusions, last time in September 2018 while [place] [facility]. Was planning to start outpatient IVIg infusions, but delayed due to lack of insurance coverage.   -c/w gabapentin 300 mg QD   -c/w pyridostigmine 60 mg QD  -consider  IVIG infusion on this admission

## 2019-07-02 NOTE — DISCHARGE NOTE NURSING/CASE MANAGEMENT/SOCIAL WORK - NSDCDPATPORTLINK_GEN_ALL_CORE
You can access the [facility] Patient Portal, offered by [facility], by registering with the following website: [email or web address]

## 2019-07-03 ENCOUNTER — INBOUND DOCUMENT (OUTPATIENT)
Age: 44
End: 2019-07-03

## 2019-07-06 DIAGNOSIS — E06.3 AUTOIMMUNE THYROIDITIS: ICD-10-CM

## 2019-07-06 DIAGNOSIS — G90.09 OTHER IDIOPATHIC PERIPHERAL AUTONOMIC NEUROPATHY: ICD-10-CM

## 2019-07-06 DIAGNOSIS — R55 SYNCOPE AND COLLAPSE: ICD-10-CM

## 2019-07-06 DIAGNOSIS — R10.11 RIGHT UPPER QUADRANT PAIN: ICD-10-CM

## 2019-07-06 DIAGNOSIS — F44.4 CONVERSION DISORDER WITH MOTOR SYMPTOM OR DEFICIT: ICD-10-CM

## 2019-07-06 DIAGNOSIS — R20.0 ANESTHESIA OF SKIN: ICD-10-CM

## 2019-07-06 DIAGNOSIS — I49.8 OTHER SPECIFIED CARDIAC ARRHYTHMIAS: ICD-10-CM

## 2019-07-06 DIAGNOSIS — D50.9 IRON DEFICIENCY ANEMIA, UNSPECIFIED: ICD-10-CM

## 2019-07-10 PROCEDURE — 70450 CT HEAD/BRAIN W/O DYE: CPT

## 2019-07-10 PROCEDURE — G0378: CPT

## 2019-07-10 PROCEDURE — 70498 CT ANGIOGRAPHY NECK: CPT

## 2019-07-10 PROCEDURE — 80048 BASIC METABOLIC PNL TOTAL CA: CPT

## 2019-07-10 PROCEDURE — 36415 COLL VENOUS BLD VENIPUNCTURE: CPT

## 2019-07-10 PROCEDURE — 99285 EMERGENCY DEPT VISIT HI MDM: CPT | Mod: 25

## 2019-07-10 PROCEDURE — 83735 ASSAY OF MAGNESIUM: CPT

## 2019-07-10 PROCEDURE — 93005 ELECTROCARDIOGRAM TRACING: CPT

## 2019-07-10 PROCEDURE — 81003 URINALYSIS AUTO W/O SCOPE: CPT

## 2019-07-10 PROCEDURE — 0042T: CPT

## 2019-07-10 PROCEDURE — 84443 ASSAY THYROID STIM HORMONE: CPT

## 2019-07-10 PROCEDURE — 70496 CT ANGIOGRAPHY HEAD: CPT

## 2019-07-10 PROCEDURE — 80307 DRUG TEST PRSMV CHEM ANLYZR: CPT

## 2019-07-10 PROCEDURE — 85027 COMPLETE CBC AUTOMATED: CPT

## 2019-08-14 ENCOUNTER — CLINICAL ADVICE (OUTPATIENT)
Age: 44
End: 2019-08-14

## 2019-09-23 NOTE — ED ADULT NURSE NOTE - NSIMPLEMENTINTERV_GEN_ALL_ED
SUBJECTIVE:     Lolly Grigsby is a 15 month old female, here for a routine health maintenance visit.    Patient was roomed by: Jasmine Koehler CMA    Well Child     Social History  Patient accompanied by:  Mother and maternal grandmother  Questions or concerns?: YES    Forms to complete? No  Child lives with::  Mother and father  Who takes care of your child?:  Father, maternal grandfather, maternal grandmother and mother  Languages spoken in the home:  English  Recent family changes/ special stressors?:  None noted    Safety / Health Risk  Is your child around anyone who smokes?  No    TB Exposure:     No TB exposure    Car seat < 6 years old, in  back seat, rear-facing, 5-point restraint? Yes    Home Safety Survey:      Stairs Gated?:  NO     Wood stove / Fireplace screened?  Yes     Poisons / cleaning supplies out of reach?:  Yes     Swimming pool?:  No     Firearms in the home?: YES          Are trigger locks present?  Yes        Is ammunition stored separately? Yes    Hearing / Vision  Hearing or vision concerns?  No concerns, hearing and vision subjectively normal    Daily Activities  Nutrition:  Good appetite, eats variety of foods, cows milk, bottle and cup  Vitamins & Supplements:  No    Sleep      Sleep arrangement:crib    Sleep pattern: sleeps through the night    Elimination       Urinary frequency:4-6 times per 24 hours     Stool frequency: 4-6 times per 24 hours     Stool consistency: soft     Elimination problems:  None    Dental    Water source:  Well water    Dental provider: patient does not have a dental home    Risks: a parent has had a cavity in past 3 years      Dental visit recommended: Yes  Deferred to 18 months    DEVELOPMENT  Screening tool used, reviewed with parent/guardian:   ASQ 16 M Communication Gross Motor Fine Motor Problem Solving Personal-social   Score 25 45 45 35 40   Cutoff 16.81 37.91 31.98 30.51 26.43   Result MONITOR MONITOR PASSED MONITOR PASSED         PROBLEM  "LIST  Patient Active Problem List   Diagnosis     Infantile eczema     MEDICATIONS  Current Outpatient Medications   Medication Sig Dispense Refill     BUTT PASTE - REGULAR (DR LOVE POOP GOOP BUTT PASTE FORMULA) Apply topically Diaper Change for skin protection 30 g 1      ALLERGY  No Known Allergies    IMMUNIZATIONS  Immunization History   Administered Date(s) Administered     DTAP-IPV/HIB (PENTACEL) 2018, 2018, 2018     Hep B, Peds or Adolescent 2018, 2018, 2018     HepA-ped 2 Dose 06/27/2019     Influenza Vaccine IM Ages 6-35 Months 4 Valent (PF) 2018, 01/28/2019     MMR 06/27/2019     Pneumo Conj 13-V (2010&after) 2018, 2018, 2018     Rotavirus, monovalent, 2-dose 2018, 2018     Varicella 06/27/2019       HEALTH HISTORY SINCE LAST VISIT  No surgery, major illness or injury since last physical exam    ROS  Constitutional, eye, ENT, skin, respiratory, cardiac, and GI are normal except as otherwise noted.    OBJECTIVE:   EXAM  Pulse 126   Temp 98.3  F (36.8  C) (Temporal)   Resp 24   Ht 2' 6.22\" (0.768 m)   Wt 20 lb 10 oz (9.355 kg)   HC 17.84\" (45.3 cm)   BMI 15.88 kg/m    39 %ile based on WHO (Girls, 0-2 years) head circumference-for-age based on Head Circumference recorded on 9/23/2019.  41 %ile based on WHO (Girls, 0-2 years) weight-for-age data based on Weight recorded on 9/23/2019.  38 %ile based on WHO (Girls, 0-2 years) Length-for-age data based on Length recorded on 9/23/2019.  45 %ile based on WHO (Girls, 0-2 years) weight-for-recumbent length based on body measurements available as of 9/23/2019.  GENERAL: Alert, well appearing, no distress  SKIN: Clear. No significant rash, abnormal pigmentation or lesions  HEAD: Normocephalic.  EYES:  Symmetric light reflex and no eye movement on cover/uncover test. Normal conjunctivae.  EARS: Normal canals. Tympanic membranes are normal; gray and translucent.  NOSE: Normal without " discharge.  MOUTH/THROAT: Clear. No oral lesions. Teeth without obvious abnormalities.  NECK: Supple, no masses.  No thyromegaly.  LYMPH NODES: No adenopathy  LUNGS: Clear. No rales, rhonchi, wheezing or retractions  HEART: Regular rhythm. Normal S1/S2. No murmurs. Normal pulses.  ABDOMEN: Soft, non-tender, not distended, no masses or hepatosplenomegaly. Bowel sounds normal.   GENITALIA: Normal female external genitalia. Sukhjinder stage I,  No inguinal herniae are present.  EXTREMITIES: Full range of motion, no deformities  NEUROLOGIC: No focal findings. Cranial nerves grossly intact: DTR's normal. Normal gait, strength and tone    ASSESSMENT/PLAN:   1. Encounter for routine child health examination w/o abnormal findings  Healthy toddler with normal growth and development  - INFLUENZA VACCINE IM > 6 MONTHS VALENT IIV4 [35828]  - DTAP IMMUNIZATION (<7Y), IM [92055]  - HIB VACCINE, PRP-T, IM [[id number]]  - PNEUMOCOCCAL CONJ VACCINE 13 VALENT IM [[id number]]  - DEVELOPMENTAL TEST, LOW    2. Infantile eczema  Well-controlled with home cares      Anticipatory Guidance  The following topics were discussed:  SOCIAL/ FAMILY:    Enforce a few rules consistently    Stranger/ separation anxiety    Reading to child    Book given from Reach Out & Read program    Hitting/ biting/ aggressive behavior    Tantrums  NUTRITION:    Healthy food choices    Iron, calcium sources    Age-related decrease in appetite  HEALTH/ SAFETY:    Dental hygiene    Sleep issues    Never leave unattended    Exploration/ climbing    Preventive Care Plan  Immunizations     See orders in EpicCare.  I reviewed the signs and symptoms of adverse effects and when to seek medical care if they should arise.  Referrals/Ongoing Specialty care: No   See other orders in EpicCare    Resources:  [place] Child and Teen Checkups (C&TC) Schedule of Age-Related Screening Standards    FOLLOW-UP:      18 month Preventive Care visit    Jasmine Talamantes MD  [facility]  [place]   Implemented All Universal Safety Interventions:  [place] to call system. Call bell, personal items and telephone within reach. Instruct patient to call for assistance. Room bathroom lighting operational. Non-slip footwear when patient is off stretcher. Physically safe environment: no spills, clutter or unnecessary equipment. Stretcher in lowest position, wheels locked, appropriate side rails in place.

## 2019-10-07 ENCOUNTER — CLINICAL ADVICE (OUTPATIENT)
Age: 44
End: 2019-10-07

## 2019-10-31 PROCEDURE — 72141 MRI NECK SPINE W/O DYE: CPT

## 2019-10-31 PROCEDURE — 85730 THROMBOPLASTIN TIME PARTIAL: CPT

## 2019-10-31 PROCEDURE — 80048 BASIC METABOLIC PNL TOTAL CA: CPT

## 2019-10-31 PROCEDURE — 84466 ASSAY OF TRANSFERRIN: CPT

## 2019-10-31 PROCEDURE — 36415 COLL VENOUS BLD VENIPUNCTURE: CPT

## 2019-10-31 PROCEDURE — 72148 MRI LUMBAR SPINE W/O DYE: CPT

## 2019-10-31 PROCEDURE — 83735 ASSAY OF MAGNESIUM: CPT

## 2019-10-31 PROCEDURE — 76700 US EXAM ABDOM COMPLETE: CPT

## 2019-10-31 PROCEDURE — 97116 GAIT TRAINING THERAPY: CPT

## 2019-10-31 PROCEDURE — 85610 PROTHROMBIN TIME: CPT

## 2019-10-31 PROCEDURE — 84100 ASSAY OF PHOSPHORUS: CPT

## 2019-10-31 PROCEDURE — 82728 ASSAY OF FERRITIN: CPT

## 2019-10-31 PROCEDURE — 85025 COMPLETE CBC W/AUTO DIFF WBC: CPT

## 2019-10-31 PROCEDURE — 85027 COMPLETE CBC AUTOMATED: CPT

## 2019-10-31 PROCEDURE — 85045 AUTOMATED RETICULOCYTE COUNT: CPT

## 2019-10-31 PROCEDURE — 84443 ASSAY THYROID STIM HORMONE: CPT

## 2019-10-31 PROCEDURE — 72146 MRI CHEST SPINE W/O DYE: CPT

## 2019-10-31 PROCEDURE — 83550 IRON BINDING TEST: CPT

## 2019-10-31 PROCEDURE — 80053 COMPREHEN METABOLIC PANEL: CPT

## 2019-10-31 PROCEDURE — 71045 X-RAY EXAM CHEST 1 VIEW: CPT

## 2019-10-31 PROCEDURE — 81003 URINALYSIS AUTO W/O SCOPE: CPT

## 2019-10-31 PROCEDURE — 97161 PT EVAL LOW COMPLEX 20 MIN: CPT

## 2019-10-31 PROCEDURE — 97110 THERAPEUTIC EXERCISES: CPT

## 2019-10-31 PROCEDURE — 97530 THERAPEUTIC ACTIVITIES: CPT

## 2019-10-31 PROCEDURE — 83540 ASSAY OF IRON: CPT

## 2019-11-18 ENCOUNTER — APPOINTMENT (OUTPATIENT)
Dept: NEUROLOGY | Facility: CLINIC | Age: 44
End: 2019-11-18
Payer: MEDICARE

## 2019-11-18 VITALS
HEIGHT: 59 IN | BODY MASS INDEX: 22.18 KG/M2 | HEART RATE: 62 BPM | DIASTOLIC BLOOD PRESSURE: 83 MMHG | SYSTOLIC BLOOD PRESSURE: 130 MMHG | OXYGEN SATURATION: 100 % | TEMPERATURE: 98.5 F | WEIGHT: 110 LBS

## 2019-11-18 PROCEDURE — 99214 OFFICE O/P EST MOD 30 MIN: CPT

## 2020-07-22 ENCOUNTER — APPOINTMENT (OUTPATIENT)
Dept: NEUROLOGY | Facility: CLINIC | Age: 45
End: 2020-07-22
Payer: MEDICARE

## 2020-07-22 PROCEDURE — 99443: CPT | Mod: 95

## 2020-07-22 NOTE — PHYSICAL EXAM
[General Appearance - In No Acute Distress] : in no acute distress [General Appearance - Alert] : alert [Oriented To Time, Place, And Person] : oriented to person, place, and time [Person] : oriented to person [Place] : oriented to place [Motor Strength] : muscle strength was normal in all four extremities [Time] : oriented to time [Extraocular Movements] : extraocular movements were intact [FreeTextEntry1] : PATIENT GETTING ACTIVE IVF INFUSION TO LEFT ARM PICC LINE FOR POTS. EXAM LIMITED TO LUE FOR THIS REASON.  [Plantar Reflex Left Only] : normal on the left [Plantar Reflex Right Only] : normal on the right [___] : absent on the right [___] : absent on the left

## 2020-07-22 NOTE — DISCUSSION/SUMMARY
[FreeTextEntry1] : Patient with small fiber neuropathy diagnosed via skin bx. With symptoms of autoimmune inflammation in  the form of cognitive and systemic manifestations including generalized pain, tremor and muscle fatigue in a setting of POTS (pt receives daily IVF), TPO: 117 (0-35) and MANDEEP: 1: 320. \par \par Stable exam. Given all of patients  symptoms are autoimmune related with abnormal immune markers, is reasonable to give a trial of IVIG. \par \par 1. Will start auth for IVIG 0.5g/kg every 2 weeks for 3 months. Infuse over 7 hours, Tylenol/Benadryl. No additional fluids, patient gets 2L a day for POTS. \par 2. Exercise, nutrition and adequate sleep.\par 3. RTC in 3 months

## 2020-07-22 NOTE — HISTORY OF PRESENT ILLNESS
[FreeTextEntry1] : Ms. RINALDI presents today for a follow up visit of small fiber neuropathy diagnosed via skin bx. With symptoms of autoimmune inflammation in  the form of cognitive and systemic manifestations including generalized pain, tremor and muscle fatigue in a setting of POTS (pt receives daily IVF), TPO: 117 (0-35) and MANDEEP: 1: 320. \par \par Testing review: full autoimmune serology and neuropathy panel both of which were unremarkable. Furthermore, REEG and AEEG were normal as well. She had Neuro psych testing which as per the report, did not indicate the presence of neurologically based cognitive disorder and current symptoms were likely related to ongoing medical issues. \par \par Addison was previously seen and treated with IVIG at [facility] in 09/2018. Per patient she received 35g for 3 days. During her course patient has side effect of headache., fever and neck pain. She denies any significant changes in symptoms. \par \par At this time patient continues to report overwhelming fatigue, generalized weakness with muscle aches and fatigue. She reports she has no life quality due to symptoms,. She denies progression at this time.

## 2020-07-23 NOTE — REASON FOR VISIT
[Other Location: e.g. School (Enter Location, City,State)___] : at [unfilled], at the time of the visit. [Verbal consent obtained from patient] : the patient, [unfilled]

## 2020-07-23 NOTE — HISTORY OF PRESENT ILLNESS
[FreeTextEntry1] : Ms. RINALDI presents today for a follow up visit of small fiber neuropathy diagnosed via skin bx. With symptoms of autoimmune inflammation in  the form of cognitive and systemic manifestations including generalized pain, tremor and muscle fatigue in a setting of POTS (pt receives daily IVF), TPO: 117 (0-35) and MANDEEP: 1: 320. \par \par Testing review: full autoimmune serology and neuropathy panel both of which were unremarkable. Furthermore, REEG and AEEG were normal as well. She had Neuro psych testing which as per the report, did not indicate the presence of neurologically based cognitive disorder and current symptoms were likely related to ongoing medical issues. \par \par Patient was previously seen and treated with IVIG at [facility] in 09/2018. Per patient she received 35g for 3 days. During her course patient has side effect of headache., fever and neck pain. She denies any significant changes in symptoms. \par \par Patient reports she was COVID + in March, recovered at home with O2. For the past 7 weeks patient has been in a rehab facility. Patient reports significant difficulty ambulating. Reporting her legs can freeze for hours. Patient gives example that she can be laying in bed an her legs will freeze up at a 90 degree angle and stay like this for hours. Does not feel PT is helping. Planning discharge this week. Will go home with PT, Ins and assistive devices. \par \par Further symptoms patient reports includes: muscle locking, swallowing issues, SOB, CP and unintentional weight loss. Had not seen PCP for wt loss. \par \par She denies any new neurological complaints at this time. IVIG has been denies since ordered at last visit. Alonso reports she now has a new [employer] company and requesting new auth.

## 2020-07-23 NOTE — DISCUSSION/SUMMARY
[FreeTextEntry1] : Patient with small fiber neuropathy diagnosed via skin bx. With symptoms of autoimmune inflammation in  the form of cognitive and systemic manifestations including generalized pain, tremor and muscle fatigue in a setting of POTS (pt receives daily IVF), TPO: 117 (0-35) and MANDEEP: 1: 320. \par \par Has been admitted to rehab for the last 7 weeks for muscle weakness and locking affecting her ambulation. Planning discharge his week. Requesting new auth for IVIG. At this time we recommend the following: \par \par 1. recommend patient see PCP ASAP for unintential weight loss, r/o paraneoplastic process\par 2. F/U GI for GI complaints\par 3. Continie PT\par 4. Serology as ordered\par \par All questions and concerns were addressed. Emotional support was rendered.

## 2020-07-23 NOTE — HISTORY OF PRESENT ILLNESS
[FreeTextEntry1] : Ms. RINALDI presents today for a follow up visit of small fiber neuropathy diagnosed via skin bx. With symptoms of autoimmune inflammation in  the form of cognitive and systemic manifestations including generalized pain, tremor and muscle fatigue in a setting of POTS (pt receives daily IVF), TPO: 117 (0-35) and MANDEEP: 1: 320. \par \par Testing review: full autoimmune serology and neuropathy panel both of which were unremarkable. Furthermore, REEG and AEEG were normal as well. She had Neuro psych testing which as per the report, did not indicate the presence of neurologically based cognitive disorder and current symptoms were likely related to ongoing medical issues. \par \par Patient was previously seen and treated with IVIG at [facility] in 09/2018. Per patient she received 35g for 3 days. During her course patient has side effect of headache., fever and neck pain. She denies any significant changes in symptoms. \par \par Patient reports she was COVID + in March, recovered at home with O2. For the past 7 weeks patient has been in a rehab facility. Patient reports significant difficulty ambulating. Reporting her legs can freeze for hours. Patient gives example that she can be laying in bed an her legs will freeze up at a 90 degree angle and stay like this for hours. Does not feel PT is helping. Planning discharge this week. Will go home with PT, Ins and assistive devices. \par \par Further symptoms patient reports includes: muscle locking, swallowing issues, SOB, CP and unintentional weight loss. Had not seen PCP for wt loss. \par \par She denies any new neurological complaints at this time. IVIG has been denies since ordered at last visit. Alonso reports she now has a new [employer] company and requesting new auth.

## 2020-08-02 ENCOUNTER — FORM ENCOUNTER (OUTPATIENT)
Age: 45
End: 2020-08-02

## 2020-08-11 ENCOUNTER — FORM ENCOUNTER (OUTPATIENT)
Age: 45
End: 2020-08-11

## 2020-08-18 ENCOUNTER — APPOINTMENT (OUTPATIENT)
Dept: NEUROLOGY | Facility: CLINIC | Age: 45
End: 2020-08-18
Payer: MEDICARE

## 2020-08-19 ENCOUNTER — APPOINTMENT (OUTPATIENT)
Dept: NEUROLOGY | Facility: CLINIC | Age: 45
End: 2020-08-19
Payer: MEDICARE

## 2020-08-19 ENCOUNTER — LABORATORY RESULT (OUTPATIENT)
Age: 45
End: 2020-08-19

## 2020-08-19 VITALS
DIASTOLIC BLOOD PRESSURE: 87 MMHG | OXYGEN SATURATION: 100 % | HEART RATE: 63 BPM | BODY MASS INDEX: 19.15 KG/M2 | HEIGHT: 59 IN | SYSTOLIC BLOOD PRESSURE: 137 MMHG | TEMPERATURE: 96.7 F | WEIGHT: 95 LBS

## 2020-08-19 DIAGNOSIS — M35.9 SYSTEMIC INVOLVEMENT OF CONNECTIVE TISSUE, UNSPECIFIED: ICD-10-CM

## 2020-08-19 PROCEDURE — 99214 OFFICE O/P EST MOD 30 MIN: CPT

## 2020-08-19 NOTE — DISCUSSION/SUMMARY
[FreeTextEntry1] : Patient with small fiber neuropathy diagnosed via skin bx. 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 Symptoms characterized by cognitive and systemic manifestations including generalized pain, tremor and muscle fatigue and weakness.\par \par Continues to report symptoms as above. Has not had any relief from any medication. Feels IVIG did help in 2018 but she did have side effects of HA, fever and neck pain. Recently showing immune dysfunction with abnormal CH50 and TPO. \par \par At this time we recommend the following: \par 1. PCP to start Erythromycin 250mg TID to enhance GI motility and treat bacterial overgrowth related to GI dysmotility. \par 2. Serology as ordered. Will include ganglionic acetylcholine receptor ab as it can be detected in autoimmune dysautonomia causing POTS and GI dysmotility. \par 3. Proceed with IVIG 2g/kg divided over 5 days 12 hour infusions. Initial infusion to be done inpatient given she had side effects in the past. If she does well, remainder if infusions to be done in home. \par 4. Will discuss serology when resulted and RTC\par \par All questions and concerns were addressed. Emotional support was rendered.

## 2020-08-19 NOTE — HISTORY OF PRESENT ILLNESS
[FreeTextEntry1] : Ms. RINALDI presents today for a follow up visit of small fiber neuropathy diagnosed via skin bx. With symptoms of autoimmune inflammation in  the form of cognitive and systemic manifestations including generalized pain, tremor and muscle fatigue in a setting of POTS (pt receives daily IVF), TPO: 117 (0-35) and MANDEEP: 1: 320. \par \par Testing review: full autoimmune serology and neuropathy panel both of which were unremarkable. Furthermore, REEG and AEEG were normal as well. She had Neuro psych testing which as per the report, did not indicate the presence of neurologically based cognitive disorder and current symptoms were likely related to ongoing medical issues. \par \par [facility] Per patient she received 35g for 3 days. During her course patient has side effect of headache, fever and neck pain. She denies any significant changes in symptoms. \par _________________________________________________________________________________\par Patient reports she was COVID + in March, recovered at home with O2. For almost 8 weeks patient was admitted to a rehab facility. Patient reports significant difficulty ambulating. Reporting her legs can freeze for hours. Patient gives example that she can be laying in bed an her legs will freeze up at a 90 degree angle and stay like this for hours. Does not feel PT is helping. Discharged home with PT and assistive devices. \par \par Further symptoms patient reports includes: muscle locking, swallowing issues, SOB, CP and unintentional weight loss. Had not seen PCP for wt loss. Further reports GI symptoms of constipation and feeling full with small meal. She is being followed by GI but has no diagnoses. Has been on pyridostigmine for 3 years with no changes. MG antibodies were previously checked and unremarkable. \par \par IVIG has been denied since ordered at last visit. Patient reports she now has a new insurance company and requesting new auth. \par \par Labs done by PCP 8/12 as follows: Normal CRP, C3, C4, ESR, CK, ELISA. Abnormal: TPO: 186 (<35), CH50: 25 (31-60) and Vitamin D: 13.7.\par \par Of note, channing presented to office with ambulate in a stretcher. Continues to be at the rehab facility and is awaiting set up for home discharge with aid and home care. Planning to be D/C's on 9/1.

## 2020-08-19 NOTE — PHYSICAL EXAM
[General Appearance - Alert] : alert [Oriented To Time, Place, And Person] : oriented to person, place, and time [Person] : oriented to person [Place] : oriented to place [Time] : oriented to time [Extraocular Movements] : extraocular movements were intact

## 2020-08-20 LAB
DSDNA AB SER-ACNC: <12 IU/ML
ENA SS-A AB SER IA-ACNC: <0.2 AL
ENA SS-B AB SER IA-ACNC: <0.2 AL

## 2020-08-21 LAB
ALBUMIN MFR SERPL ELPH: 59.3 %
ALBUMIN SERPL-MCNC: 4 G/DL
ALBUMIN/GLOB SERPL: 1.4 RATIO
ALPHA1 GLOB MFR SERPL ELPH: 3.9 %
ALPHA1 GLOB SERPL ELPH-MCNC: 0.3 G/DL
ALPHA2 GLOB MFR SERPL ELPH: 8.8 %
ALPHA2 GLOB SERPL ELPH-MCNC: 0.6 G/DL
B-GLOBULIN MFR SERPL ELPH: 9.9 %
B-GLOBULIN SERPL ELPH-MCNC: 0.7 G/DL
GAMMA GLOB FLD ELPH-MCNC: 1.2 G/DL
GAMMA GLOB MFR SERPL ELPH: 18.1 %
INTERPRETATION SERPL IEP-IMP: NORMAL
M PROTEIN SPEC IFE-MCNC: NORMAL
PROT SERPL-MCNC: 6.8 G/DL
PROT SERPL-MCNC: 6.8 G/DL

## 2020-08-24 ENCOUNTER — TRANSCRIPTION ENCOUNTER (OUTPATIENT)
Age: 45
End: 2020-08-24

## 2020-08-24 LAB
ANA PAT FLD IF-IMP: ABNORMAL
ANA SER IF-ACNC: ABNORMAL

## 2020-09-01 ENCOUNTER — FORM ENCOUNTER (OUTPATIENT)
Age: 45
End: 2020-09-01

## 2020-09-02 ENCOUNTER — FORM ENCOUNTER (OUTPATIENT)
Age: 45
End: 2020-09-02

## 2020-09-20 ENCOUNTER — FORM ENCOUNTER (OUTPATIENT)
Age: 45
End: 2020-09-20

## 2020-09-22 ENCOUNTER — INPATIENT (INPATIENT)
Facility: HOSPITAL | Age: 45
LOS: 7 days | Discharge: NURSING, REHAB OR LONG-TERM CARE | DRG: 91 | End: 2020-09-30
Attending: PSYCHIATRY & NEUROLOGY | Admitting: PSYCHIATRY & NEUROLOGY
Payer: MEDICARE

## 2020-09-22 VITALS
OXYGEN SATURATION: 97 % | HEIGHT: 59 IN | RESPIRATION RATE: 16 BRPM | HEART RATE: 71 BPM | TEMPERATURE: 98 F | SYSTOLIC BLOOD PRESSURE: 113 MMHG | DIASTOLIC BLOOD PRESSURE: 72 MMHG | WEIGHT: 90.83 LBS

## 2020-09-22 DIAGNOSIS — Z29.9 ENCOUNTER FOR PROPHYLACTIC MEASURES, UNSPECIFIED: ICD-10-CM

## 2020-09-22 DIAGNOSIS — E06.3 AUTOIMMUNE THYROIDITIS: ICD-10-CM

## 2020-09-22 DIAGNOSIS — G90.1 FAMILIAL DYSAUTONOMIA [RILEY-DAY]: ICD-10-CM

## 2020-09-22 DIAGNOSIS — L03.90 CELLULITIS, UNSPECIFIED: ICD-10-CM

## 2020-09-22 DIAGNOSIS — G62.9 POLYNEUROPATHY, UNSPECIFIED: ICD-10-CM

## 2020-09-22 DIAGNOSIS — I49.8 OTHER SPECIFIED CARDIAC ARRHYTHMIAS: ICD-10-CM

## 2020-09-22 DIAGNOSIS — R07.9 CHEST PAIN, UNSPECIFIED: ICD-10-CM

## 2020-09-22 LAB
ALBUMIN SERPL ELPH-MCNC: 4.2 G/DL — SIGNIFICANT CHANGE UP (ref 3.3–5)
ALP SERPL-CCNC: 59 U/L — SIGNIFICANT CHANGE UP (ref 40–120)
ALT FLD-CCNC: 20 U/L — SIGNIFICANT CHANGE UP (ref 10–45)
ANION GAP SERPL CALC-SCNC: 11 MMOL/L — SIGNIFICANT CHANGE UP (ref 5–17)
AST SERPL-CCNC: 19 U/L — SIGNIFICANT CHANGE UP (ref 10–40)
BASOPHILS # BLD AUTO: 0.03 K/UL — SIGNIFICANT CHANGE UP (ref 0–0.2)
BASOPHILS NFR BLD AUTO: 0.5 % — SIGNIFICANT CHANGE UP (ref 0–2)
BILIRUB SERPL-MCNC: 0.4 MG/DL — SIGNIFICANT CHANGE UP (ref 0.2–1.2)
BUN SERPL-MCNC: 8 MG/DL — SIGNIFICANT CHANGE UP (ref 7–23)
CALCIUM SERPL-MCNC: 9.9 MG/DL — SIGNIFICANT CHANGE UP (ref 8.4–10.5)
CHLORIDE SERPL-SCNC: 104 MMOL/L — SIGNIFICANT CHANGE UP (ref 96–108)
CO2 SERPL-SCNC: 24 MMOL/L — SIGNIFICANT CHANGE UP (ref 22–31)
CREAT SERPL-MCNC: 0.62 MG/DL — SIGNIFICANT CHANGE UP (ref 0.5–1.3)
EOSINOPHIL # BLD AUTO: 0.04 K/UL — SIGNIFICANT CHANGE UP (ref 0–0.5)
EOSINOPHIL NFR BLD AUTO: 0.6 % — SIGNIFICANT CHANGE UP (ref 0–6)
GLUCOSE SERPL-MCNC: 86 MG/DL — SIGNIFICANT CHANGE UP (ref 70–99)
HCT VFR BLD CALC: 38.6 % — SIGNIFICANT CHANGE UP (ref 34.5–45)
HGB BLD-MCNC: 12.6 G/DL — SIGNIFICANT CHANGE UP (ref 11.5–15.5)
IMM GRANULOCYTES NFR BLD AUTO: 0.3 % — SIGNIFICANT CHANGE UP (ref 0–1.5)
LYMPHOCYTES # BLD AUTO: 2.05 K/UL — SIGNIFICANT CHANGE UP (ref 1–3.3)
LYMPHOCYTES # BLD AUTO: 32.8 % — SIGNIFICANT CHANGE UP (ref 13–44)
MAGNESIUM SERPL-MCNC: 2.3 MG/DL — SIGNIFICANT CHANGE UP (ref 1.6–2.6)
MCHC RBC-ENTMCNC: 26.6 PG — LOW (ref 27–34)
MCHC RBC-ENTMCNC: 32.6 GM/DL — SIGNIFICANT CHANGE UP (ref 32–36)
MCV RBC AUTO: 81.6 FL — SIGNIFICANT CHANGE UP (ref 80–100)
MONOCYTES # BLD AUTO: 0.54 K/UL — SIGNIFICANT CHANGE UP (ref 0–0.9)
MONOCYTES NFR BLD AUTO: 8.6 % — SIGNIFICANT CHANGE UP (ref 2–14)
NEUTROPHILS # BLD AUTO: 3.57 K/UL — SIGNIFICANT CHANGE UP (ref 1.8–7.4)
NEUTROPHILS NFR BLD AUTO: 57.2 % — SIGNIFICANT CHANGE UP (ref 43–77)
NRBC # BLD: 0 /100 WBCS — SIGNIFICANT CHANGE UP (ref 0–0)
PLATELET # BLD AUTO: 240 K/UL — SIGNIFICANT CHANGE UP (ref 150–400)
POTASSIUM SERPL-MCNC: 3.6 MMOL/L — SIGNIFICANT CHANGE UP (ref 3.5–5.3)
POTASSIUM SERPL-SCNC: 3.6 MMOL/L — SIGNIFICANT CHANGE UP (ref 3.5–5.3)
PROT SERPL-MCNC: 7.3 G/DL — SIGNIFICANT CHANGE UP (ref 6–8.3)
RBC # BLD: 4.73 M/UL — SIGNIFICANT CHANGE UP (ref 3.8–5.2)
RBC # FLD: 12.9 % — SIGNIFICANT CHANGE UP (ref 10.3–14.5)
SODIUM SERPL-SCNC: 139 MMOL/L — SIGNIFICANT CHANGE UP (ref 135–145)
TROPONIN T SERPL-MCNC: <0.01 NG/ML — SIGNIFICANT CHANGE UP (ref 0–0.01)
WBC # BLD: 6.25 K/UL — SIGNIFICANT CHANGE UP (ref 3.8–10.5)
WBC # FLD AUTO: 6.25 K/UL — SIGNIFICANT CHANGE UP (ref 3.8–10.5)

## 2020-09-22 PROCEDURE — 93010 ELECTROCARDIOGRAM REPORT: CPT

## 2020-09-22 RX ORDER — BUPROPION HYDROCHLORIDE 150 MG/1
300 TABLET, EXTENDED RELEASE ORAL DAILY
Refills: 0 | Status: DISCONTINUED | OUTPATIENT
Start: 2020-09-22 | End: 2020-09-30

## 2020-09-22 RX ORDER — SODIUM CHLORIDE 9 MG/ML
500 INJECTION, SOLUTION INTRAVENOUS
Refills: 0 | Status: DISCONTINUED | OUTPATIENT
Start: 2020-09-23 | End: 2020-09-23

## 2020-09-22 RX ORDER — IMMUNE GLOBULIN (HUMAN) 10 G/100ML
15 INJECTION INTRAVENOUS; SUBCUTANEOUS EVERY 24 HOURS
Refills: 0 | Status: COMPLETED | OUTPATIENT
Start: 2020-09-23 | End: 2020-09-27

## 2020-09-22 RX ORDER — PYRIDOSTIGMINE BROMIDE 60 MG/5ML
60 SOLUTION ORAL THREE TIMES A DAY
Refills: 0 | Status: DISCONTINUED | OUTPATIENT
Start: 2020-09-22 | End: 2020-09-30

## 2020-09-22 RX ORDER — SODIUM CHLORIDE 9 MG/ML
1000 INJECTION, SOLUTION INTRAVENOUS
Refills: 0 | Status: DISCONTINUED | OUTPATIENT
Start: 2020-09-22 | End: 2020-09-30

## 2020-09-22 RX ORDER — LEVOTHYROXINE SODIUM 125 MCG
75 TABLET ORAL DAILY
Refills: 0 | Status: DISCONTINUED | OUTPATIENT
Start: 2020-09-22 | End: 2020-09-23

## 2020-09-22 RX ORDER — ACETAMINOPHEN 500 MG
600 TABLET ORAL
Refills: 0 | Status: COMPLETED | OUTPATIENT
Start: 2020-09-23 | End: 2020-09-27

## 2020-09-22 RX ORDER — GABAPENTIN 400 MG/1
100 CAPSULE ORAL THREE TIMES A DAY
Refills: 0 | Status: DISCONTINUED | OUTPATIENT
Start: 2020-09-22 | End: 2020-09-24

## 2020-09-22 RX ORDER — SODIUM CHLORIDE 9 MG/ML
1000 INJECTION, SOLUTION INTRAVENOUS ONCE
Refills: 0 | Status: COMPLETED | OUTPATIENT
Start: 2020-09-22 | End: 2020-09-22

## 2020-09-22 RX ORDER — DIPHENHYDRAMINE HCL 50 MG
25 CAPSULE ORAL EVERY 24 HOURS
Refills: 0 | Status: DISCONTINUED | OUTPATIENT
Start: 2020-09-23 | End: 2020-09-24

## 2020-09-22 RX ORDER — SODIUM CHLORIDE 9 MG/ML
1000 INJECTION, SOLUTION INTRAVENOUS
Refills: 0 | Status: DISCONTINUED | OUTPATIENT
Start: 2020-09-22 | End: 2020-09-22

## 2020-09-22 RX ORDER — DIPHENHYDRAMINE HCL 50 MG
25 CAPSULE ORAL AT BEDTIME
Refills: 0 | Status: DISCONTINUED | OUTPATIENT
Start: 2020-09-22 | End: 2020-09-30

## 2020-09-22 RX ADMIN — GABAPENTIN 100 MILLIGRAM(S): 400 CAPSULE ORAL at 21:43

## 2020-09-22 RX ADMIN — PYRIDOSTIGMINE BROMIDE 60 MILLIGRAM(S): 60 SOLUTION ORAL at 21:38

## 2020-09-22 RX ADMIN — SODIUM CHLORIDE 450.45 MILLILITER(S): 9 INJECTION, SOLUTION INTRAVENOUS at 19:13

## 2020-09-22 RX ADMIN — GABAPENTIN 100 MILLIGRAM(S): 400 CAPSULE ORAL at 22:44

## 2020-09-22 NOTE — H&P ADULT - PROBLEM SELECTOR PLAN 6
D: Regular, Kosher  E: Replete K<4 and Mg <2  DVT: SCDs  GI: None  IVF: LR at 100cc/h maintenance  Code: FULL CODE

## 2020-09-22 NOTE — H&P ADULT - NSHPREVIEWOFSYSTEMS_GEN_ALL_CORE
REVIEW OF SYSTEMS:    CONSTITUTIONAL: Exhaustion, intermittent subjective fevers  EYES/ENT: No visual changes;  No vertigo or throat pain   NECK: No pain or stiffness  RESPIRATORY: Admits to sensation of dyspnea during muscle spasms; No cough, wheezing, hemoptysis  CARDIOVASCULAR: Chest tightness and intermittent palpitations  GASTROINTESTINAL: Abdominal pain during spasms/constipation, No nausea, vomiting, or hematemesis; No diarrhea. No melena or hematochezia.  GENITOURINARY: No dysuria, frequency or hematuria  NEUROLOGICAL: Muscle spasms with sustained movements  SKIN: Redness of L great toe, no other rashes or lesions   All other review of systems is negative unless indicated above.

## 2020-09-22 NOTE — H&P ADULT - PROBLEM SELECTOR PLAN 1
- IVIG 15mg doses (Gammagard) over 12h (6am-6pm) for 5 doses (9/23-9/27)  - 500cc LR boluses over 1h prior to and after each IVIG dose (5-6am and 6-7pm)  - Tylenol 650mg IV prior to each dose + Benadryl 25mg PO prior  - c/w Pyridostigmine 60mg Pt has hx of dysautonomia c/b POTS treated with 1 round of IVIG in September 2018 at [facility]. Now follows with Dr. Najjar who has referred her for this 2nd round of IVIG. Pt currently reports improvement in her muscle cramping (occurs on usage of muscles) such that she can now tolerate sustained motion up to 30s-10min; this flare has been ongoing for 16 weeks and she has stayed at [facility].  - IVIG 15mg doses (Gammagard) over 12h (6am-6pm) for 5 doses (9/23-9/27)  - 500cc LR boluses over 1h prior to and after each IVIG dose (5-6am and 6-7pm)  - Give maintenance IVF LR 1L at 100cc/h during IVIG doses  - Tylenol 650mg IV prior to each dose + Benadryl 25mg PO prior  - c/w Pyridostigmine 60mg  - c/w home Gabapentin 100mg TID PRN (takes only for muscle spams as needed)  - Pt takes home Corlanor 5mg, not in [facility] formulary so family member will bring for approval tomorrow

## 2020-09-22 NOTE — H&P ADULT - NSHPPHYSICALEXAM_GEN_ALL_CORE
.  VITAL SIGNS:  T(C): 36.7 (09-22-20 @ 16:14), Max: 36.7 (09-22-20 @ 16:14)  T(F): 98 (09-22-20 @ 16:14), Max: 98 (09-22-20 @ 16:14)  HR: 71 (09-22-20 @ 16:14) (71 - 71)  BP: 113/72 (09-22-20 @ 16:14) (113/72 - 113/72)  BP(mean): --  RR: 16 (09-22-20 @ 16:14) (16 - 16)  SpO2: 97% (09-22-20 @ 16:14) (97% - 97%)  Wt(kg): --    PHYSICAL EXAM:    Constitutional: Tired-appearing female laying in bed, NAD  Head: NC/AT  Eyes: PERRL, EOMI, anicteric sclera  ENT: no nasal discharge; uvula midline, no oropharyngeal erythema or exudates; MMM  Neck: supple; no JVD or thyromegaly  Respiratory: CTA B/L; no W/R/R, no retractions  Cardiac: +S1/S2; RRR; no M/R/G; PMI non-displaced  Gastrointestinal: abdomen soft, mildly tender in epigastrium, non-distended; no rebound or guarding; +BSx4  Extremities: WWP, no clubbing or cyanosis; no peripheral edema  Musculoskeletal: NROM x4; no joint swelling, tenderness or erythema  Vascular: 2+ radial, DP pulses B/L  Dermatologic: skin warm, dry and intact; no rashes, wounds, or scars  Lymphatic: no submandibular or cervical LAD  Neurologic: AAOx3; facial muscles fatigue with sustained movement, strength 5/5 in all extremities, sensation in tact in all extremities  Psychiatric: affect and characteristics of appearance, verbalizations, behaviors are appropriate

## 2020-09-22 NOTE — H&P ADULT - ASSESSMENT
45F with PMH of POTS, dysautonomia, and Hashimoto's thyroiditis admitted for scheduled IVIG therapy as a second round of treatment for her dysautonomia. 45F with PMH of POTS, dysautonomia, small fiber neuropathy, and Hashimoto's thyroiditis admitted for scheduled IVIG therapy as a second round of treatment for her dysautonomia.

## 2020-09-22 NOTE — H&P ADULT - NSHPLABSRESULTS_GEN_ALL_CORE
12.6   6.25  )-----------( 240      ( 22 Sep 2020 17:19 )             38.6                                     CAPILLARY BLOOD GLUCOSE

## 2020-09-22 NOTE — H&P ADULT - NSICDXPASTMEDICALHX_GEN_ALL_CORE_FT
PAST MEDICAL HISTORY:  Dysautonomia     Hashimoto's thyroiditis     POTS (postural orthostatic tachycardia syndrome)     Small fiber neuropathy

## 2020-09-22 NOTE — H&P ADULT - PROBLEM SELECTOR PLAN 4
Pt has known hx of small fiber neuropathy.  - c/w home Wellbutrin 300mg daily Pt has hx of Hashimoto's thyroiditis.  - Takes dried thyroid hormone 45mg at home  - Clinical pharmacist recommends Synthroid 75mg daily based on equivalent dosing

## 2020-09-22 NOTE — H&P ADULT - PROBLEM SELECTOR PLAN 3
Pt has hx of Hashimoto's thyroiditis.  - Takes dried thyroid hormone 45mg at home  - Clinical pharmacist recommends Synthroid 75mg daily based on equivalent dosing Pt reports completion of treatment for L toe cellulitis with 9 days of Keflex 500mg TID.   - Dr. Houston recs continuing with IVIG treatment given that risk is low for progression of the cellulitis

## 2020-09-22 NOTE — H&P ADULT - PROBLEM SELECTOR PLAN 5
D: Regular, Kosher  E: Replete K<4 and Mg <2  DVT: SCDs  GI: None  IVF: LR at 100cc/h maintenance  Code: FULL CODE Pt has known hx of small fiber neuropathy.  - c/w home Wellbutrin 300mg daily

## 2020-09-22 NOTE — H&P ADULT - HISTORY OF PRESENT ILLNESS
45F with PMH of POTS (postural orthostatic tachycardia syndrome), dysautonomia, and Hashimoto's thyroiditis presents for scheduled IVIG therapy (referred by Dr. Najjar) for her dysautonomia. Pt experiences intermittent flares of her dysautonomia which have increased in severity this year; her current flare began 16 weeks ago, at the start of which she felt like she was about to faint, lay down in bed (which had usually alleviated her symptoms during prior flares), and consequently experienced tonic muscle spasms as well as the sensation that her "brain was frying". She was brought to [facility] at that time, where she says her vitals were abnormal (she does not recall which vitals/what was abnormal) and she had a sensation of coming in and out of consciousness. She was given Ativan which she says helped a little but only 2 hours later. [facility] [place] in [facility] because her muscle spasms continued to occur with any attempt at movement; her spasms occur with any sustained movement, especially with sitting up in bed, and take minutes to hours to brittany. After working with PT at [facility], she has been able to improve her spasms and she is now able to sit upright for 30s-10min at a time before her muscles begin to spasm. Although her spasms have improved, she now reports feeling generally exhausted and has had decreased PO intake due to difficulty swallowing liquids (outpatient evaluation at a [facility] with esophageal manometry showed 30% failed swallow in June 2020, but patient reports that she has been eating with no nausea, regurgitation, or choking since then). On August 18, pt was seen by Dr. Najjar, who referred her for IVIG treatment given that the patient's insurance was now able to cover this second round of IVIG as treatment for her dysautonomia. Of note, the patient is on Day 9 of Keflex TID for L great toe cellulitis. She mentions that her Rehab Center gives her 2L of Lactated Ringer's per day, and that Dr. Najjar instructed her to mention a low Protein S value that was noted a year ago; she also notes that she had COVID-19 in March 2020. On ROS, she admits to chest tightness and pain c/w prior dysautonomia flares, chronic abdominal pain and constipation, intermittent subjective fevers (Tmax 99.5 orally at home), and denies n/v, changes in vision, changes in smell/taste, muscle weakness, loss of sensation, LOC.    Admission vitals: T98F, HR 71, /72, RR 16, SpO2 97% on RA   45F with PMH of POTS (postural orthostatic tachycardia syndrome), dysautonomia, small fiber neuropathy, and Hashimoto's thyroiditis presents for scheduled IVIG therapy (referred by Dr. Najjar) for her dysautonomia. Pt experiences intermittent flares of her dysautonomia which have increased in severity this year; her current flare began 16 weeks ago, at the start of which she felt like she was about to faint, lay down in bed (which had usually alleviated her symptoms during prior flares), and consequently experienced tonic muscle spasms as well as the sensation that her "brain was frying". She was brought to [facility] at that time, where she says her vitals were abnormal (she does not recall which vitals/what was abnormal) and she had a sensation of coming in and out of consciousness. She was given Ativan which she says helped a little but only 2 hours later. [facility] [place] [facility] because her muscle spasms continued to occur with any attempt at movement; her spasms occur with any sustained movement, especially with sitting up in bed, and take minutes to hours to brittany. After working with PT at [facility], she has been able to improve her spasms and she is now able to sit upright for 30s-10min at a time before her muscles begin to spasm. Although her spasms have improved, she now reports feeling generally exhausted and has had decreased PO intake due to difficulty swallowing liquids (outpatient evaluation at a [place] [facility] with esophageal manometry showed 30% failed swallow in June 2020, but patient reports that she has been eating with no nausea, regurgitation, or choking since then). On August 18, pt was seen by Dr. Najjar, who referred her for IVIG treatment given that the patient's insurance was now able to cover this second round of IVIG as treatment for her dysautonomia. Of note, the patient is on Day 9 of Keflex TID for L great toe cellulitis. She mentions that her Rehab Center gives her 2L of Lactated Ringer's per day, and that Dr. Najjar instructed her to mention a low Protein S value that was noted a year ago; she also notes that she had COVID-19 in March 2020. On ROS, she admits to chest tightness and pain c/w prior dysautonomia flares, chronic abdominal pain and constipation, intermittent subjective fevers (Tmax 99.5 orally at home), and denies n/v, changes in vision, changes in smell/taste, muscle weakness, loss of sensation, LOC.    Admission vitals: T98F, HR 71, /72, RR 16, SpO2 97% on RA

## 2020-09-22 NOTE — H&P ADULT - PROBLEM SELECTOR PLAN 2
Pt reports completion of treatment for L toe cellulitis with 9 days of Keflex 500mg TID.   - Dr. Houston recs continuing with IVIG treatment given that risk is low for progression of the cellulitis Pt admits to chest tightness/pain with some associated L arm numbness. Chest pain is reproducible with palpation of chest wall.  - EKG unremarkable, no evidence concerning for ischemia  - Trop negative  - Given that this pt admits to similar pain during flares of her dysautonomia and in light of the fact that it is reproducible with no ischemic EKG findings, negative trops, and no cardiac risk factors in PMH, no further workup indicated at this time Pt admits to chest tightness/pain with some associated L arm numbness. Chest pain is reproducible with palpation of chest wall.  - f/u EKG  - Trop negative  - Given that this pt admits to similar pain during flares of her dysautonomia and in light of the fact that it is reproducible with negative trops, and no cardiac risk factors in PMH, no further workup prior to IVIG treatment; follow up EKG to ensure no ischemic changes tomorrow 9/23

## 2020-09-23 ENCOUNTER — FORM ENCOUNTER (OUTPATIENT)
Age: 45
End: 2020-09-23

## 2020-09-23 LAB
ALBUMIN SERPL ELPH-MCNC: 3.6 G/DL — SIGNIFICANT CHANGE UP (ref 3.3–5)
ALP SERPL-CCNC: 50 U/L — SIGNIFICANT CHANGE UP (ref 40–120)
ALT FLD-CCNC: 16 U/L — SIGNIFICANT CHANGE UP (ref 10–45)
ANION GAP SERPL CALC-SCNC: 9 MMOL/L — SIGNIFICANT CHANGE UP (ref 5–17)
AST SERPL-CCNC: 16 U/L — SIGNIFICANT CHANGE UP (ref 10–40)
BASOPHILS # BLD AUTO: 0.01 K/UL — SIGNIFICANT CHANGE UP (ref 0–0.2)
BASOPHILS NFR BLD AUTO: 0.2 % — SIGNIFICANT CHANGE UP (ref 0–2)
BILIRUB SERPL-MCNC: 0.2 MG/DL — SIGNIFICANT CHANGE UP (ref 0.2–1.2)
BUN SERPL-MCNC: 10 MG/DL — SIGNIFICANT CHANGE UP (ref 7–23)
CALCIUM SERPL-MCNC: 9.1 MG/DL — SIGNIFICANT CHANGE UP (ref 8.4–10.5)
CHLORIDE SERPL-SCNC: 105 MMOL/L — SIGNIFICANT CHANGE UP (ref 96–108)
CO2 SERPL-SCNC: 23 MMOL/L — SIGNIFICANT CHANGE UP (ref 22–31)
CREAT SERPL-MCNC: 0.72 MG/DL — SIGNIFICANT CHANGE UP (ref 0.5–1.3)
EOSINOPHIL # BLD AUTO: 0.08 K/UL — SIGNIFICANT CHANGE UP (ref 0–0.5)
EOSINOPHIL NFR BLD AUTO: 1.7 % — SIGNIFICANT CHANGE UP (ref 0–6)
GLUCOSE SERPL-MCNC: 92 MG/DL — SIGNIFICANT CHANGE UP (ref 70–99)
HCT VFR BLD CALC: 35 % — SIGNIFICANT CHANGE UP (ref 34.5–45)
HGB BLD-MCNC: 11.2 G/DL — LOW (ref 11.5–15.5)
IGA FLD-MCNC: 185 MG/DL — SIGNIFICANT CHANGE UP (ref 84–499)
IGG FLD-MCNC: 1353 MG/DL — SIGNIFICANT CHANGE UP (ref 610–1660)
IGM SERPL-MCNC: 96 MG/DL — SIGNIFICANT CHANGE UP (ref 35–242)
IMM GRANULOCYTES NFR BLD AUTO: 0.4 % — SIGNIFICANT CHANGE UP (ref 0–1.5)
KAPPA LC SER QL IFE: 1.7 MG/DL — SIGNIFICANT CHANGE UP (ref 0.33–1.94)
KAPPA/LAMBDA FREE LIGHT CHAIN RATIO, SERUM: 1.45 RATIO — SIGNIFICANT CHANGE UP (ref 0.26–1.65)
LAMBDA LC SER QL IFE: 1.17 MG/DL — SIGNIFICANT CHANGE UP (ref 0.57–2.63)
LYMPHOCYTES # BLD AUTO: 1.61 K/UL — SIGNIFICANT CHANGE UP (ref 1–3.3)
LYMPHOCYTES # BLD AUTO: 34.8 % — SIGNIFICANT CHANGE UP (ref 13–44)
MAGNESIUM SERPL-MCNC: 1.9 MG/DL — SIGNIFICANT CHANGE UP (ref 1.6–2.6)
MCHC RBC-ENTMCNC: 26.4 PG — LOW (ref 27–34)
MCHC RBC-ENTMCNC: 32 GM/DL — SIGNIFICANT CHANGE UP (ref 32–36)
MCV RBC AUTO: 82.5 FL — SIGNIFICANT CHANGE UP (ref 80–100)
MONOCYTES # BLD AUTO: 0.51 K/UL — SIGNIFICANT CHANGE UP (ref 0–0.9)
MONOCYTES NFR BLD AUTO: 11 % — SIGNIFICANT CHANGE UP (ref 2–14)
NEUTROPHILS # BLD AUTO: 2.4 K/UL — SIGNIFICANT CHANGE UP (ref 1.8–7.4)
NEUTROPHILS NFR BLD AUTO: 51.9 % — SIGNIFICANT CHANGE UP (ref 43–77)
NRBC # BLD: 0 /100 WBCS — SIGNIFICANT CHANGE UP (ref 0–0)
PHOSPHATE SERPL-MCNC: 3.8 MG/DL — SIGNIFICANT CHANGE UP (ref 2.5–4.5)
PLATELET # BLD AUTO: 193 K/UL — SIGNIFICANT CHANGE UP (ref 150–400)
POTASSIUM SERPL-MCNC: 4.1 MMOL/L — SIGNIFICANT CHANGE UP (ref 3.5–5.3)
POTASSIUM SERPL-SCNC: 4.1 MMOL/L — SIGNIFICANT CHANGE UP (ref 3.5–5.3)
PROT S FREE AG PPP IA-ACNC: 45 % — LOW (ref 61–131)
PROT SERPL-MCNC: 6.1 G/DL — SIGNIFICANT CHANGE UP (ref 6–8.3)
RBC # BLD: 4.24 M/UL — SIGNIFICANT CHANGE UP (ref 3.8–5.2)
RBC # FLD: 13.3 % — SIGNIFICANT CHANGE UP (ref 10.3–14.5)
SODIUM SERPL-SCNC: 137 MMOL/L — SIGNIFICANT CHANGE UP (ref 135–145)
WBC # BLD: 4.63 K/UL — SIGNIFICANT CHANGE UP (ref 3.8–10.5)
WBC # FLD AUTO: 4.63 K/UL — SIGNIFICANT CHANGE UP (ref 3.8–10.5)

## 2020-09-23 PROCEDURE — 99223 1ST HOSP IP/OBS HIGH 75: CPT

## 2020-09-23 RX ORDER — CEPHALEXIN 500 MG
250 CAPSULE ORAL EVERY 6 HOURS
Refills: 0 | Status: DISCONTINUED | OUTPATIENT
Start: 2020-09-23 | End: 2020-09-23

## 2020-09-23 RX ORDER — CEPHALEXIN 500 MG
500 CAPSULE ORAL
Refills: 0 | Status: DISCONTINUED | OUTPATIENT
Start: 2020-09-23 | End: 2020-09-25

## 2020-09-23 RX ORDER — THYROID 120 MG
15 TABLET ORAL
Refills: 0 | Status: DISCONTINUED | OUTPATIENT
Start: 2020-09-23 | End: 2020-09-23

## 2020-09-23 RX ORDER — THYROID 120 MG
15 TABLET ORAL
Refills: 0 | Status: DISCONTINUED | OUTPATIENT
Start: 2020-09-23 | End: 2020-09-30

## 2020-09-23 RX ORDER — SODIUM CHLORIDE 9 MG/ML
1000 INJECTION, SOLUTION INTRAVENOUS
Refills: 0 | Status: COMPLETED | OUTPATIENT
Start: 2020-09-24 | End: 2020-09-27

## 2020-09-23 RX ORDER — IVABRADINE 7.5 MG/1
1 TABLET, FILM COATED ORAL
Qty: 0 | Refills: 0 | DISCHARGE

## 2020-09-23 RX ORDER — IVABRADINE 7.5 MG/1
5 TABLET, FILM COATED ORAL
Refills: 0 | Status: DISCONTINUED | OUTPATIENT
Start: 2020-09-23 | End: 2020-09-30

## 2020-09-23 RX ORDER — SODIUM CHLORIDE 9 MG/ML
1000 INJECTION, SOLUTION INTRAVENOUS
Refills: 0 | Status: COMPLETED | OUTPATIENT
Start: 2020-09-23 | End: 2020-09-27

## 2020-09-23 RX ORDER — MAGNESIUM ASPARTATE HCL 61 MG(615)
2 TABLET, DELAYED RELEASE (ENTERIC COATED) ORAL
Qty: 0 | Refills: 0 | DISCHARGE

## 2020-09-23 RX ADMIN — Medication 240 MILLIGRAM(S): at 06:07

## 2020-09-23 RX ADMIN — SODIUM CHLORIDE 1000 MILLILITER(S): 9 INJECTION, SOLUTION INTRAVENOUS at 18:44

## 2020-09-23 RX ADMIN — IVABRADINE 5 MILLIGRAM(S): 7.5 TABLET, FILM COATED ORAL at 18:00

## 2020-09-23 RX ADMIN — PYRIDOSTIGMINE BROMIDE 60 MILLIGRAM(S): 60 SOLUTION ORAL at 06:07

## 2020-09-23 RX ADMIN — BUPROPION HYDROCHLORIDE 300 MILLIGRAM(S): 150 TABLET, EXTENDED RELEASE ORAL at 06:09

## 2020-09-23 RX ADMIN — Medication 500 MILLIGRAM(S): at 18:00

## 2020-09-23 RX ADMIN — Medication 25 MILLIGRAM(S): at 06:07

## 2020-09-23 RX ADMIN — PYRIDOSTIGMINE BROMIDE 60 MILLIGRAM(S): 60 SOLUTION ORAL at 12:20

## 2020-09-23 RX ADMIN — IMMUNE GLOBULIN (HUMAN) 12.5 GRAM(S): 10 INJECTION INTRAVENOUS; SUBCUTANEOUS at 06:42

## 2020-09-23 RX ADMIN — Medication 500 MILLIGRAM(S): at 12:21

## 2020-09-23 RX ADMIN — SODIUM CHLORIDE 500 MILLILITER(S): 9 INJECTION, SOLUTION INTRAVENOUS at 05:06

## 2020-09-23 NOTE — PROGRESS NOTE ADULT - ATTENDING COMMENTS
I was physically present for the key portions of the evaluation and managemnent (E/M) service provided.  I agree with the above history, physical, and plan which I have reviewed and edited where appropriate, with the exceptions as per my note.    pt seen and examined, complicated case. chart reviewed.    Review of Systems:  Review of Systems: REVIEW OF SYSTEMS:    CONSTITUTIONAL: Exhaustion, intermittent subjective fevers  EYES/ENT: No visual changes;  No vertigo or throat pain   NECK: No pain or stiffness  RESPIRATORY: Admits to sensation of dyspnea during muscle spasms; No cough, wheezing, hemoptysis  CARDIOVASCULAR: Chest tightness and intermittent palpitations  GASTROINTESTINAL: Abdominal pain during spasms/constipation, No nausea, vomiting, or hematemesis; No diarrhea. No melena or hematochezia.  GENITOURINARY: No dysuria, frequency or hematuria  NEUROLOGICAL: Muscle spasms with sustained movements  SKIN: Redness of L great toe, no other rashes or lesions   All other review of systems is negative unless indicated above.      neuro exam  ao3, flat affect  perrl, eomi, face symm, tup ml, no nyst, vff  5/5 throughout  coord intact    AP: sensory neuropathy, autoimmune disorder.    - cont home meds  - IVIG  - ***DVT PPX***

## 2020-09-23 NOTE — DIETITIAN INITIAL EVALUATION ADULT. - ENERGY NEEDS
Height: 59" Weight: 91lbs, IBW 100lbs+/-10%, %IBW 91%, BMI 18.3,  ABW used for calculations as pt between % of IBW, adjusted for malnutrition

## 2020-09-23 NOTE — DIETITIAN INITIAL EVALUATION ADULT. - PROBLEM SELECTOR PLAN 3
Pt reports completion of treatment for L toe cellulitis with 9 days of Keflex 500mg TID.   - Dr. Houston recs continuing with IVIG treatment given that risk is low for progression of the cellulitis

## 2020-09-23 NOTE — PHYSICAL THERAPY INITIAL EVALUATION ADULT - GAIT DEVIATIONS NOTED, PT EVAL
sig inc time to complete all commands, sig dec crispin, reports dizziness however VSS, see flow sheet for vitals, unsteady gait

## 2020-09-23 NOTE — CHART NOTE - TREATMENT: THE FOLLOWING DIET HAS BEEN RECOMMENDED
Diet, Regular:   Kosher (09-22-20 @ 17:27) [Active]    1. Recommend pending VFSS/MBS advance diet as tolerated to regular +Ensure pudding BID (each 170kcal/4gpro) with consistency per slp. Monitor tolerance and encourage intake.   2. If unable to advance diet, consult for res.   3. Monitor lytes and replete   4. Trend wts 2-3x per week   5. Bowel regimen per team   6. RD to remain available prn

## 2020-09-23 NOTE — SWALLOW BEDSIDE ASSESSMENT ADULT - NS SPL SWALLOW CLINIC TRIAL FT
Pt w/ prolonged and weak mastication w/ solids, reduced bolus manipulation, and effortful A-P transport w/ tongue pumping. Pt took multiple swallows per small volume bolus (solid and liquid), w/ visible effort, indicative of weak/inefficient pharyngeal swallow. Pt also reported sensation of pharyngeal stasis, with both liquid and solid; pt did not benefit from use of liquid wash or multiple swallows to clear sensation. Pt w/ immediate and delayed coughing following each trial, suggestive of aspiration. No further trials were administered given pt reported severe fatigue ("my body feels like jelly") and needed to lay down.

## 2020-09-23 NOTE — DIETITIAN INITIAL EVALUATION ADULT. - PROBLEM SELECTOR PLAN 4
Pt has hx of Hashimoto's thyroiditis.  - Takes dried thyroid hormone 45mg at home  - Clinical pharmacist recommends Synthroid 75mg daily based on equivalent dosing

## 2020-09-23 NOTE — PROGRESS NOTE ADULT - PROBLEM SELECTOR PLAN 2
Pt admits to chest tightness/pain with some associated L arm numbness. Chest pain is reproducible with palpation of chest wall.  - f/u EKG  - Trop negative  - Given that this pt admits to similar pain during flares of her dysautonomia and in light of the fact that it is reproducible with negative trops, and no cardiac risk factors in PMH, no further workup prior to IVIG treatment; follow up EKG to ensure no ischemic changes tomorrow 9/23

## 2020-09-23 NOTE — PROGRESS NOTE ADULT - SUBJECTIVE AND OBJECTIVE BOX
INTERVAL HPI/OVERNIGHT EVENTS:  Patient was seen and examined at bedside. As per nurse and patient, no o/n events, patient resting comfortably. No complaints at this time. Patient denies: fever, chills, dizziness, weakness, HA, Changes in vision, CP, palpitations, SOB, cough, N/V/D/C, dysuria, changes in bowel movements, LE edema. ROS otherwise negative.    VITAL SIGNS:  T(F): 97.6 (09-23-20 @ 07:40)  HR: 59 (09-23-20 @ 07:40)  BP: 95/60 (09-23-20 @ 07:40)  RR: 16 (09-23-20 @ 07:40)  SpO2: 98% (09-23-20 @ 07:40)  Wt(kg): --    PHYSICAL EXAM:    General: Nad, tired-appearing female   HEENT: PEERL, EOMI, anicteric sclera, MMM  Respiratory: CTA b/l, no wheezes, rales, or rhonchi  Cardiovascular: +RRR, +S1/S2, no murmurs, rubs, or gallops  Gastrointestinal: Soft, nontender, nondistended, normoactive bowel sounds x4 quadrants  Extremities: WWP, no peripheral edema, no cyanosis b/l  Vascular: 2+ radial, DP pulses b/l  Neurological:  - Mental Status: AAOx3; speech is fluent with intact naming, repetition, and comprehension  - Cranial Nerves II - XII:  II: PEERLA; visual fields are full to confrontation  III, IV, VI: EOMI, no nystagmus appreciated  V: facial sensation intact to light touch V1-V3 b/l  VII: no ptosis, no facial droop, symmetric eyebrow raise and closure; overall facial muscle fatigue following prolonged use  VIII: hearing intact to finger rub b/l  IX, X: uvula is midline, soft palate rises symmetrically  XI: head turning and shoulder shrug intact b/l  XII: tongue protrusion midline  - Motor: strength is 5/5 b/l LLE & RLE, 5/5 b/l LUE & RUE. normal muscle bulk and tone throughout, no pronator drift        MEDICATIONS  (STANDING):  acetaminophen  IVPB .. 600 milliGRAM(s) IV Intermittent <User Schedule>  buPROPion XL . 300 milliGRAM(s) Oral daily  diphenhydrAMINE 25 milliGRAM(s) Oral every 24 hours  immune   globulin 10% (GAMMAGARD) IVPB 15 Gram(s) IV Intermittent every 24 hours  lactated ringers Bolus 500 milliLiter(s) IV Bolus <User Schedule>  lactated ringers Bolus 500 milliLiter(s) IV Bolus <User Schedule>  lactated ringers. 1000 milliLiter(s) (100 mL/Hr) IV Continuous <Continuous>  levothyroxine 75 MICROGram(s) Oral daily  pyridostigmine 60 milliGRAM(s) Oral three times a day    MEDICATIONS  (PRN):  diphenhydrAMINE   Injectable 25 milliGRAM(s) IV Push at bedtime PRN Insomnia  gabapentin 100 milliGRAM(s) Oral three times a day PRN Muscle spasm      Allergies    No Known Allergies    Intolerances    erythromycin (Stomach Upset)      LABS:                        11.2   4.63  )-----------( 193      ( 23 Sep 2020 07:11 )             35.0     09-23    137  |  105  |  10  ----------------------------<  92  4.1   |  23  |  0.72    Ca    9.1      23 Sep 2020 07:11  Phos  3.8     09-23  Mg     1.9     09-23    TPro  6.1  /  Alb  3.6  /  TBili  0.2  /  DBili  x   /  AST  16  /  ALT  16  /  AlkPhos  50  09-23          RADIOLOGY & ADDITIONAL TESTS:  Reviewed INTERVAL HPI/OVERNIGHT EVENTS:  Patient was seen and examined at bedside. As per overnight team and patient, no o/n events, patient appearing very fatigued. Pt reports having a headache, feels weak, feels that neck is progressively getting more tense. Has had trouble finding a comfortable spot to lay down. Denies F/N/V/C/SOB/CP/ no change in bowel or bladder habits (uses bedpan).     VITAL SIGNS:  T(F): 97.6 (09-23-20 @ 07:40)  HR: 59 (09-23-20 @ 07:40)  BP: 95/60 (09-23-20 @ 07:40)  RR: 16 (09-23-20 @ 07:40)  SpO2: 98% (09-23-20 @ 07:40)  Wt(kg): --    PHYSICAL EXAM:    General: Nad, tired-appearing female   HEENT: PEERL, EOMI, anicteric sclera, MMM  Respiratory: CTA b/l, no wheezes, rales, or rhonchi  Cardiovascular: +RRR, +S1/S2, no murmurs, rubs, or gallops  Gastrointestinal: Soft, nontender, nondistended, normoactive bowel sounds x4 quadrants  Extremities: WWP, no peripheral edema, no cyanosis b/l  Vascular: 2+ radial, DP pulses b/l  Neurological:  - Mental Status: AAOx3; speech is fluent with intact naming, repetition, and comprehension  - Cranial Nerves II - XII:  II: PEERLA; visual fields are full to confrontation  III, IV, VI: EOMI, no nystagmus appreciated  V: facial sensation intact to light touch V1-V3 b/l  VII: no ptosis, no facial droop, symmetric eyebrow raise and closure; overall facial muscle fatigue following prolonged use  VIII: hearing intact to finger rub b/l  IX, X: uvula is midline, soft palate rises symmetrically  XI: head turning and shoulder shrug intact b/l  XII: tongue protrusion midline  - Motor: strength is 5/5 b/l LLE & RLE, 5/5 b/l LUE & RUE. normal muscle bulk and tone throughout, no pronator drift        MEDICATIONS  (STANDING):  acetaminophen  IVPB .. 600 milliGRAM(s) IV Intermittent <User Schedule>  buPROPion XL . 300 milliGRAM(s) Oral daily  diphenhydrAMINE 25 milliGRAM(s) Oral every 24 hours  immune   globulin 10% (GAMMAGARD) IVPB 15 Gram(s) IV Intermittent every 24 hours  lactated ringers Bolus 500 milliLiter(s) IV Bolus <User Schedule>  lactated ringers Bolus 500 milliLiter(s) IV Bolus <User Schedule>  lactated ringers. 1000 milliLiter(s) (100 mL/Hr) IV Continuous <Continuous>  levothyroxine 75 MICROGram(s) Oral daily  pyridostigmine 60 milliGRAM(s) Oral three times a day    MEDICATIONS  (PRN):  diphenhydrAMINE   Injectable 25 milliGRAM(s) IV Push at bedtime PRN Insomnia  gabapentin 100 milliGRAM(s) Oral three times a day PRN Muscle spasm      Allergies    No Known Allergies    Intolerances    erythromycin (Stomach Upset)      LABS:                        11.2   4.63  )-----------( 193      ( 23 Sep 2020 07:11 )             35.0     09-23    137  |  105  |  10  ----------------------------<  92  4.1   |  23  |  0.72    Ca    9.1      23 Sep 2020 07:11  Phos  3.8     09-23  Mg     1.9     09-23    TPro  6.1  /  Alb  3.6  /  TBili  0.2  /  DBili  x   /  AST  16  /  ALT  16  /  AlkPhos  50  09-23          RADIOLOGY & ADDITIONAL TESTS:  Reviewed

## 2020-09-23 NOTE — PHYSICAL THERAPY INITIAL EVALUATION ADULT - PERTINENT HX OF CURRENT PROBLEM, REHAB EVAL
45F presents for scheduled IVIG therapy (referred by Dr. Najjar) for her dysautonomia. Pt experiences intermittent flares of her dysautonomia which have increased in severity this year; her current flare began 16 weeks ago, at the start of which she felt like she was about to faint, lay down in bed (which had usually alleviated her symptoms during prior flares), and consequently experienced tonic muscle spasms as well as the sensation that her "brain was frying"

## 2020-09-23 NOTE — SWALLOW BEDSIDE ASSESSMENT ADULT - SLP PERTINENT HISTORY OF CURRENT PROBLEM
PMH of POTS, dysautonomia, small fiber neuropathy, and Hashimoto's thyroiditis admitted for scheduled IVIG therapy as a second round of treatment for her dysautonomia.

## 2020-09-23 NOTE — SWALLOW BEDSIDE ASSESSMENT ADULT - SWALLOW EVAL: DIAGNOSIS
Pt presents w/ at least moderate oropharyngeal dysphagia in the setting of dysautonomia. Dysphagia is characterized by reduced bolus prep and transport, overt signs of aspiration, and likely weak/inefficient pharyngeal swallow w/ stasis. Pt fatigued very easily, following just 3 trials, and needed to lay down. Given clinical presentation and very poor endurance, pt is neither safe for an oral diet nor is she likely to receive adequate nutrition/hydration via an oral diet at this time. Recs for NPO w/ alternative short-term means of nutrition/hydration/medication at this time. Will follow closely to assess improvement in swallow function as IVIG treatment progresses. Recs for MBS/VFSS for instrumental assessment as pt clinically returns to baseline.

## 2020-09-23 NOTE — PROGRESS NOTE ADULT - PROBLEM SELECTOR PLAN 1
Pt has hx of dysautonomia c/b POTS [place] [facility] [place] with Dr. Najjar who has referred her for this 2nd round of IVIG; Dr. Houston currently following pt at [facility]. Pt currently reports improvement in her muscle cramping (occurs on usage of muscles) such that she can now tolerate sustained motion up to 30s-10min; this flare has been ongoing for 16 weeks and she has stayed at [facility].  - IVIG 15mg doses (Gammagard) over 12h (6am-6pm) for 5 doses (9/23-9/27)  - 1L LR boluses over 1h prior to and after each IVIG dose (5-6am and 6-7pm)  - Give maintenance IVF LR 1L at 100cc/h during IVIG doses  - Tylenol 650mg IV prior to each dose + Benadryl 25mg PO prior  - c/w Pyridostigmine 60mg  - c/w home Gabapentin 100mg TID PRN (takes only for muscle spams as needed)  - Pt takes home Corlanor 5mg and Pollock thyroid, neither in [facility] formulary so family member will bring for approval tomorrow  - F/u speech & swallow recs; holding off on NG tube for now, will ask for afternoon re-eval

## 2020-09-23 NOTE — DIETITIAN INITIAL EVALUATION ADULT. - ADD RECOMMEND
1. Recommend pending VFSS/MBS advance diet as tolerated to regular +Ensure pudding BID (each 170kcal/4gpro) with consistency per slp. Monitor tolerance and encourage intake. 2. If unable to advance diet, consult for res. 3. Monitor lytes and replete 4. Trend wts 2-3x per week 5. Bowel regimen per team 6. RD to remain available prn

## 2020-09-23 NOTE — SWALLOW BEDSIDE ASSESSMENT ADULT - COMMENTS
Pt has hx of dysautonomia c/b POTS [place] [facility] [place] Dr. Najjar who has referred her for this 2nd round of IVIG. Pt currently reports improvement in her muscle cramping (occurs on usage of muscles) such that she can now tolerate sustained motion up to 30s-10min; this flare has been ongoing for 16 weeks and she has stayed at [facility].    Per pt, pt has had on-going difficulty swallowing, which waxes and wanes along with dysautonomia, characterized by difficulty chewing and moving food in her mouth, and weak throat muscles w/ pharyngeal stasis. She also described sensation of food/liquid not passing through her throat and "bubbling up", like "a clogged drain". Pt endorsed intermittent coughing w/ liquids. Pt also endorsed ~20 lb weight loss since Feb 2020. Pt has been seen by outside GI, received esophageal manometry ("30% failed swallow" per H&P) and esophagram ("normal" per pt).     Pt reported today is a "bad day" for swallowing. She is in the middle of receiving the first of 5 rounds of IVIG.

## 2020-09-23 NOTE — DIETITIAN INITIAL EVALUATION ADULT. - MALNUTRITION
Pt meets criteria for severe PCM 2/2 significant wt loss, likely meeting <75% EER >1month. Suspect severe PCM

## 2020-09-23 NOTE — DIETITIAN INITIAL EVALUATION ADULT. - PROBLEM SELECTOR PLAN 1
Pt has hx of dysautonomia c/b POTS treated with 1 round of [place]IG in September [place] [facility] [place]. Now follows with Dr. Najjar who has referred her for this 2nd round of IVIG. Pt currently reports improvement in her muscle cramping (occurs on usage of muscles) such that she can now tolerate sustained motion up to 30s-10min; this flare has been ongoing for 16 weeks and she has stayed at [facility].  - IVIG 15mg doses (Gammagard) over 12h (6am-6pm) for 5 doses (9/23-9/27)  - 500cc LR boluses over 1h prior to and after each IVIG dose (5-6am and 6-7pm)  - Give maintenance IVF LR 1L at 100cc/h during IVIG doses  - Tylenol 650mg IV prior to each dose + Benadryl 25mg PO prior  - c/w Pyridostigmine 60mg  - c/w home Gabapentin 100mg TID PRN (takes only for muscle spams as needed)  - Pt takes home Corlanor 5mg, not in [facility] formulary so family member will bring for approval tomorrow

## 2020-09-23 NOTE — DIETITIAN INITIAL EVALUATION ADULT. - OTHER INFO
45F with PMH of POTS, dysautonomia, small fiber neuropathy, and Hashimoto's thyroiditis admitted for scheduled IVIG therapy as a second round of treatment for her dysautonomia. Pt seen in room, resting in bed. Pt reports difficulty chewing/swallowing PTA, reports it comes and goes. Pt reports at times she is able to chew/swallow anything then at others she can't get anything down. Pt is currently NPO s/p swallow bedside assessment with recs for VFSS/MBS. Pt reports UBW in Feb 2020 ~110-118lbs, reports wt loss unsure of amount. ABW 90lbs reflects 19lb/17% wt loss x~7months (significant). Pt appears thin however no sign of muscle or fat loss at this time. Pt meets criteria for severe PCM 2/2 significant wt loss, likely meeting <75% EER >1month. Pt reports she does not like Ensure Enlive or Ensure Clear. RD discussed importance of optimal PO intake once diet advanced and adequate protein intake. Pt denies N/V, reports constipation. No edema noted. Skin wdl. Please see recs below. Will continue to follow per RD protocol.

## 2020-09-23 NOTE — PROGRESS NOTE ADULT - ASSESSMENT
45F with PMH of POTS, dysautonomia, small fiber neuropathy, and Hashimoto's thyroiditis admitted for scheduled IVIG therapy as a second round of treatment for her dysautonomia (9/23-9/27).

## 2020-09-24 LAB
ALBUMIN SERPL ELPH-MCNC: 3.7 G/DL — SIGNIFICANT CHANGE UP (ref 3.3–5)
ALP SERPL-CCNC: 47 U/L — SIGNIFICANT CHANGE UP (ref 40–120)
ALT FLD-CCNC: 15 U/L — SIGNIFICANT CHANGE UP (ref 10–45)
ANION GAP SERPL CALC-SCNC: 11 MMOL/L — SIGNIFICANT CHANGE UP (ref 5–17)
AST SERPL-CCNC: 15 U/L — SIGNIFICANT CHANGE UP (ref 10–40)
BASOPHILS # BLD AUTO: 0.02 K/UL — SIGNIFICANT CHANGE UP (ref 0–0.2)
BASOPHILS NFR BLD AUTO: 0.6 % — SIGNIFICANT CHANGE UP (ref 0–2)
BILIRUB SERPL-MCNC: 0.4 MG/DL — SIGNIFICANT CHANGE UP (ref 0.2–1.2)
BUN SERPL-MCNC: 10 MG/DL — SIGNIFICANT CHANGE UP (ref 7–23)
CALCIUM SERPL-MCNC: 9 MG/DL — SIGNIFICANT CHANGE UP (ref 8.4–10.5)
CHLORIDE SERPL-SCNC: 102 MMOL/L — SIGNIFICANT CHANGE UP (ref 96–108)
CO2 SERPL-SCNC: 23 MMOL/L — SIGNIFICANT CHANGE UP (ref 22–31)
CREAT SERPL-MCNC: 0.69 MG/DL — SIGNIFICANT CHANGE UP (ref 0.5–1.3)
EOSINOPHIL # BLD AUTO: 0.07 K/UL — SIGNIFICANT CHANGE UP (ref 0–0.5)
EOSINOPHIL NFR BLD AUTO: 2 % — SIGNIFICANT CHANGE UP (ref 0–6)
GLUCOSE SERPL-MCNC: 71 MG/DL — SIGNIFICANT CHANGE UP (ref 70–99)
HCT VFR BLD CALC: 35 % — SIGNIFICANT CHANGE UP (ref 34.5–45)
HGB BLD-MCNC: 11.3 G/DL — LOW (ref 11.5–15.5)
IMM GRANULOCYTES NFR BLD AUTO: 0.3 % — SIGNIFICANT CHANGE UP (ref 0–1.5)
LYMPHOCYTES # BLD AUTO: 1.23 K/UL — SIGNIFICANT CHANGE UP (ref 1–3.3)
LYMPHOCYTES # BLD AUTO: 35.9 % — SIGNIFICANT CHANGE UP (ref 13–44)
MAGNESIUM SERPL-MCNC: 1.7 MG/DL — SIGNIFICANT CHANGE UP (ref 1.6–2.6)
MCHC RBC-ENTMCNC: 26.8 PG — LOW (ref 27–34)
MCHC RBC-ENTMCNC: 32.3 GM/DL — SIGNIFICANT CHANGE UP (ref 32–36)
MCV RBC AUTO: 83.1 FL — SIGNIFICANT CHANGE UP (ref 80–100)
MONOCYTES # BLD AUTO: 0.38 K/UL — SIGNIFICANT CHANGE UP (ref 0–0.9)
MONOCYTES NFR BLD AUTO: 11.1 % — SIGNIFICANT CHANGE UP (ref 2–14)
NEUTROPHILS # BLD AUTO: 1.72 K/UL — LOW (ref 1.8–7.4)
NEUTROPHILS NFR BLD AUTO: 50.1 % — SIGNIFICANT CHANGE UP (ref 43–77)
NRBC # BLD: 0 /100 WBCS — SIGNIFICANT CHANGE UP (ref 0–0)
PHOSPHATE SERPL-MCNC: 3.9 MG/DL — SIGNIFICANT CHANGE UP (ref 2.5–4.5)
PLATELET # BLD AUTO: 164 K/UL — SIGNIFICANT CHANGE UP (ref 150–400)
POTASSIUM SERPL-MCNC: 4 MMOL/L — SIGNIFICANT CHANGE UP (ref 3.5–5.3)
POTASSIUM SERPL-SCNC: 4 MMOL/L — SIGNIFICANT CHANGE UP (ref 3.5–5.3)
PROT SERPL-MCNC: 6.7 G/DL — SIGNIFICANT CHANGE UP (ref 6–8.3)
RBC # BLD: 4.21 M/UL — SIGNIFICANT CHANGE UP (ref 3.8–5.2)
RBC # FLD: 13.2 % — SIGNIFICANT CHANGE UP (ref 10.3–14.5)
SODIUM SERPL-SCNC: 136 MMOL/L — SIGNIFICANT CHANGE UP (ref 135–145)
WBC # BLD: 3.43 K/UL — LOW (ref 3.8–10.5)
WBC # FLD AUTO: 3.43 K/UL — LOW (ref 3.8–10.5)

## 2020-09-24 PROCEDURE — 99232 SBSQ HOSP IP/OBS MODERATE 35: CPT

## 2020-09-24 PROCEDURE — 74230 X-RAY XM SWLNG FUNCJ C+: CPT | Mod: 26

## 2020-09-24 RX ORDER — ENOXAPARIN SODIUM 100 MG/ML
40 INJECTION SUBCUTANEOUS EVERY 24 HOURS
Refills: 0 | Status: DISCONTINUED | OUTPATIENT
Start: 2020-09-24 | End: 2020-09-25

## 2020-09-24 RX ORDER — DIPHENHYDRAMINE HCL 50 MG
25 CAPSULE ORAL DAILY
Refills: 0 | Status: DISCONTINUED | OUTPATIENT
Start: 2020-09-24 | End: 2020-09-24

## 2020-09-24 RX ORDER — DIPHENHYDRAMINE HCL 50 MG
25 CAPSULE ORAL EVERY 24 HOURS
Refills: 0 | Status: COMPLETED | OUTPATIENT
Start: 2020-09-25 | End: 2020-09-27

## 2020-09-24 RX ORDER — GABAPENTIN 400 MG/1
100 CAPSULE ORAL THREE TIMES A DAY
Refills: 0 | Status: DISCONTINUED | OUTPATIENT
Start: 2020-09-24 | End: 2020-09-26

## 2020-09-24 RX ORDER — DIPHENHYDRAMINE HCL 50 MG
25 CAPSULE ORAL EVERY 24 HOURS
Refills: 0 | Status: DISCONTINUED | OUTPATIENT
Start: 2020-09-24 | End: 2020-09-24

## 2020-09-24 RX ORDER — CHLORHEXIDINE GLUCONATE 213 G/1000ML
1 SOLUTION TOPICAL
Refills: 0 | Status: DISCONTINUED | OUTPATIENT
Start: 2020-09-24 | End: 2020-09-30

## 2020-09-24 RX ADMIN — Medication 15 MILLIGRAM(S): at 08:53

## 2020-09-24 RX ADMIN — Medication 25 MILLIGRAM(S): at 06:20

## 2020-09-24 RX ADMIN — IVABRADINE 5 MILLIGRAM(S): 7.5 TABLET, FILM COATED ORAL at 06:53

## 2020-09-24 RX ADMIN — Medication 15 MILLIGRAM(S): at 17:28

## 2020-09-24 RX ADMIN — Medication 600 MILLIGRAM(S): at 07:10

## 2020-09-24 RX ADMIN — SODIUM CHLORIDE 1000 MILLILITER(S): 9 INJECTION, SOLUTION INTRAVENOUS at 05:14

## 2020-09-24 RX ADMIN — Medication 240 MILLIGRAM(S): at 06:12

## 2020-09-24 RX ADMIN — Medication 500 MILLIGRAM(S): at 06:53

## 2020-09-24 RX ADMIN — CHLORHEXIDINE GLUCONATE 1 APPLICATION(S): 213 SOLUTION TOPICAL at 17:30

## 2020-09-24 RX ADMIN — PYRIDOSTIGMINE BROMIDE 60 MILLIGRAM(S): 60 SOLUTION ORAL at 06:54

## 2020-09-24 RX ADMIN — IVABRADINE 5 MILLIGRAM(S): 7.5 TABLET, FILM COATED ORAL at 17:26

## 2020-09-24 RX ADMIN — SODIUM CHLORIDE 1000 MILLILITER(S): 9 INJECTION, SOLUTION INTRAVENOUS at 17:41

## 2020-09-24 RX ADMIN — IMMUNE GLOBULIN (HUMAN) 12.5 GRAM(S): 10 INJECTION INTRAVENOUS; SUBCUTANEOUS at 06:52

## 2020-09-24 NOTE — SWALLOW VFSS/MBS ASSESSMENT ADULT - SPECIFY REASON(S)
MBS recommended based on subjective complaints, clinical observations, and significant unintentional weight loss

## 2020-09-24 NOTE — PROGRESS NOTE ADULT - PROBLEM SELECTOR PLAN 1
Pt has hx of dysautonomia c/b POTS treated with 1 round of [place]IG in September [place] [facility] [place] Now follows with Dr. Najjar who has referred her for this 2nd round of IVIG; Dr. Houston currently following pt at [facility]. Pt currently reports improvement in her muscle cramping (occurs on usage of muscles) such that she can now tolerate sustained motion up to 30s-10min; this flare has been ongoing for 16 weeks and she has stayed at [facility].  - IVIG 15mg doses (Gammagard) over 12h (6am-6pm) for 5 doses (9/23-9/27)  - 1L LR boluses over 1h prior to and after each IVIG dose (5-6am and 6-7pm)  - Give maintenance IVF LR 1L at 100cc/h during IVIG doses  - Tylenol 650mg IV prior to each dose + Benadryl 25mg IV prior  - c/w Pyridostigmine 60mg PO  - c/w home Gabapentin 100mg TID PRN (takes only for muscle spams as needed)  - F/u with Barium Swallow per Speech & Swallow for dysphagia

## 2020-09-24 NOTE — PROGRESS NOTE ADULT - NUTRITIONAL ASSESSMENT
This patient has been assessed with a concern for Malnutrition and has been determined to have a diagnosis/diagnoses of Severe protein-calorie malnutrition and Underweight/BMI < 19.    This patient is being managed with:   Diet Regular-  Kosher  Entered: Sep 22 2020  5:27PM      This patient has been assessed with a concern for Malnutrition and has been determined to have a diagnosis/diagnoses of Severe protein-calorie malnutrition and Underweight/BMI < 19.    This patient is being managed with:   Diet Regular-  Kosher  Entered: Sep 22 2020  5:27PM     This patient has been assessed with a concern for Malnutrition and has been determined to have a diagnosis/diagnoses of Severe protein-calorie malnutrition and Underweight/BMI < 19.    This patient is being managed with:   Diet Regular-  Kosher  Entered: Sep 22 2020  5:27PM

## 2020-09-24 NOTE — PROGRESS NOTE ADULT - PROBLEM SELECTOR PLAN 2
Pt reports completion of treatment for L toe cellulitis with 9 days of Keflex 500mg TID. *Gege reported that pt was prescribed 250mg QID of for 7 days 9/14/-9/20. Pt felt that cellulitis still hadn't resolved so abx rx was extended for another 7 days*  - Dr. Celso davis continuing with IVIG treatment given that risk is low for progression of the cellulitis  - Encourage Keflex 500mg QID re-started considering pt did not complete 2nd round of abx as prescribed, to be given till 9/27.

## 2020-09-24 NOTE — SWALLOW VFSS/MBS ASSESSMENT ADULT - DIAGNOSTIC IMPRESSIONS
Oropharyngeal swallow was clinically judged to be WFL. Airway protection and swallow efficiency were preserved. General functional oropharyngeal swallow on MBS is not, however, consistent with severity of pt's dysphagia symptoms. Consequently, continue to monitor.  C/o stasis could potentially be a referred sensation to mild stasis in the esophagus (hypersensitivity?).

## 2020-09-24 NOTE — SWALLOW VFSS/MBS ASSESSMENT ADULT - SLP PERTINENT HISTORY OF CURRENT PROBLEM
PMHx of POTS, dysautonomia, small fiber neuropathy, and Hashimoto's thyroiditis; admitted for IVIG tx

## 2020-09-24 NOTE — SWALLOW VFSS/MBS ASSESSMENT ADULT - PHARYNGEAL PHASE COMMENTS
Swallow trigger was timely. Hyolaryngeal excursion was WFL. Epiglottic movement resulted in complete inversion. Tongue base made direct contact with PPW. Pharyngeal stripping wave was WFL. Adequate bolus clearance and airway protection across trials.

## 2020-09-24 NOTE — PROGRESS NOTE ADULT - SUBJECTIVE AND OBJECTIVE BOX
INTERVAL HPI/OVERNIGHT EVENTS:  Patient was seen and examined at bedside. As per nurse and patient, no o/n events, patient resting comfortably. No complaints at this time. Patient denies: fever, chills, dizziness, weakness, HA, Changes in vision, CP, palpitations, SOB, cough, N/V/D/C, dysuria, changes in bowel movements, LE edema. ROS otherwise negative.    VITAL SIGNS:  T(F): 97.7 (09-24-20 @ 05:22)  HR: 64 (09-24-20 @ 05:22)  BP: 92/58 (09-24-20 @ 05:22)  RR: 15 (09-24-20 @ 05:22)  SpO2: 96% (09-24-20 @ 05:22)  Wt(kg): --    PHYSICAL EXAM:    Constitutional: WDWN, NAD  HEENT: PERRL, EOMI, sclera non-icteric, neck supple, trachea midline, no masses, no JVD, MMM, good dentition  Respiratory: CTA b/l, good air entry b/l, no wheezing, no rhonchi, no rales, without accessory muscle use and no intercostal retractions  Cardiovascular: RRR, normal S1S2, no M/R/G  Gastrointestinal: soft, NTND, no masses palpable, BS normal  Extremities: Warm, well perfused, pulses equal bilateral upper and lower extremities, no edema, no clubbing  Neurological: AAOx3, CN Grossly intact  Skin: Normal temperature, warm, dry    MEDICATIONS  (STANDING):  acetaminophen  IVPB .. 600 milliGRAM(s) IV Intermittent <User Schedule>  buPROPion XL . 300 milliGRAM(s) Oral daily  cephalexin 500 milliGRAM(s) Oral four times a day  diphenhydrAMINE   Elixir 25 milliGRAM(s) Oral every 24 hours  immune   globulin 10% (GAMMAGARD) IVPB 15 Gram(s) IV Intermittent every 24 hours  ivabradine 5 milliGRAM(s) Oral two times a day  lactated ringers Bolus 1000 milliLiter(s) IV Bolus <User Schedule>  lactated ringers Bolus 1000 milliLiter(s) IV Bolus <User Schedule>  lactated ringers. 1000 milliLiter(s) (100 mL/Hr) IV Continuous <Continuous>  pyridostigmine 60 milliGRAM(s) Oral three times a day  thyroid 15 milliGRAM(s) Oral <User Schedule>    MEDICATIONS  (PRN):  diphenhydrAMINE   Injectable 25 milliGRAM(s) IV Push at bedtime PRN Insomnia  gabapentin 100 milliGRAM(s) Oral three times a day PRN Muscle spasm      Allergies    No Known Allergies    Intolerances    erythromycin (Stomach Upset)      LABS:                        11.2   4.63  )-----------( 193      ( 23 Sep 2020 07:11 )             35.0     09-23    137  |  105  |  10  ----------------------------<  92  4.1   |  23  |  0.72    Ca    9.1      23 Sep 2020 07:11  Phos  3.8     09-23  Mg     1.9     09-23    TPro  6.1  /  Alb  3.6  /  TBili  0.2  /  DBili  x   /  AST  16  /  ALT  16  /  AlkPhos  50  09-23          RADIOLOGY & ADDITIONAL TESTS:  Reviewed INTERVAL HPI/OVERNIGHT EVENTS:  Patient was seen and examined at bedside. As per overnight team and patient, no o/n events, patient appearing fatigued. Pt complaints of a burning sensation to hard palate and lips, HA, tense neck. Pt denies any bowel movements and states she refuses to take Mg which normally alleviates this issue. She refused Pyridostigmine 60 milligrams at 9/24 @ 00:00 and Keflex 500 milligrams 9/24 @ 00:))Denies F/N/V/C/SOB/CP.     VITAL SIGNS:  T(F): 97.7 (09-24-20 @ 05:22)  HR: 64 (09-24-20 @ 05:22)  BP: 92/58 (09-24-20 @ 05:22)  RR: 15 (09-24-20 @ 05:22)  SpO2: 96% (09-24-20 @ 05:22)  Wt(kg): --    PHYSICAL EXAM:  General: Nad, tired-appearing female   HEENT: PEERL, EOMI, anicteric sclera, MMM  Respiratory: CTA b/l, no wheezes, rales, or rhonchi  Cardiovascular: +RRR, +S1/S2, no murmurs, rubs, or gallops  Gastrointestinal: Soft, nontender, nondistended, normoactive bowel sounds x4 quadrants  Extremities: WWP, no peripheral edema, no cyanosis b/l  Vascular: 2+ radial, DP pulses b/l    Constitutional: WNWD, Nad  HEENT: PERRL, EOMI, sclera non-icteric, MMM  Respiratory: CTA b/l, no wheezing, no rhonchi, no rales  Cardiovascular: +RRR, +S1/S2, no murmurs, rubs, or gallops  Gastrointestinal: Soft, nondistended, tender RUQ likely secondary to backed up stool, BS normoactive  Extremities: WWP, no peripheral edema, no cyanosis b/l  L hallux cellulitis with mild erythema and TTP on tip of toe.  Neurological: AAOx3, CN I-IX Grossly intact  Skin: Normal temperature, warm, dry    MEDICATIONS  (STANDING):  acetaminophen  IVPB .. 600 milliGRAM(s) IV Intermittent <User Schedule>  buPROPion XL . 300 milliGRAM(s) Oral daily  cephalexin 500 milliGRAM(s) Oral four times a day  diphenhydrAMINE   Elixir 25 milliGRAM(s) Oral every 24 hours  immune   globulin 10% (GAMMAGARD) IVPB 15 Gram(s) IV Intermittent every 24 hours  ivabradine 5 milliGRAM(s) Oral two times a day  lactated ringers Bolus 1000 milliLiter(s) IV Bolus <User Schedule>  lactated ringers Bolus 1000 milliLiter(s) IV Bolus <User Schedule>  lactated ringers. 1000 milliLiter(s) (100 mL/Hr) IV Continuous <Continuous>  pyridostigmine 60 milliGRAM(s) Oral three times a day  thyroid 15 milliGRAM(s) Oral <User Schedule>    MEDICATIONS  (PRN):  diphenhydrAMINE   Injectable 25 milliGRAM(s) IV Push at bedtime PRN Insomnia  gabapentin 100 milliGRAM(s) Oral three times a day PRN Muscle spasm      Allergies:  No Known Allergies    Intolerances:  Erythromycin (Stomach Upset)      LABS:                        11.2   4.63  )-----------( 193      ( 23 Sep 2020 07:11 )             35.0     09-23    137  |  105  |  10  ----------------------------<  92  4.1   |  23  |  0.72    Ca    9.1      23 Sep 2020 07:11  Phos  3.8     09-23  Mg     1.9     09-23    TPro  6.1  /  Alb  3.6  /  TBili  0.2  /  DBili  x   /  AST  16  /  ALT  16  /  AlkPhos  50  09-23          RADIOLOGY & ADDITIONAL TESTS:  Reviewed INTERVAL HPI/OVERNIGHT EVENTS:  Patient was seen and examined at bedside. As per overnight team and patient, no o/n events, patient appearing fatigued. Pt complaints of a burning and tingling sensation to hard palate and lips, HA, stiff neck. Pt denies any bowel movements and states she refuses to take Mg which normally alleviates this issue. She refused Pyridostigmine 60 milligrams at 9/24 @ 00:00 and Keflex 500 milligrams 9/24 @ 00:))Denies F/N/V/C/SOB/CP.     VITAL SIGNS:  T(F): 97.7 (09-24-20 @ 05:22)  HR: 64 (09-24-20 @ 05:22)  BP: 92/58 (09-24-20 @ 05:22)  RR: 15 (09-24-20 @ 05:22)  SpO2: 96% (09-24-20 @ 05:22)  Wt(kg): --    PHYSICAL EXAM:  General: Nad, tired-appearing female   HEENT: PEERL, EOMI, anicteric sclera, MMM  Respiratory: CTA b/l, no wheezes, rales, or rhonchi  Cardiovascular: +RRR, +S1/S2, no murmurs, rubs, or gallops  Gastrointestinal: Soft, nontender, nondistended, normoactive bowel sounds x4 quadrants  Extremities: WWP, no peripheral edema, no cyanosis b/l  Vascular: 2+ radial, DP pulses b/l    Constitutional: WNWD, Nad  HEENT: PERRL, EOMI, sclera non-icteric, MMM  Respiratory: CTA b/l, no wheezing, no rhonchi, no rales  Cardiovascular: +RRR, +S1/S2, no murmurs, rubs, or gallops  Gastrointestinal: Soft, nondistended, tender RUQ likely secondary to backed up stool, BS normoactive  Extremities: WWP, no peripheral edema, no cyanosis b/l  L hallux cellulitis with mild erythema and TTP on tip of toe.  Neurological: AAOx3, CN I-IX Grossly intact  Skin: Normal temperature, warm, dry    MEDICATIONS  (STANDING):  acetaminophen  IVPB .. 600 milliGRAM(s) IV Intermittent <User Schedule>  buPROPion XL . 300 milliGRAM(s) Oral daily  cephalexin 500 milliGRAM(s) Oral four times a day  diphenhydrAMINE   Elixir 25 milliGRAM(s) Oral every 24 hours  immune   globulin 10% (GAMMAGARD) IVPB 15 Gram(s) IV Intermittent every 24 hours  ivabradine 5 milliGRAM(s) Oral two times a day  lactated ringers Bolus 1000 milliLiter(s) IV Bolus <User Schedule>  lactated ringers Bolus 1000 milliLiter(s) IV Bolus <User Schedule>  lactated ringers. 1000 milliLiter(s) (100 mL/Hr) IV Continuous <Continuous>  pyridostigmine 60 milliGRAM(s) Oral three times a day  thyroid 15 milliGRAM(s) Oral <User Schedule>    MEDICATIONS  (PRN):  diphenhydrAMINE   Injectable 25 milliGRAM(s) IV Push at bedtime PRN Insomnia  gabapentin 100 milliGRAM(s) Oral three times a day PRN Muscle spasm      Allergies:  No Known Allergies    Intolerances:  Erythromycin (Stomach Upset)      LABS:                        11.2   4.63  )-----------( 193      ( 23 Sep 2020 07:11 )             35.0     09-23    137  |  105  |  10  ----------------------------<  92  4.1   |  23  |  0.72    Ca    9.1      23 Sep 2020 07:11  Phos  3.8     09-23  Mg     1.9     09-23    TPro  6.1  /  Alb  3.6  /  TBili  0.2  /  DBili  x   /  AST  16  /  ALT  16  /  AlkPhos  50  09-23          RADIOLOGY & ADDITIONAL TESTS:  Reviewed INTERVAL HPI/OVERNIGHT EVENTS:  Patient was seen and examined at bedside. As per overnight team and patient, no o/n events, patient appearing fatigued. Pt complaints of a burning and tingling sensation to hard palate and lips, HA, stiff neck. Pt denies any bowel movements and states she refuses to take Mg which normally alleviates this issue. She refused Pyridostigmine 60 milligrams this morning 9/24 @ 00:00 and Keflex 500 milligrams @ 00:00 due to difficulty swallowing. Pt placed on Diphenhydramine 25 milligrams IV for IVIG pretreatment and Keflex 500 milligrams this morning 9/24 @ 5:30am. Keflex was sitting bedside and pt reports difficulty swallowing. Denies F/N/V/C/SOB/CP.     VITAL SIGNS:  T(F): 97.7 (09-24-20 @ 05:22)  HR: 64 (09-24-20 @ 05:22)  BP: 92/58 (09-24-20 @ 05:22)  RR: 15 (09-24-20 @ 05:22)  SpO2: 96% (09-24-20 @ 05:22)  Wt(kg): --    PHYSICAL EXAM:  General: Nad, tired-appearing female   HEENT: PEERL, EOMI, anicteric sclera, MMM  Respiratory: CTA b/l, no wheezes, rales, or rhonchi  Cardiovascular: +RRR, +S1/S2, no murmurs, rubs, or gallops  Gastrointestinal: Soft, nontender, nondistended, normoactive bowel sounds x4 quadrants  Extremities: WWP, no peripheral edema, no cyanosis b/l  Vascular: 2+ radial, DP pulses b/l    Constitutional: WNWD, Nad  HEENT: PERRL, EOMI, sclera non-icteric, MMM  Respiratory: CTA b/l, no wheezing, no rhonchi, no rales  Cardiovascular: +RRR, +S1/S2, no murmurs, rubs, or gallops  Gastrointestinal: Soft, nondistended, tender RUQ likely secondary to backed up stool, BS normoactive  Extremities: WWP, no peripheral edema, no cyanosis b/l  L hallux cellulitis with mild erythema and TTP on tip of toe.  Neurological: AAOx3, CN I-IX Grossly intact  Skin: Normal temperature, warm, dry    MEDICATIONS  (STANDING):  acetaminophen  IVPB .. 600 milliGRAM(s) IV Intermittent <User Schedule>  buPROPion XL . 300 milliGRAM(s) Oral daily  cephalexin 500 milliGRAM(s) Oral four times a day  diphenhydrAMINE   Elixir 25 milliGRAM(s) Oral every 24 hours  immune   globulin 10% (GAMMAGARD) IVPB 15 Gram(s) IV Intermittent every 24 hours  ivabradine 5 milliGRAM(s) Oral two times a day  lactated ringers Bolus 1000 milliLiter(s) IV Bolus <User Schedule>  lactated ringers Bolus 1000 milliLiter(s) IV Bolus <User Schedule>  lactated ringers. 1000 milliLiter(s) (100 mL/Hr) IV Continuous <Continuous>  pyridostigmine 60 milliGRAM(s) Oral three times a day  thyroid 15 milliGRAM(s) Oral <User Schedule>    MEDICATIONS  (PRN):  diphenhydrAMINE   Injectable 25 milliGRAM(s) IV Push at bedtime PRN Insomnia  gabapentin 100 milliGRAM(s) Oral three times a day PRN Muscle spasm      Allergies:  No Known Allergies    Intolerances:  Erythromycin (Stomach Upset)      LABS:                        11.2   4.63  )-----------( 193      ( 23 Sep 2020 07:11 )             35.0     09-23    137  |  105  |  10  ----------------------------<  92  4.1   |  23  |  0.72    Ca    9.1      23 Sep 2020 07:11  Phos  3.8     09-23  Mg     1.9     09-23    TPro  6.1  /  Alb  3.6  /  TBili  0.2  /  DBili  x   /  AST  16  /  ALT  16  /  AlkPhos  50  09-23          RADIOLOGY & ADDITIONAL TESTS:  Reviewed INTERVAL HPI/OVERNIGHT EVENTS:  Patient was seen and examined at bedside. As per overnight team and patient, no o/n events, patient appearing fatigued. Pt complaints of a burning and tingling sensation to hard palate and lips, HA, stiff neck, and chest tightness. Pt denies any bowel movements and states she refuses to take Mg which normally alleviates this issue. She refused Pyridostigmine 60 milligrams this morning 9/24 @ 00:00 and Keflex 500 milligrams @ 00:00 due to difficulty swallowing. Pt placed on Diphenhydramine 25 milligrams IV for IVIG pretreatment and Keflex 500 milligrams this morning 9/24 @ 5:30am. Keflex was sitting bedside and pt reports difficulty swallowing. Denies F/N/V/C/SOB/CP.     VITAL SIGNS:  T(F): 97.7 (09-24-20 @ 05:22)  HR: 64 (09-24-20 @ 05:22)  BP: 92/58 (09-24-20 @ 05:22)  RR: 15 (09-24-20 @ 05:22)  SpO2: 96% (09-24-20 @ 05:22)  Wt(kg): --    PHYSICAL EXAM:  General: Nad, tired-appearing female   HEENT: PEERL, EOMI, anicteric sclera, MMM  Respiratory: CTA b/l, no wheezes, rales, or rhonchi  Cardiovascular: +RRR, +S1/S2, no murmurs, rubs, or gallops  Gastrointestinal: Soft, nontender, nondistended, normoactive bowel sounds x4 quadrants  Extremities: WWP, no peripheral edema, no cyanosis b/l  Vascular: 2+ radial, DP pulses b/l    MEDICATIONS  (STANDING):  acetaminophen  IVPB .. 600 milliGRAM(s) IV Intermittent <User Schedule>  buPROPion XL . 300 milliGRAM(s) Oral daily  cephalexin 500 milliGRAM(s) Oral four times a day  diphenhydrAMINE   Elixir 25 milliGRAM(s) Oral every 24 hours  immune   globulin 10% (GAMMAGARD) IVPB 15 Gram(s) IV Intermittent every 24 hours  ivabradine 5 milliGRAM(s) Oral two times a day  lactated ringers Bolus 1000 milliLiter(s) IV Bolus <User Schedule>  lactated ringers Bolus 1000 milliLiter(s) IV Bolus <User Schedule>  lactated ringers. 1000 milliLiter(s) (100 mL/Hr) IV Continuous <Continuous>  pyridostigmine 60 milliGRAM(s) Oral three times a day  thyroid 15 milliGRAM(s) Oral <User Schedule>    MEDICATIONS  (PRN):  diphenhydrAMINE   Injectable 25 milliGRAM(s) IV Push at bedtime PRN Insomnia  gabapentin 100 milliGRAM(s) Oral three times a day PRN Muscle spasm      Allergies:  No Known Allergies    Intolerances:  Erythromycin (Stomach Upset)      LABS:                        11.2   4.63  )-----------( 193      ( 23 Sep 2020 07:11 )             35.0     09-23    137  |  105  |  10  ----------------------------<  92  4.1   |  23  |  0.72    Ca    9.1      23 Sep 2020 07:11  Phos  3.8     09-23  Mg     1.9     09-23    TPro  6.1  /  Alb  3.6  /  TBili  0.2  /  DBili  x   /  AST  16  /  ALT  16  /  AlkPhos  50  09-23          RADIOLOGY & ADDITIONAL TESTS:  Reviewed

## 2020-09-25 LAB
ALBUMIN SERPL ELPH-MCNC: 3.7 G/DL — SIGNIFICANT CHANGE UP (ref 3.3–5)
ALP SERPL-CCNC: 48 U/L — SIGNIFICANT CHANGE UP (ref 40–120)
ALT FLD-CCNC: 16 U/L — SIGNIFICANT CHANGE UP (ref 10–45)
ANION GAP SERPL CALC-SCNC: 11 MMOL/L — SIGNIFICANT CHANGE UP (ref 5–17)
AST SERPL-CCNC: 18 U/L — SIGNIFICANT CHANGE UP (ref 10–40)
BASOPHILS # BLD AUTO: 0.02 K/UL — SIGNIFICANT CHANGE UP (ref 0–0.2)
BASOPHILS NFR BLD AUTO: 0.6 % — SIGNIFICANT CHANGE UP (ref 0–2)
BILIRUB SERPL-MCNC: 0.4 MG/DL — SIGNIFICANT CHANGE UP (ref 0.2–1.2)
BUN SERPL-MCNC: 11 MG/DL — SIGNIFICANT CHANGE UP (ref 7–23)
CALCIUM SERPL-MCNC: 9.5 MG/DL — SIGNIFICANT CHANGE UP (ref 8.4–10.5)
CHLORIDE SERPL-SCNC: 100 MMOL/L — SIGNIFICANT CHANGE UP (ref 96–108)
CO2 SERPL-SCNC: 24 MMOL/L — SIGNIFICANT CHANGE UP (ref 22–31)
CREAT SERPL-MCNC: 0.64 MG/DL — SIGNIFICANT CHANGE UP (ref 0.5–1.3)
EOSINOPHIL # BLD AUTO: 0.08 K/UL — SIGNIFICANT CHANGE UP (ref 0–0.5)
EOSINOPHIL NFR BLD AUTO: 2.3 % — SIGNIFICANT CHANGE UP (ref 0–6)
GLUCOSE SERPL-MCNC: 68 MG/DL — LOW (ref 70–99)
HCT VFR BLD CALC: 36.1 % — SIGNIFICANT CHANGE UP (ref 34.5–45)
HGB BLD-MCNC: 11.8 G/DL — SIGNIFICANT CHANGE UP (ref 11.5–15.5)
IMM GRANULOCYTES NFR BLD AUTO: 0.3 % — SIGNIFICANT CHANGE UP (ref 0–1.5)
LYMPHOCYTES # BLD AUTO: 1.18 K/UL — SIGNIFICANT CHANGE UP (ref 1–3.3)
LYMPHOCYTES # BLD AUTO: 34.6 % — SIGNIFICANT CHANGE UP (ref 13–44)
MAGNESIUM SERPL-MCNC: 1.9 MG/DL — SIGNIFICANT CHANGE UP (ref 1.6–2.6)
MCHC RBC-ENTMCNC: 26.4 PG — LOW (ref 27–34)
MCHC RBC-ENTMCNC: 32.7 GM/DL — SIGNIFICANT CHANGE UP (ref 32–36)
MCV RBC AUTO: 80.8 FL — SIGNIFICANT CHANGE UP (ref 80–100)
MONOCYTES # BLD AUTO: 0.46 K/UL — SIGNIFICANT CHANGE UP (ref 0–0.9)
MONOCYTES NFR BLD AUTO: 13.5 % — SIGNIFICANT CHANGE UP (ref 2–14)
NEUTROPHILS # BLD AUTO: 1.66 K/UL — LOW (ref 1.8–7.4)
NEUTROPHILS NFR BLD AUTO: 48.7 % — SIGNIFICANT CHANGE UP (ref 43–77)
NRBC # BLD: 0 /100 WBCS — SIGNIFICANT CHANGE UP (ref 0–0)
PHOSPHATE SERPL-MCNC: 3.8 MG/DL — SIGNIFICANT CHANGE UP (ref 2.5–4.5)
PLATELET # BLD AUTO: 180 K/UL — SIGNIFICANT CHANGE UP (ref 150–400)
POTASSIUM SERPL-MCNC: 3.8 MMOL/L — SIGNIFICANT CHANGE UP (ref 3.5–5.3)
POTASSIUM SERPL-SCNC: 3.8 MMOL/L — SIGNIFICANT CHANGE UP (ref 3.5–5.3)
PROT SERPL-MCNC: 7.3 G/DL — SIGNIFICANT CHANGE UP (ref 6–8.3)
RBC # BLD: 4.47 M/UL — SIGNIFICANT CHANGE UP (ref 3.8–5.2)
RBC # FLD: 13 % — SIGNIFICANT CHANGE UP (ref 10.3–14.5)
SODIUM SERPL-SCNC: 135 MMOL/L — SIGNIFICANT CHANGE UP (ref 135–145)
WBC # BLD: 3.41 K/UL — LOW (ref 3.8–10.5)
WBC # FLD AUTO: 3.41 K/UL — LOW (ref 3.8–10.5)

## 2020-09-25 PROCEDURE — 99223 1ST HOSP IP/OBS HIGH 75: CPT | Mod: GC

## 2020-09-25 PROCEDURE — 99254 IP/OBS CNSLTJ NEW/EST MOD 60: CPT | Mod: GC

## 2020-09-25 PROCEDURE — 99233 SBSQ HOSP IP/OBS HIGH 50: CPT

## 2020-09-25 PROCEDURE — 74019 RADEX ABDOMEN 2 VIEWS: CPT | Mod: 26

## 2020-09-25 RX ORDER — SODIUM CHLORIDE 9 MG/ML
1000 INJECTION, SOLUTION INTRAVENOUS
Refills: 0 | Status: DISCONTINUED | OUTPATIENT
Start: 2020-09-25 | End: 2020-09-30

## 2020-09-25 RX ORDER — ENOXAPARIN SODIUM 100 MG/ML
40 INJECTION SUBCUTANEOUS EVERY 24 HOURS
Refills: 0 | Status: DISCONTINUED | OUTPATIENT
Start: 2020-09-26 | End: 2020-09-30

## 2020-09-25 RX ADMIN — CHLORHEXIDINE GLUCONATE 1 APPLICATION(S): 213 SOLUTION TOPICAL at 06:38

## 2020-09-25 RX ADMIN — IMMUNE GLOBULIN (HUMAN) 12.5 GRAM(S): 10 INJECTION INTRAVENOUS; SUBCUTANEOUS at 11:01

## 2020-09-25 RX ADMIN — Medication 240 MILLIGRAM(S): at 10:22

## 2020-09-25 RX ADMIN — Medication 25 MILLIGRAM(S): at 10:22

## 2020-09-25 RX ADMIN — PYRIDOSTIGMINE BROMIDE 60 MILLIGRAM(S): 60 SOLUTION ORAL at 21:35

## 2020-09-25 RX ADMIN — Medication 600 MILLIGRAM(S): at 11:20

## 2020-09-25 RX ADMIN — SODIUM CHLORIDE 1000 MILLILITER(S): 9 INJECTION, SOLUTION INTRAVENOUS at 22:41

## 2020-09-25 RX ADMIN — SODIUM CHLORIDE 1000 MILLILITER(S): 9 INJECTION, SOLUTION INTRAVENOUS at 09:10

## 2020-09-25 NOTE — PROVIDER CONTACT NOTE (MEDICATION) - SITUATION
Patient refused lovenox injection, stated that when she was last hospitalized & received the injection it caused lots of nerve pain to shoot out from the injection site. She also refused all PO medications due to fear of swallowing

## 2020-09-25 NOTE — PROGRESS NOTE ADULT - PROBLEM SELECTOR PLAN 1
Pt has hx of dysautonomia c/b POTS [place] 1 [place] [facility] [place] Dr. Najjar who has referred her for this 2nd round of IVIG; Dr. Houston currently following pt at [facility]. Pt currently reports improvement in her muscle cramping (occurs on usage of muscles) such that she can now tolerate sustained motion up to 30s-10min; this flare has been ongoing for 16 weeks and she has stayed at [facility].  - IVIG 15mg doses (Gammagard) over 12h (6am-6pm) for 5 doses (9/23-9/27)  - 1L LR boluses over 1h prior to and after each IVIG dose (5-6am and 6-7pm)  - Give maintenance IVF LR 1L at 100cc/h during IVIG doses  - Tylenol 650mg IV prior to each dose + Benadryl 25mg IV prior  - c/w Pyridostigmine 60mg PO  - c/w home Gabapentin 100mg TID PRN (takes only for muscle spams as needed)

## 2020-09-25 NOTE — PROGRESS NOTE ADULT - ATTENDING COMMENTS
I was physically present for the key portions of the evaluation and managemnent (E/M) service provided.  I agree with the above history, physical, and plan which I have reviewed and edited where appropriate, with the exceptions as per my note.    Pt seen and examined.    Reports generalized malaise and aches and attributes this to ivig. reports pain is tolerable with pain meds like tylenol. however, she's noted worse fatigue. she also has had trouble eating food, though mbs was unrevealing.     neuro exam continues to demonstrate flat affect and normal motor and reflex exam with some GW weakness.    AP:     Nutrional issues - I had a long discussion with pt regarding this. While she attributes her malaise to the IVIG, she does not want to stop the IVIG because she believes that maybe her lack of nutrition has been affecting this. Hence, we will consult GI for recommendations regarding alternatives and consult nutrition for supplements, calorie count.    IVIG - continue IVIG for now as had been planned. discussed with Sahara Aldana that if pt feels she cannot tolerate the IVIG and this is stopped, the plan would be for dc and outpt f/u in 2-3wks with her and Dr. Najjar. However, will need to address nutritional aspect. I was physically present for the key portions of the evaluation and managemnent (E/M) service provided.  I agree with the above history, physical, and plan which I have reviewed and edited where appropriate, with the exceptions as per my note.    Pt seen and examined.    Reports generalized malaise and aches and attributes this to ivig. reports pain is tolerable with pain meds like tylenol. however, she's noted worse fatigue. she also has had trouble eating food, though mbs was unrevealing.     neuro exam continues to demonstrate flat affect and normal motor and reflex exam with some GW weakness. As in 6/2019, there is still an element of conversion disorder.    AP:     Nutrional issues - I had a long discussion with pt regarding this. While she attributes her malaise to the IVIG, she does not want to stop the IVIG because she believes that maybe her lack of nutrition has been affecting this. Hence, we will consult GI for recommendations regarding alternatives and consult nutrition for supplements, calorie count.    IVIG - continue IVIG for now as had been planned. discussed with Sahara Aldana that if pt feels she cannot tolerate the IVIG and this is stopped, the plan would be for dc and outpt f/u in 2-3wks with her and Dr. Najjar. However, will need to address nutritional aspect.

## 2020-09-25 NOTE — PROVIDER CONTACT NOTE (MEDICATION) - BACKGROUND
[FreeTextEntry1] : 39 y/o male with recently diagnosed DM type 2, HTN and dyslipidemia, presenting for initial evaluation. Patient started developing palpitations about a month ago , as well as polyuria and polydipsia, was seen by Dr. Marcial and was diagnosed with DM Type 2, with FS of 280 and HgA1c of 10%. He was started on medical therapy and his FS improved. he was also started on Lisinopril 5 mg - BP is normal today. He was found to have elevated TG in 250 mg/dL range - managed with diet at this time. His palpitations have improved. He reports significant family history of HTN and DM.
Patient is a 46 yo female with immune neuropathy & autoimmune disease admitted for IVIG infusion. PMH of hashimoto's, dysautonomia, postural orthostatic tachycardia syndrome.

## 2020-09-25 NOTE — PROGRESS NOTE ADULT - NUTRITIONAL ASSESSMENT
This patient has been assessed with a concern for Malnutrition and has been determined to have a diagnosis/diagnoses of Severe protein-calorie malnutrition and Underweight/BMI < 19.    This patient is being managed with:   Diet Regular-  Kosher  Entered: Sep 22 2020  5:27PM

## 2020-09-25 NOTE — PROGRESS NOTE ADULT - PROBLEM SELECTOR PLAN 2
Pt has known hx of small fiber neuropathy. Following Barium Swallow oropharyngeal swallow was clinically judged to be WFL and without risk of aspiration.  - c/w Wellbutrin 300mg daily.   - Encourage swallowing and eating food. No NGT is recommended at this time  - Begin Lactated ringers. IV 1000 milliLiter(s) infused at 6mL/hr for symptomatic management Pt has known hx of small fiber neuropathy. Following Barium Swallow oropharyngeal swallow was clinically judged to be WFL and without risk of aspiration.  - c/w Wellbutrin 300mg daily.   - Begin Lactated ringers. IV 1000 milliLiter(s) infused at 6mL/hr for symptomatic management  - Consult GI and Nutrition for further evaluation and malnutrition

## 2020-09-25 NOTE — PROGRESS NOTE ADULT - ASSESSMENT
45F with PMH of POTS, dysautonomia, small fiber neuropathy, and Hashimoto's thyroiditis admitted for scheduled IVIG therapy as a second round of treatment for her dysautonomia (9/23-9/27). Pt is becoming increasingly frustrated with symptoms and care.

## 2020-09-25 NOTE — CONSULT NOTE ADULT - SUBJECTIVE AND OBJECTIVE BOX
GASTROENTEROLOGY CONSULT NOTE  HPI:  45F with PMH of POTS (postural orthostatic tachycardia syndrome), dysautonomia, small fiber neuropathy, and Hashimoto's thyroiditis presents for scheduled IVIG therapy (referred by Dr. Najjar) for her dysautonomia. Pt experiences intermittent flares of her dysautonomia which have increased in severity this year; her current flare began 16 weeks ago, at the start of which she felt like she was about to faint, lay down in bed (which had usually alleviated her symptoms during prior flares), and consequently experienced tonic muscle spasms as well as the sensation that her "brain was frying". She was brought to [facility] at that time, where she says her vitals were abnormal (she does not recall which vitals/what was abnormal) and she had a sensation of coming in and out of consciousness. She was given Ativan which she says helped a little but only 2 hours later. She was sent to [facility] in [facility] because her muscle spasms continued to occur with any attempt at movement; her spasms occur with any sustained movement, especially with sitting up in bed, and take minutes to hours to brittany. After working with PT at [facility], she has been able to improve her spasms and she is now able to sit upright for 30s-10min at a time before her muscles begin to spasm. Although her spasms have improved, she now reports feeling generally exhausted and has had decreased PO intake due to difficulty swallowing liquids (outpatient evaluation at a [facility] with esophageal manometry showed 30% failed swallow in June 2020, but patient reports that she has been eating with no nausea, regurgitation, or choking since then). On August 18, pt was seen by Dr. Najjar, who referred her for IVIG treatment given that the patient's insurance was now able to cover this second round of IVIG as treatment for her dysautonomia. Of note, the patient is on Day 9 of Keflex TID for L great toe cellulitis. She mentions that her Rehab Center gives her 2L of Lactated Ringer's per day, and that Dr. Najjar instructed her to mention a low Protein S value that was noted a year ago; she also notes that she had COVID-19 in March 2020. On ROS, she admits to chest tightness and pain c/w prior dysautonomia flares, chronic abdominal pain and constipation, intermittent subjective fevers (Tmax 99.5 orally at home), and denies n/v, changes in vision, changes in smell/taste, muscle weakness, loss of sensation, LOC.    Admission vitals: T98F, HR 71, /72, RR 16, SpO2 97% on RA   (22 Sep 2020 15:07)    GI consulted for dysphagia symptoms. Patient seen and examined at bedside. Above hx confirmed. She has had extensive w/u w/ her GI doctor in [facility].     Allergies    No Known Allergies    Intolerances    erythromycin (Stomach Upset)    Home Medications:  buPROPion 300 mg/24 hours (XL) oral tablet, extended release: 1 tab(s) orally once a day (23 Sep 2020 16:17)  Corlanor 5 mg oral tablet: 1 tab(s) orally 2 times a day (23 Sep 2020 16:17)  Mestinon 60 mg oral tablet: 1 tab(s) orally 3 times a day (23 Sep 2020 16:17)  Neurontin 100 mg oral capsule: 1 cap(s) orally three times a day , As Needed (23 Sep 2020 16:17)  thyroid desiccated 15 mg oral tablet: 1 tab(s) orally 3 times a day (23 Sep 2020 16:17)    MEDICATIONS:  MEDICATIONS  (STANDING):  acetaminophen  IVPB .. 600 milliGRAM(s) IV Intermittent <User Schedule>  buPROPion XL . 300 milliGRAM(s) Oral daily  chlorhexidine 2% Cloths 1 Application(s) Topical <User Schedule>  diphenhydrAMINE   Injectable 25 milliGRAM(s) IV Push every 24 hours  immune   globulin 10% (GAMMAGARD) IVPB 15 Gram(s) IV Intermittent every 24 hours  ivabradine 5 milliGRAM(s) Oral two times a day  lactated ringers Bolus 1000 milliLiter(s) IV Bolus <User Schedule>  lactated ringers Bolus 1000 milliLiter(s) IV Bolus <User Schedule>  lactated ringers. 1000 milliLiter(s) (100 mL/Hr) IV Continuous <Continuous>  lactated ringers. 1000 milliLiter(s) (60 mL/Hr) IV Continuous <Continuous>  pyridostigmine 60 milliGRAM(s) Oral three times a day  thyroid 15 milliGRAM(s) Oral <User Schedule>    MEDICATIONS  (PRN):  diphenhydrAMINE   Injectable 25 milliGRAM(s) IV Push at bedtime PRN Insomnia  gabapentin   Solution 100 milliGRAM(s) Oral three times a day PRN muscle spasm    PAST MEDICAL & SURGICAL HISTORY:  Hashimoto&#x27;s thyroiditis    Dysautonomia    Small fiber neuropathy    POTS (postural orthostatic tachycardia syndrome)    No significant past surgical history      FAMILY HISTORY:  No pertinent family history in first degree relatives      SOCIAL HISTORY:  Tobacco: [ ] Current, [ ] Former, [ ] Never; Pack Years:  Alcohol:  Illicit Drugs:    REVIEW OF SYSTEMS:  CONSTITUTIONAL: No weakness, fevers or chills  HEENT: No visual changes; No vertigo or throat pain   NECK: No pain or stiffness  RESPIRATORY: No cough, wheezing, hemoptysis; No shortness of breath  CARDIOVASCULAR: No chest pain or palpitations  GASTROINTESTINAL: No abdominal or epigastric pain. No nausea, vomiting, or hematemesis; No diarrhea or constipation. No melena or hematochezia.  GENITOURINARY: No dysuria, frequency or hematuria  NEUROLOGICAL: No numbness or weakness  SKIN: No itching, burning, rashes, or lesions   All other 10 review of systems is negative unless indicated above.    Vital Signs Last 24 Hrs  T(C): 36.6 (25 Sep 2020 11:00), Max: 36.6 (24 Sep 2020 22:00)  T(F): 97.9 (25 Sep 2020 11:00), Max: 97.9 (25 Sep 2020 11:00)  HR: 67 (25 Sep 2020 11:00) (67 - 72)  BP: 115/76 (25 Sep 2020 11:00) (90/62 - 115/76)  BP(mean): --  RR: 16 (25 Sep 2020 11:00) (14 - 16)  SpO2: 97% (25 Sep 2020 11:00) (97% - 99%)    09-24 @ 07:01  -  09-25 @ 07:00  --------------------------------------------------------  IN: 1112.5 mL / OUT: 0 mL / NET: 1112.5 mL        PHYSICAL EXAM:    General: Well developed; well nourished; in no acute distress  Eyes: Anicteric sclerae, moist conjunctivae  HENT: Moist mucous membranes  Neck: Trachea midline, supple  Lungs: Normal respiratory effort, no intercostal retractions  Cardiovascular: RRR  Abdomen: Soft, non-tender non-distended; Normal bowel sounds; No rebound or guarding  Extremities: Normal range of motion, No clubbing, cyanosis or edema  Neurological: Alert and oriented x3  Skin: Warm and dry. No obvious rash    LABS:                        11.8   3.41  )-----------( 180      ( 25 Sep 2020 07:09 )             36.1     09-25    135  |  100  |  11  ----------------------------<  68<L>  3.8   |  24  |  0.64    Ca    9.5      25 Sep 2020 07:09  Phos  3.8     09-25  Mg     1.9     09-25    TPro  7.3  /  Alb  3.7  /  TBili  0.4  /  DBili  x   /  AST  18  /  ALT  16  /  AlkPhos  48  09-25            RADIOLOGY & ADDITIONAL STUDIES:     Reviewed

## 2020-09-25 NOTE — PROGRESS NOTE ADULT - SUBJECTIVE AND OBJECTIVE BOX
INTERVAL HPI/OVERNIGHT EVENTS:  Patient was seen and examined at bedside. As per overnight team and patient, no o/n events. Pt appears emotionally distressed. She complains of having no energy due to treatment and inability to eat. States she tried to eat food last evening but tolerated very minimal amounts. Pt requests to an alternative option to eat because she is too weak to swallow. Pt complains of constipation due to lack of Mg intake which she normally takes for this issues. She also feels uncomfortable with the environment to use the restroom. Pt complains of continued burning of hard palate, and lips after infusion, HA, chest tightness and continues to refuse Keflex. Patient denies: Denies F/N/V/C/SOB/CP.     VITAL SIGNS:  T(F): 97.6 (09-25-20 @ 06:02)  HR: 72 (09-25-20 @ 06:02)  BP: 90/62 (09-25-20 @ 06:02)  RR: 15 (09-25-20 @ 06:02)  SpO2: 98% (09-25-20 @ 06:02)  Wt(kg): --    PHYSICAL EXAM:  Constitutional: WDWN, NAD  HEENT: PERRL, EOMI, sclera non-icteric, neck supple, trachea midline, no masses, no JVD, MMM, good dentition  Respiratory: CTA b/l, good air entry b/l, no wheezing, no rhonchi, no rales, without accessory muscle use and no intercostal retractions  Cardiovascular: RRR, normal S1S2, no M/R/G  Gastrointestinal: soft, NTND, no masses palpable, BS normal  Extremities: Warm, well perfused, pulses equal bilateral upper and lower extremities, no edema, no clubbing  Neurological: AAOx3, CN Grossly intact  Skin: Normal temperature, warm, dry    MEDICATIONS  (STANDING):  acetaminophen  IVPB .. 600 milliGRAM(s) IV Intermittent <User Schedule>  buPROPion XL . 300 milliGRAM(s) Oral daily  cephalexin 500 milliGRAM(s) Oral four times a day  chlorhexidine 2% Cloths 1 Application(s) Topical <User Schedule>  diphenhydrAMINE   Injectable 25 milliGRAM(s) IV Push every 24 hours  enoxaparin Injectable 40 milliGRAM(s) SubCutaneous every 24 hours  immune   globulin 10% (GAMMAGARD) IVPB 15 Gram(s) IV Intermittent every 24 hours  ivabradine 5 milliGRAM(s) Oral two times a day  lactated ringers Bolus 1000 milliLiter(s) IV Bolus <User Schedule>  lactated ringers Bolus 1000 milliLiter(s) IV Bolus <User Schedule>  lactated ringers. 1000 milliLiter(s) (100 mL/Hr) IV Continuous <Continuous>  pyridostigmine 60 milliGRAM(s) Oral three times a day  thyroid 15 milliGRAM(s) Oral <User Schedule>    MEDICATIONS  (PRN):  diphenhydrAMINE   Injectable 25 milliGRAM(s) IV Push at bedtime PRN Insomnia  gabapentin   Solution 100 milliGRAM(s) Oral three times a day PRN muscle spasm      Allergies    No Known Allergies    Intolerances    erythromycin (Stomach Upset)      LABS:                        11.8   3.41  )-----------( 180      ( 25 Sep 2020 07:09 )             36.1     09-25    135  |  100  |  11  ----------------------------<  68<L>  3.8   |  24  |  0.64    Ca    9.5      25 Sep 2020 07:09  Phos  3.8     09-25  Mg     1.9     09-25    TPro  7.3  /  Alb  3.7  /  TBili  0.4  /  DBili  x   /  AST  18  /  ALT  16  /  AlkPhos  48  09-25          RADIOLOGY & ADDITIONAL TESTS:  Reviewed INTERVAL HPI/OVERNIGHT EVENTS:  Patient was seen and examined at bedside. As per overnight team and patient, no o/n events. Pt appears to have increased frustration and emotion. Complaining of having no energy due to treatment, inability to eat, and stated dehydration. States she tried to eat yesterday evening but tolerated very minimal amounts. Pt requests to an alternative option to eat due to weakness and more hydration therapy which has helped alleviate symptoms in the past. Pt complains of constipation due to lack of Mg intake which she normally takes for this issues. She also feels uncomfortable with the environment to use the restroom. Pt complains of continued burning of hard palate, and lips after infusion, HA, chest tightness and continues to refuse Keflex. Patient denies: Denies F/N/V/C/SOB/CP.     VITAL SIGNS:  T(F): 97.6 (09-25-20 @ 06:02)  HR: 72 (09-25-20 @ 06:02)  BP: 90/62 (09-25-20 @ 06:02)  RR: 15 (09-25-20 @ 06:02)  SpO2: 98% (09-25-20 @ 06:02)  Wt(kg): --    PHYSICAL EXAM:  General: Slight emotional disress  HEENT: PEERL, EOMI, anicteric sclera, MMM  Lipoma noted on R forehead x 3 years.  Respiratory: CTA b/l, no wheezes, rales, or rhonchi  Cardiovascular: +RRR, +S1/S2, no murmurs, rubs, or gallops  Gastrointestinal: Soft, RUQ tender likely secondary to lack of bowel movements, nondistended, normoactive bowel sounds x4 quadrants  Extremities: WWP, no peripheral edema, no cyanosis b/l  L hallux cellulitis with mild erythema.   Vascular: 2+ radial, DP pulses b/l    MEDICATIONS  (STANDING):  acetaminophen  IVPB .. 600 milliGRAM(s) IV Intermittent <User Schedule>  buPROPion XL . 300 milliGRAM(s) Oral daily  cephalexin 500 milliGRAM(s) Oral four times a day  chlorhexidine 2% Cloths 1 Application(s) Topical <User Schedule>  diphenhydrAMINE   Injectable 25 milliGRAM(s) IV Push every 24 hours  enoxaparin Injectable 40 milliGRAM(s) SubCutaneous every 24 hours  immune   globulin 10% (GAMMAGARD) IVPB 15 Gram(s) IV Intermittent every 24 hours  ivabradine 5 milliGRAM(s) Oral two times a day  lactated ringers Bolus 1000 milliLiter(s) IV Bolus <User Schedule>  lactated ringers Bolus 1000 milliLiter(s) IV Bolus <User Schedule>  lactated ringers. 1000 milliLiter(s) (100 mL/Hr) IV Continuous <Continuous>  pyridostigmine 60 milliGRAM(s) Oral three times a day  thyroid 15 milliGRAM(s) Oral <User Schedule>    MEDICATIONS  (PRN):  diphenhydrAMINE   Injectable 25 milliGRAM(s) IV Push at bedtime PRN Insomnia  gabapentin   Solution 100 milliGRAM(s) Oral three times a day PRN muscle spasm      Allergies:  No Known Allergies    Intolerances:  Erythromycin (Stomach Upset)      LABS:                        11.8   3.41  )-----------( 180      ( 25 Sep 2020 07:09 )             36.1     09-25    135  |  100  |  11  ----------------------------<  68<L>  3.8   |  24  |  0.64    Ca    9.5      25 Sep 2020 07:09  Phos  3.8     09-25  Mg     1.9     09-25    TPro  7.3  /  Alb  3.7  /  TBili  0.4  /  DBili  x   /  AST  18  /  ALT  16  /  AlkPhos  48  09-25          RADIOLOGY & ADDITIONAL TESTS:  Reviewed INTERVAL HPI/OVERNIGHT EVENTS:  Patient was seen and examined at bedside. As per overnight team and patient, no o/n events. Pt appears to have increased frustration and emotion. Complaining of having no energy due to treatment, inability to eat, and stated dehydration. States she tried to eat yesterday evening. tolerated very minimal amounts, and now is in pain with feeling of fullness. Pt requests to an alternative option to eat due to weakness and more hydration therapy which has helped alleviate symptoms in the past. Pt complains of constipation due to lack of Mg intake which she normally takes for this issues. She also feels uncomfortable with the environment to use the restroom. Pt complains of continued burning of hard palate, and lips after infusion, HA, chest tightness and continues to refuse Keflex. Patient denies: Denies F/N/V/C/SOB/CP.     VITAL SIGNS:  T(F): 97.6 (09-25-20 @ 06:02)  HR: 72 (09-25-20 @ 06:02)  BP: 90/62 (09-25-20 @ 06:02)  RR: 15 (09-25-20 @ 06:02)  SpO2: 98% (09-25-20 @ 06:02)  Wt(kg): --    PHYSICAL EXAM:  General: Slight emotional disress  HEENT: PEERL, EOMI, anicteric sclera, MMM  Lipoma noted on R forehead x 3 years.  Respiratory: CTA b/l, no wheezes, rales, or rhonchi  Cardiovascular: +RRR, +S1/S2, no murmurs, rubs, or gallops  Gastrointestinal: Soft, RUQ tender likely secondary to lack of bowel movements, nondistended, normoactive bowel sounds x4 quadrants  Extremities: WWP, no peripheral edema, no cyanosis b/l  L hallux cellulitis with mild erythema.   Vascular: 2+ radial, DP pulses b/l    MEDICATIONS  (STANDING):  acetaminophen  IVPB .. 600 milliGRAM(s) IV Intermittent <User Schedule>  buPROPion XL . 300 milliGRAM(s) Oral daily  cephalexin 500 milliGRAM(s) Oral four times a day  chlorhexidine 2% Cloths 1 Application(s) Topical <User Schedule>  diphenhydrAMINE   Injectable 25 milliGRAM(s) IV Push every 24 hours  enoxaparin Injectable 40 milliGRAM(s) SubCutaneous every 24 hours  immune   globulin 10% (GAMMAGARD) IVPB 15 Gram(s) IV Intermittent every 24 hours  ivabradine 5 milliGRAM(s) Oral two times a day  lactated ringers Bolus 1000 milliLiter(s) IV Bolus <User Schedule>  lactated ringers Bolus 1000 milliLiter(s) IV Bolus <User Schedule>  lactated ringers. 1000 milliLiter(s) (100 mL/Hr) IV Continuous <Continuous>  pyridostigmine 60 milliGRAM(s) Oral three times a day  thyroid 15 milliGRAM(s) Oral <User Schedule>    MEDICATIONS  (PRN):  diphenhydrAMINE   Injectable 25 milliGRAM(s) IV Push at bedtime PRN Insomnia  gabapentin   Solution 100 milliGRAM(s) Oral three times a day PRN muscle spasm      Allergies:  No Known Allergies    Intolerances:  Erythromycin (Stomach Upset)      LABS:                        11.8   3.41  )-----------( 180      ( 25 Sep 2020 07:09 )             36.1     09-25    135  |  100  |  11  ----------------------------<  68<L>  3.8   |  24  |  0.64    Ca    9.5      25 Sep 2020 07:09  Phos  3.8     09-25  Mg     1.9     09-25    TPro  7.3  /  Alb  3.7  /  TBili  0.4  /  DBili  x   /  AST  18  /  ALT  16  /  AlkPhos  48  09-25          RADIOLOGY & ADDITIONAL TESTS:  Reviewed

## 2020-09-25 NOTE — CONSULT NOTE ADULT - ASSESSMENT
45F with PMH of POTS (postural orthostatic tachycardia syndrome), dysautonomia, small fiber neuropathy, and Hashimoto's thyroiditis presents for scheduled IVIG therapy (referred by Dr. Najjar) for her dysautonomia. Pt experiences intermittent flares of her dysautonomia which have increased in severity this year; her current flare began 16 weeks ago, at the start of which she felt like she was about to faint, lay down in bed (which had usually alleviated her symptoms during prior flares), and consequently experienced tonic muscle spasms as well as the sensation that her "brain was frying". GI consulted for dysphagia symptoms, and esophageal stasis on MBS.     #Dysphagia  - has had extensive outpatient w/u w/ manometry showing 30% failed swallows, normal EGD, normal gastric emptying study, and normal esophagram  - however, given the intermittent nature of her symptoms and mainly the finding of early satiety, obtain gastric emptying study to evaluate transit time  - obtain abdominal xray to assess stool burden as she is constipated, and this could be contributing to her symptoms    Alexa Cavazos MD  PGY-4, Gastroenterology Fellow  pager: [phone]

## 2020-09-26 LAB
ALBUMIN SERPL ELPH-MCNC: 3.4 G/DL — SIGNIFICANT CHANGE UP (ref 3.3–5)
ALP SERPL-CCNC: 49 U/L — SIGNIFICANT CHANGE UP (ref 40–120)
ALT FLD-CCNC: 15 U/L — SIGNIFICANT CHANGE UP (ref 10–45)
ANION GAP SERPL CALC-SCNC: 7 MMOL/L — SIGNIFICANT CHANGE UP (ref 5–17)
AST SERPL-CCNC: 15 U/L — SIGNIFICANT CHANGE UP (ref 10–40)
BILIRUB SERPL-MCNC: 0.2 MG/DL — SIGNIFICANT CHANGE UP (ref 0.2–1.2)
BUN SERPL-MCNC: 13 MG/DL — SIGNIFICANT CHANGE UP (ref 7–23)
CALCIUM SERPL-MCNC: 8.7 MG/DL — SIGNIFICANT CHANGE UP (ref 8.4–10.5)
CHLORIDE SERPL-SCNC: 105 MMOL/L — SIGNIFICANT CHANGE UP (ref 96–108)
CO2 SERPL-SCNC: 25 MMOL/L — SIGNIFICANT CHANGE UP (ref 22–31)
CREAT SERPL-MCNC: 0.64 MG/DL — SIGNIFICANT CHANGE UP (ref 0.5–1.3)
GLUCOSE SERPL-MCNC: 100 MG/DL — HIGH (ref 70–99)
HCT VFR BLD CALC: 34.7 % — SIGNIFICANT CHANGE UP (ref 34.5–45)
HGB BLD-MCNC: 11.4 G/DL — LOW (ref 11.5–15.5)
MAGNESIUM SERPL-MCNC: 1.8 MG/DL — SIGNIFICANT CHANGE UP (ref 1.6–2.6)
MCHC RBC-ENTMCNC: 26.9 PG — LOW (ref 27–34)
MCHC RBC-ENTMCNC: 32.9 GM/DL — SIGNIFICANT CHANGE UP (ref 32–36)
MCV RBC AUTO: 81.8 FL — SIGNIFICANT CHANGE UP (ref 80–100)
NRBC # BLD: 0 /100 WBCS — SIGNIFICANT CHANGE UP (ref 0–0)
PHOSPHATE SERPL-MCNC: 3.3 MG/DL — SIGNIFICANT CHANGE UP (ref 2.5–4.5)
PLATELET # BLD AUTO: 178 K/UL — SIGNIFICANT CHANGE UP (ref 150–400)
POTASSIUM SERPL-MCNC: 3.8 MMOL/L — SIGNIFICANT CHANGE UP (ref 3.5–5.3)
POTASSIUM SERPL-SCNC: 3.8 MMOL/L — SIGNIFICANT CHANGE UP (ref 3.5–5.3)
PROT SERPL-MCNC: 6.9 G/DL — SIGNIFICANT CHANGE UP (ref 6–8.3)
RBC # BLD: 4.24 M/UL — SIGNIFICANT CHANGE UP (ref 3.8–5.2)
RBC # FLD: 13 % — SIGNIFICANT CHANGE UP (ref 10.3–14.5)
SODIUM SERPL-SCNC: 137 MMOL/L — SIGNIFICANT CHANGE UP (ref 135–145)
WBC # BLD: 3.13 K/UL — LOW (ref 3.8–10.5)
WBC # FLD AUTO: 3.13 K/UL — LOW (ref 3.8–10.5)

## 2020-09-26 PROCEDURE — 78264 GASTRIC EMPTYING IMG STUDY: CPT | Mod: 26

## 2020-09-26 PROCEDURE — 99232 SBSQ HOSP IP/OBS MODERATE 35: CPT | Mod: GC

## 2020-09-26 PROCEDURE — 99232 SBSQ HOSP IP/OBS MODERATE 35: CPT

## 2020-09-26 RX ORDER — GABAPENTIN 400 MG/1
100 CAPSULE ORAL THREE TIMES A DAY
Refills: 0 | Status: DISCONTINUED | OUTPATIENT
Start: 2020-09-26 | End: 2020-09-30

## 2020-09-26 RX ADMIN — BUPROPION HYDROCHLORIDE 300 MILLIGRAM(S): 150 TABLET, EXTENDED RELEASE ORAL at 16:05

## 2020-09-26 RX ADMIN — SODIUM CHLORIDE 1000 MILLILITER(S): 9 INJECTION, SOLUTION INTRAVENOUS at 21:43

## 2020-09-26 RX ADMIN — PYRIDOSTIGMINE BROMIDE 60 MILLIGRAM(S): 60 SOLUTION ORAL at 16:05

## 2020-09-26 RX ADMIN — GABAPENTIN 100 MILLIGRAM(S): 400 CAPSULE ORAL at 22:57

## 2020-09-26 RX ADMIN — IVABRADINE 5 MILLIGRAM(S): 7.5 TABLET, FILM COATED ORAL at 07:04

## 2020-09-26 RX ADMIN — IMMUNE GLOBULIN (HUMAN) 12.5 GRAM(S): 10 INJECTION INTRAVENOUS; SUBCUTANEOUS at 10:15

## 2020-09-26 RX ADMIN — Medication 240 MILLIGRAM(S): at 09:57

## 2020-09-26 RX ADMIN — PYRIDOSTIGMINE BROMIDE 60 MILLIGRAM(S): 60 SOLUTION ORAL at 07:04

## 2020-09-26 RX ADMIN — SODIUM CHLORIDE 60 MILLILITER(S): 9 INJECTION, SOLUTION INTRAVENOUS at 22:53

## 2020-09-26 RX ADMIN — SODIUM CHLORIDE 1000 MILLILITER(S): 9 INJECTION, SOLUTION INTRAVENOUS at 08:45

## 2020-09-26 RX ADMIN — SODIUM CHLORIDE 60 MILLILITER(S): 9 INJECTION, SOLUTION INTRAVENOUS at 00:00

## 2020-09-26 RX ADMIN — Medication 25 MILLIGRAM(S): at 09:57

## 2020-09-26 RX ADMIN — Medication 15 MILLIGRAM(S): at 21:44

## 2020-09-26 RX ADMIN — Medication 600 MILLIGRAM(S): at 10:57

## 2020-09-26 RX ADMIN — PYRIDOSTIGMINE BROMIDE 60 MILLIGRAM(S): 60 SOLUTION ORAL at 21:44

## 2020-09-26 RX ADMIN — CHLORHEXIDINE GLUCONATE 1 APPLICATION(S): 213 SOLUTION TOPICAL at 07:08

## 2020-09-26 NOTE — PROGRESS NOTE ADULT - ASSESSMENT
45F with PMH of POTS, dysautonomia, small fiber neuropathy, and Hashimoto's thyroiditis admitted for scheduled IVIG therapy as a second round of treatment for her dysautonomia (9/23-9/27). Pt has increased swallowing difficulty throughout hospital stay; s/p modified barium swallow and abdominal xray - both normal results. Pt underwent gastric emptying study 9/26, pending results.

## 2020-09-26 NOTE — PROGRESS NOTE ADULT - PROBLEM SELECTOR PLAN 2
Pt has known hx of small fiber neuropathy. Following Barium Swallow oropharyngeal swallow was clinically judged to be WFL and without risk of aspiration.  - c/w Wellbutrin 300mg daily.   - Begin Lactated ringers. IV 1000 milliLiter(s) infused at 6mL/hr for symptomatic management  - Consult GI and Nutrition for further evaluation and malnutrition

## 2020-09-26 NOTE — PROGRESS NOTE ADULT - NUTRITIONAL ASSESSMENT
This patient has been assessed with a concern for Malnutrition and has been determined to have a diagnosis/diagnoses of Severe protein-calorie malnutrition and Underweight/BMI < 19.

## 2020-09-26 NOTE — PROGRESS NOTE ADULT - SUBJECTIVE AND OBJECTIVE BOX
CHIEF COMPLAINT:  fatigue, decreased apetite    INTERVAL HISTORY:  Was able to eat dinner last night (chicken/bread), getting IVIG at bedside and tolerating. No F/C, no pain, no new symptoms.     REVIEW OF SYSTEMS:  As per HPI, otherwise negative for Constitutional, Eyes, Ears/Nose/Mouth/Throat, Neck, Cardiovascular, Respiratory, Gastrointestinal, Genitourinary, Skin, Endocrine, Musculoskeletal, Psychiatric, and Hematologic/Lymphatic.    VITAL SIGNS:  Vital Signs Last 24 Hrs  T(C): 36.6 (26 Sep 2020 05:41), Max: 36.9 (25 Sep 2020 20:36)  T(F): 97.8 (26 Sep 2020 05:41), Max: 98.5 (25 Sep 2020 20:36)  HR: 65 (26 Sep 2020 05:41) (65 - 84)  BP: 99/61 (26 Sep 2020 05:41) (99/61 - 116/73)  BP(mean): --  RR: 14 (26 Sep 2020 05:41) (14 - 16)  SpO2: 98% (26 Sep 2020 05:41) (97% - 98%)    PHYSICAL EXAMINATION:  General: Well-developed, well nourished, in no acute distress.  Neuro:  MS- AAOX3, speech fluent, no dysarthria. Naming and repetition intact. Follows 3 step commands, remote and recent memory intact. Fund of knowledge full.  CN- PERRLA, EOMI, VFFC, face symmetric, V1-V3 intact except mildly diminished PP on Right, tongue midline, palate symmetric, shoulder shrug symmetric, hearing grossly intact   Fundi- poorly visualized  Motor- 5/5 x 4  Sensory- LT/Vib/PP/proprioception intact throughout  Reflexes- diminished throughout, plantar response withdrawal b/l  Coordination- FTN intact, no tremor or dysmetria  Gait- deferred        MEDS:  MEDICATIONS  (STANDING):  acetaminophen  IVPB .. 600 milliGRAM(s) IV Intermittent <User Schedule>  buPROPion XL . 300 milliGRAM(s) Oral daily  chlorhexidine 2% Cloths 1 Application(s) Topical <User Schedule>  diphenhydrAMINE   Injectable 25 milliGRAM(s) IV Push every 24 hours  enoxaparin Injectable 40 milliGRAM(s) SubCutaneous every 24 hours  immune   globulin 10% (GAMMAGARD) IVPB 15 Gram(s) IV Intermittent every 24 hours  ivabradine 5 milliGRAM(s) Oral two times a day  lactated ringers Bolus 1000 milliLiter(s) IV Bolus <User Schedule>  lactated ringers Bolus 1000 milliLiter(s) IV Bolus <User Schedule>  lactated ringers. 1000 milliLiter(s) (100 mL/Hr) IV Continuous <Continuous>  lactated ringers. 1000 milliLiter(s) (60 mL/Hr) IV Continuous <Continuous>  pyridostigmine 60 milliGRAM(s) Oral three times a day  thyroid 15 milliGRAM(s) Oral <User Schedule>    MEDICATIONS  (PRN):  diphenhydrAMINE   Injectable 25 milliGRAM(s) IV Push at bedtime PRN Insomnia  gabapentin   Solution 100 milliGRAM(s) Oral three times a day PRN muscle spasm      LABS:                          11.4   3.13  )-----------( 178      ( 26 Sep 2020 09:04 )             34.7     09-26    137  |  105  |  13  ----------------------------<  100<H>  3.8   |  25  |  0.64    Ca    8.7      26 Sep 2020 09:04  Phos  3.3     09-26  Mg     1.8     09-26    TPro  6.9  /  Alb  3.4  /  TBili  0.2  /  DBili  x   /  AST  15  /  ALT  15  /  AlkPhos  49  09-26    LIVER FUNCTIONS - ( 26 Sep 2020 09:04 )  Alb: 3.4 g/dL / Pro: 6.9 g/dL / ALK PHOS: 49 U/L / ALT: 15 U/L / AST: 15 U/L / GGT: x             RADIOLOGY & ADDITIONAL STUDIES:      IMPRESSION & PLAN:    45F with PMH of POTS, dysautonomia, small fiber neuropathy, and Hashimoto's thyroiditis admitted for scheduled IVIG therapy as a second round of treatment for her dysautonomia (9/23-9/27). ON Day 4/5 of IVIG, tolerating well today.  	     Problem/Plan - 1:  ·  Problem: Dysautonomia.  Plan: Pt has hx of dysautonomia c/b POTS treated with 1 round of IVIG in September 2018 at [facility] [place]. Now follows with Dr. Najjar who has referred her for this 2nd round of IVIG; Dr. Houston currently following pt at [facility]. Pt currently reports improvement in her muscle cramping (occurs on usage of muscles) such that she can now tolerate sustained motion up to 30s-10min; this flare has been ongoing for 16 weeks and she has stayed at [facility].  - IVIG 15mg doses (Gammagard) over 12h (6am-6pm) for 5 doses (9/23-9/27)  - 1L LR boluses over 1h prior to and after each IVIG dose (5-6am and 6-7pm)  - Give maintenance IVF LR 1L at 100cc/h during IVIG doses  - Tylenol 650mg IV prior to each dose + Benadryl 25mg IV prior  - c/w Pyridostigmine 60mg PO  - c/w home Gabapentin 100mg TID PRN (takes only for muscle spams as needed).      Problem/Plan - 2:  ·  Problem: Small fiber neuropathy.  Plan: Pt has known hx of small fiber neuropathy. Following Barium Swallow oropharyngeal swallow was clinically judged to be WFL and without risk of aspiration.  - c/w Wellbutrin 300mg daily.   - Begin Lactated ringers. IV 1000 milliLiter(s) infused at 6mL/hr for symptomatic management  - Consult GI and Nutrition for further evaluation and malnutrition. Consult appreciated, pending motility study today.     Problem/Plan - 3:  ·  Problem: Chest pain.  Plan: Pt admits to chest tightness that feels like "someone is sitting" on her- resolved  - Trop negative  - Per Respiratory therapist, Negative Inspiratory Force: -30 and Negative Inspiratory Force Effort: fair. No further monitoring recommended.  - Given that this pt history of similar symptoms, negative trops, no cardiac risk factors in PMH, no further workup prior to IVIG treatment.      Problem/Plan - 4:  ·  Problem: Cellulitis.  Plan: Pt reports completion of treatment for L toe cellulitis with 9 days of Keflex 500mg TID. *Gege reported that pt was prescribed 250mg QID of for 7 days 9/14/-9/20. Pt felt that cellulitis still hadn't resolved so abx rx was extended for another 7 days*  - Dr. Houston recs continuing with IVIG treatment given that risk is low for progression of the cellulitis  - D/C Keflex 500mg QID due to pt not following treatment.      Problem/Plan - 5:  ·  Problem: Hashimoto's thyroiditis.  Plan: Pt has hx of Hashimoto's thyroiditis.  - Continue home [place] thyroid hormone 15mg per user schedule (every 1 day: 6:00, 14:00, 22:00).      Problem/Plan - 6:  Problem: Prophylactic measure. Plan: Prophylactic measure.  - Enoxaparin 40 milligrams SubQ every 24 hours.

## 2020-09-26 NOTE — PROGRESS NOTE ADULT - SUBJECTIVE AND OBJECTIVE BOX
GASTROENTEROLOGY PROGRESS NOTE  Patient seen and examined at bedside. No new events noted.    PERTINENT REVIEW OF SYSTEMS:  As noted above    Allergies    No Known Allergies    Intolerances    erythromycin (Stomach Upset)    MEDICATIONS:  MEDICATIONS  (STANDING):  acetaminophen  IVPB .. 600 milliGRAM(s) IV Intermittent <User Schedule>  buPROPion XL . 300 milliGRAM(s) Oral daily  chlorhexidine 2% Cloths 1 Application(s) Topical <User Schedule>  diphenhydrAMINE   Injectable 25 milliGRAM(s) IV Push every 24 hours  enoxaparin Injectable 40 milliGRAM(s) SubCutaneous every 24 hours  immune   globulin 10% (GAMMAGARD) IVPB 15 Gram(s) IV Intermittent every 24 hours  ivabradine 5 milliGRAM(s) Oral two times a day  lactated ringers Bolus 1000 milliLiter(s) IV Bolus <User Schedule>  lactated ringers Bolus 1000 milliLiter(s) IV Bolus <User Schedule>  lactated ringers. 1000 milliLiter(s) (100 mL/Hr) IV Continuous <Continuous>  lactated ringers. 1000 milliLiter(s) (60 mL/Hr) IV Continuous <Continuous>  pyridostigmine 60 milliGRAM(s) Oral three times a day  thyroid 15 milliGRAM(s) Oral <User Schedule>    MEDICATIONS  (PRN):  diphenhydrAMINE   Injectable 25 milliGRAM(s) IV Push at bedtime PRN Insomnia  gabapentin   Solution 100 milliGRAM(s) Oral three times a day PRN muscle spasm    Vital Signs Last 24 Hrs  T(C): 36.6 (26 Sep 2020 05:41), Max: 36.9 (25 Sep 2020 20:36)  T(F): 97.8 (26 Sep 2020 05:41), Max: 98.5 (25 Sep 2020 20:36)  HR: 65 (26 Sep 2020 05:41) (65 - 84)  BP: 99/61 (26 Sep 2020 05:41) (99/61 - 116/73)  BP(mean): --  RR: 14 (26 Sep 2020 05:41) (14 - 16)  SpO2: 98% (26 Sep 2020 05:41) (97% - 98%)    09-25 @ 07:01  -  09-26 @ 07:00  --------------------------------------------------------  IN: 2450 mL / OUT: 0 mL / NET: 2450 mL      PHYSICAL EXAM:    General: Well developed; well nourished; in no acute distress  HEENT: MMM, conjunctiva and sclera clear  Gastrointestinal: Soft non-tender non-distended; Normal bowel sounds; No hepatosplenomegaly. No rebound or guarding  Skin: Warm and dry. No obvious rash    LABS:                        11.4   3.13  )-----------( 178      ( 26 Sep 2020 09:04 )             34.7     09-26    137  |  105  |  13  ----------------------------<  100<H>  3.8   |  25  |  0.64    Ca    8.7      26 Sep 2020 09:04  Phos  3.3     09-26  Mg     1.8     09-26    TPro  6.9  /  Alb  3.4  /  TBili  0.2  /  DBili  x   /  AST  15  /  ALT  15  /  AlkPhos  49  09-26                      RADIOLOGY & ADDITIONAL STUDIES:  Reviewed

## 2020-09-26 NOTE — PROGRESS NOTE ADULT - ASSESSMENT
45F with PMH of POTS (postural orthostatic tachycardia syndrome), dysautonomia, small fiber neuropathy, and Hashimoto's thyroiditis presents for scheduled IVIG therapy (referred by Dr. Najjar) for her dysautonomia. Pt experiences intermittent flares of her dysautonomia which have increased in severity this year; her current flare began 16 weeks ago, at the start of which she felt like she was about to faint, lay down in bed (which had usually alleviated her symptoms during prior flares), and consequently experienced tonic muscle spasms as well as the sensation that her "brain was frying". GI consulted for dysphagia symptoms, and esophageal stasis on MBS.     #Dysphagia  has had extensive outpatient w/u w/ manometry showing 30% failed swallows, normal EGD, normal gastric emptying study, and normal esophagram  however, given the intermittent nature of her symptoms and mainly the finding of early satiety, obtain gastric emptying study to evaluate transit time  - Follow up Gastric emtying study result  - AXR with no bowel obstruction. Unclear for stool burden given retained contrast from MBS. Trial of stool softners: miralax daily    Recommendations discussed with primary team  Plan discussed with GI service attending    Dc Chavez MD  PGY-4 GI fellow  Pager: [phone]       45F with PMH of POTS (postural orthostatic tachycardia syndrome), dysautonomia, small fiber neuropathy, and Hashimoto's thyroiditis presents for scheduled IVIG therapy (referred by Dr. Najjar) for her dysautonomia. Pt experiences intermittent flares of her dysautonomia which have increased in severity this year; her current flare began 16 weeks ago, at the start of which she felt like she was about to faint, lay down in bed (which had usually alleviated her symptoms during prior flares), and consequently experienced tonic muscle spasms as well as the sensation that her "brain was frying". GI consulted for dysphagia symptoms, and esophageal stasis on MBS.     #Dysphagia  has had extensive outpatient w/u w/ manometry showing 30% failed swallows, normal EGD, normal gastric emptying study, and normal esophagram  however, given the intermittent nature of her symptoms and mainly the finding of early satiety, obtain gastric emptying study to evaluate transit time  - Follow up Gastric emtying study result  - AXR with no bowel obstruction. Unclear for stool burden given retained contrast from MBS. Trial of stool softners: miralax daily    Recommendations discussed with primary team  Plan discussed with GI service attending.    Dc Chavez MD  PGY-4 GI fellow  Pager: [phone]

## 2020-09-26 NOTE — PROGRESS NOTE ADULT - PROBLEM SELECTOR PLAN 1
Pt has hx of dysautonomia c/b POTS treated with 1 round of IVIG in September 2018 at [facility] [place]. Now follows with Dr. Najjar who has referred her for this 2nd round of IVIG; Dr. Houston currently following pt at [facility]. Pt still having difficulty sitting up in bed due to muscle spasms.   - IVIG 15mg doses (Gammagard) over 12h (6am-6pm) for 5 doses (9/23-9/27)  - 1L LR boluses over 1h prior to and after each IVIG dose (5-6am and 6-7pm)  - Give maintenance IVF LR 1L at 100cc/h during IVIG doses  - Tylenol 650mg IV prior to each dose + Benadryl 25mg IV prior  - c/w Pyridostigmine 60mg PO  - c/w home Gabapentin 100mg TID PRN (takes only for muscle spams as needed) Pt has hx of dysautonomia c/b POTS treated with 1 round of IVIG in September 2018 at [facility] [place]. Now follows with Dr. Najjar who has referred her for this 2nd round of IVIG; Dr. Houston currently following pt at [facility]. Pt still having difficulty sitting up in bed due to muscle spasms. Pt has persistent trouble swallowing; s/p modified barium swallow and abdominal xray - both negative for abnormalities. Pt underwent gastric emptying study on 9/26.   - f/u gastric emptying study results  - f/u ceruloplasmin, heavy metals screen, Vit B12  - IVIG 15mg doses (Gammagard) over 12h (6am-6pm) for 5 doses (9/23-9/27)  - 1L LR boluses over 1h prior to and after each IVIG dose (5-6am and 6-7pm)  - Give maintenance IVF LR 1L at 100cc/h during IVIG doses  - Tylenol 650mg IV prior to each dose + Benadryl 25mg IV prior  - c/w Pyridostigmine 60mg PO  - c/w home Gabapentin 100mg TID PRN (takes only for muscle spams as needed) Pt has hx of dysautonomia c/b POTS treated with 1 round of IVIG in September 2018 at [facility] [place]. Now follows with Dr. Najjar who has referred her for this 2nd round of IVIG; Dr. Houston currently following pt at [facility]. Pt still having difficulty sitting up in bed due to muscle spasms. Pt has persistent trouble swallowing; s/p modified barium swallow and abdominal xray - both negative for abnormalities. Pt underwent gastric emptying study on 9/26.   - f/u gastric emptying study results  - IVIG 15mg doses (Gammagard) over 12h (6am-6pm) for 5 doses (9/23-9/27)  - 1L LR boluses over 1h prior to and after each IVIG dose (5-6am and 6-7pm)  - Give maintenance IVF LR 1L at 100cc/h during IVIG doses  - Tylenol 650mg IV prior to each dose + Benadryl 25mg IV prior  - c/w Pyridostigmine 60mg PO  - c/w home Gabapentin 100mg TID PRN (takes only for muscle spams as needed)

## 2020-09-26 NOTE — PROGRESS NOTE ADULT - ATTENDING COMMENTS
Pt without any new complaints today. Was able to eat dinner last night. Having GI studies today and is day 4/5 of IVIG and thus far tolerating well. Plan to f/u GI recs and D/C planning for rehab early next week

## 2020-09-26 NOTE — PROGRESS NOTE ADULT - SUBJECTIVE AND OBJECTIVE BOX
INTERVAL HPI/OVERNIGHT EVENTS:  Patient was seen and examined at bedside. As per overnight team and patient, no o/n events. Pt has persistent stiffness in her neck and a tight chest sensation. Has had increased difficulty swallowing food and PO medications. Imaging and EKG have not revealed any abnormalities.     VITAL SIGNS:  T(F): 98.1 (09-26-20 @ 16:04)  HR: 59 (09-26-20 @ 16:04)  BP: 112/74 (09-26-20 @ 16:04)  RR: 16 (09-26-20 @ 16:04)  SpO2: 98% (09-26-20 @ 16:04)  Wt(kg): --    PHYSICAL EXAM:    General: Nad, tired-appearing female   HEENT: PEERL, EOMI, anicteric sclera, MMM  Respiratory: CTA b/l, no wheezes, rales, or rhonchi  Cardiovascular: +RRR, +S1/S2, no murmurs, rubs, or gallops  Gastrointestinal: Soft, nontender, nondistended, normoactive bowel sounds x4 quadrants  Extremities: WWP, no peripheral edema, no cyanosis b/l  Vascular: 2+ radial, DP pulses b/l  Neurological:  - Mental Status: AAOx3; speech is fluent with intact naming, repetition, and comprehension  - Cranial Nerves II - XII:  II: PEERLA; visual fields are full to confrontation  III, IV, VI: EOMI, no nystagmus appreciated  V: facial sensation intact to light touch V1-V3 b/l  VII: no ptosis, no facial droop, symmetric eyebrow raise and closure; overall facial muscle fatigue following prolonged use  VIII: hearing intact to finger rub b/l  IX, X: uvula is midline, soft palate rises symmetrically  XI: head turning and shoulder shrug intact b/l  XII: tongue protrusion midline  - Motor: strength is 5/5 b/l LLE & RLE, 5/5 b/l LUE & RUE. normal muscle bulk and tone throughout, no pronator drift    MEDICATIONS  (STANDING):  acetaminophen  IVPB .. 600 milliGRAM(s) IV Intermittent <User Schedule>  buPROPion XL . 300 milliGRAM(s) Oral daily  chlorhexidine 2% Cloths 1 Application(s) Topical <User Schedule>  diphenhydrAMINE   Injectable 25 milliGRAM(s) IV Push every 24 hours  enoxaparin Injectable 40 milliGRAM(s) SubCutaneous every 24 hours  immune   globulin 10% (GAMMAGARD) IVPB 15 Gram(s) IV Intermittent every 24 hours  ivabradine 5 milliGRAM(s) Oral two times a day  lactated ringers Bolus 1000 milliLiter(s) IV Bolus <User Schedule>  lactated ringers Bolus 1000 milliLiter(s) IV Bolus <User Schedule>  lactated ringers. 1000 milliLiter(s) (100 mL/Hr) IV Continuous <Continuous>  lactated ringers. 1000 milliLiter(s) (60 mL/Hr) IV Continuous <Continuous>  pyridostigmine 60 milliGRAM(s) Oral three times a day  thyroid 15 milliGRAM(s) Oral <User Schedule>    MEDICATIONS  (PRN):  diphenhydrAMINE   Injectable 25 milliGRAM(s) IV Push at bedtime PRN Insomnia  gabapentin   Solution 100 milliGRAM(s) Oral three times a day PRN muscle spasm      Allergies    No Known Allergies    Intolerances    erythromycin (Stomach Upset)      LABS:                        11.4   3.13  )-----------( 178      ( 26 Sep 2020 09:04 )             34.7     09-26    137  |  105  |  13  ----------------------------<  100<H>  3.8   |  25  |  0.64    Ca    8.7      26 Sep 2020 09:04  Phos  3.3     09-26  Mg     1.8     09-26    TPro  6.9  /  Alb  3.4  /  TBili  0.2  /  DBili  x   /  AST  15  /  ALT  15  /  AlkPhos  49  09-26          RADIOLOGY & ADDITIONAL TESTS:  Reviewed

## 2020-09-26 NOTE — PROGRESS NOTE ADULT - NUTRITIONAL ASSESSMENT
This patient has been assessed with a concern for Malnutrition and has been determined to have a diagnosis/diagnoses of Severe protein-calorie malnutrition and Underweight/BMI < 19.    This patient is being managed with:   Diet NPO after Midnight-     NPO Start Date: 25-Sep-2020   NPO Start Time: 23:59  Entered: Sep 25 2020  4:10PM    Diet Regular-  Kosher  Entered: Sep 22 2020  5:27PM

## 2020-09-27 LAB
ANION GAP SERPL CALC-SCNC: 9 MMOL/L — SIGNIFICANT CHANGE UP (ref 5–17)
BASOPHILS # BLD AUTO: 0.02 K/UL — SIGNIFICANT CHANGE UP (ref 0–0.2)
BASOPHILS NFR BLD AUTO: 0.5 % — SIGNIFICANT CHANGE UP (ref 0–2)
BUN SERPL-MCNC: 7 MG/DL — SIGNIFICANT CHANGE UP (ref 7–23)
CALCIUM SERPL-MCNC: 8.5 MG/DL — SIGNIFICANT CHANGE UP (ref 8.4–10.5)
CERULOPLASMIN SERPL-MCNC: 20 MG/DL — SIGNIFICANT CHANGE UP (ref 16–45)
CHLORIDE SERPL-SCNC: 105 MMOL/L — SIGNIFICANT CHANGE UP (ref 96–108)
CO2 SERPL-SCNC: 24 MMOL/L — SIGNIFICANT CHANGE UP (ref 22–31)
CREAT SERPL-MCNC: 0.59 MG/DL — SIGNIFICANT CHANGE UP (ref 0.5–1.3)
EOSINOPHIL # BLD AUTO: 0.08 K/UL — SIGNIFICANT CHANGE UP (ref 0–0.5)
EOSINOPHIL NFR BLD AUTO: 2.2 % — SIGNIFICANT CHANGE UP (ref 0–6)
GLUCOSE SERPL-MCNC: 99 MG/DL — SIGNIFICANT CHANGE UP (ref 70–99)
HCT VFR BLD CALC: 33.1 % — LOW (ref 34.5–45)
HGB BLD-MCNC: 10.9 G/DL — LOW (ref 11.5–15.5)
IMM GRANULOCYTES NFR BLD AUTO: 0.3 % — SIGNIFICANT CHANGE UP (ref 0–1.5)
LYMPHOCYTES # BLD AUTO: 1.29 K/UL — SIGNIFICANT CHANGE UP (ref 1–3.3)
LYMPHOCYTES # BLD AUTO: 34.8 % — SIGNIFICANT CHANGE UP (ref 13–44)
MAGNESIUM SERPL-MCNC: 1.7 MG/DL — SIGNIFICANT CHANGE UP (ref 1.6–2.6)
MCHC RBC-ENTMCNC: 27.1 PG — SIGNIFICANT CHANGE UP (ref 27–34)
MCHC RBC-ENTMCNC: 32.9 GM/DL — SIGNIFICANT CHANGE UP (ref 32–36)
MCV RBC AUTO: 82.3 FL — SIGNIFICANT CHANGE UP (ref 80–100)
MONOCYTES # BLD AUTO: 0.53 K/UL — SIGNIFICANT CHANGE UP (ref 0–0.9)
MONOCYTES NFR BLD AUTO: 14.3 % — HIGH (ref 2–14)
NEUTROPHILS # BLD AUTO: 1.78 K/UL — LOW (ref 1.8–7.4)
NEUTROPHILS NFR BLD AUTO: 47.9 % — SIGNIFICANT CHANGE UP (ref 43–77)
NRBC # BLD: 0 /100 WBCS — SIGNIFICANT CHANGE UP (ref 0–0)
PHOSPHATE SERPL-MCNC: 3.3 MG/DL — SIGNIFICANT CHANGE UP (ref 2.5–4.5)
PLATELET # BLD AUTO: 161 K/UL — SIGNIFICANT CHANGE UP (ref 150–400)
POTASSIUM SERPL-MCNC: 3.6 MMOL/L — SIGNIFICANT CHANGE UP (ref 3.5–5.3)
POTASSIUM SERPL-SCNC: 3.6 MMOL/L — SIGNIFICANT CHANGE UP (ref 3.5–5.3)
RBC # BLD: 4.02 M/UL — SIGNIFICANT CHANGE UP (ref 3.8–5.2)
RBC # FLD: 12.8 % — SIGNIFICANT CHANGE UP (ref 10.3–14.5)
SODIUM SERPL-SCNC: 138 MMOL/L — SIGNIFICANT CHANGE UP (ref 135–145)
VIT B12 SERPL-MCNC: 335 PG/ML — SIGNIFICANT CHANGE UP (ref 232–1245)
WBC # BLD: 3.71 K/UL — LOW (ref 3.8–10.5)
WBC # FLD AUTO: 3.71 K/UL — LOW (ref 3.8–10.5)

## 2020-09-27 PROCEDURE — 99232 SBSQ HOSP IP/OBS MODERATE 35: CPT

## 2020-09-27 RX ORDER — ACETAMINOPHEN 500 MG
650 TABLET ORAL EVERY 6 HOURS
Refills: 0 | Status: DISCONTINUED | OUTPATIENT
Start: 2020-09-27 | End: 2020-09-30

## 2020-09-27 RX ADMIN — SODIUM CHLORIDE 1000 MILLILITER(S): 9 INJECTION, SOLUTION INTRAVENOUS at 22:24

## 2020-09-27 RX ADMIN — Medication 240 MILLIGRAM(S): at 10:26

## 2020-09-27 RX ADMIN — IVABRADINE 5 MILLIGRAM(S): 7.5 TABLET, FILM COATED ORAL at 06:24

## 2020-09-27 RX ADMIN — PYRIDOSTIGMINE BROMIDE 60 MILLIGRAM(S): 60 SOLUTION ORAL at 09:15

## 2020-09-27 RX ADMIN — Medication 650 MILLIGRAM(S): at 02:30

## 2020-09-27 RX ADMIN — Medication 650 MILLIGRAM(S): at 01:30

## 2020-09-27 RX ADMIN — Medication 15 MILLIGRAM(S): at 22:42

## 2020-09-27 RX ADMIN — PYRIDOSTIGMINE BROMIDE 60 MILLIGRAM(S): 60 SOLUTION ORAL at 22:42

## 2020-09-27 RX ADMIN — Medication 15 MILLIGRAM(S): at 09:15

## 2020-09-27 RX ADMIN — Medication 600 MILLIGRAM(S): at 11:18

## 2020-09-27 RX ADMIN — Medication 25 MILLIGRAM(S): at 10:26

## 2020-09-27 RX ADMIN — Medication 15 MILLIGRAM(S): at 15:07

## 2020-09-27 RX ADMIN — BUPROPION HYDROCHLORIDE 300 MILLIGRAM(S): 150 TABLET, EXTENDED RELEASE ORAL at 12:17

## 2020-09-27 RX ADMIN — IMMUNE GLOBULIN (HUMAN) 12.5 GRAM(S): 10 INJECTION INTRAVENOUS; SUBCUTANEOUS at 10:51

## 2020-09-27 RX ADMIN — SODIUM CHLORIDE 1000 MILLILITER(S): 9 INJECTION, SOLUTION INTRAVENOUS at 09:11

## 2020-09-27 RX ADMIN — PYRIDOSTIGMINE BROMIDE 60 MILLIGRAM(S): 60 SOLUTION ORAL at 15:07

## 2020-09-27 NOTE — PROGRESS NOTE ADULT - SUBJECTIVE AND OBJECTIVE BOX
CHIEF COMPLAINT:  No new complaints    INTERVAL HISTORY:  Tolerated IVIG yesterday, it made her fatigued and she had a mild HA afterwards. Inquired about fasting/ the Sikh holiday. I advised that she should try to eat small frequent meals due to her POTS hx and she should discuss this with her Rabbi. No F/C, no pain, all other ROS are negative      VITAL SIGNS:  Vital Signs Last 24 Hrs  T(C): 36.3 (27 Sep 2020 11:00), Max: 36.7 (26 Sep 2020 16:04)  T(F): 97.4 (27 Sep 2020 11:00), Max: 98.1 (26 Sep 2020 16:04)  HR: 61 (27 Sep 2020 11:00) (59 - 72)  BP: 122/79 (27 Sep 2020 11:00) (102/86 - 122/79)  BP(mean): --  RR: 16 (27 Sep 2020 11:00) (16 - 16)  SpO2: 98% (27 Sep 2020 11:00) (97% - 98%)    PHYSICAL EXAMINATION:    General: Well-developed, well nourished, in no acute distress.  Neuro:  MS- AAOX3, speech fluent, no dysarthria. Naming and repetition intact. Follows 3 step commands, remote and recent memory intact. Fund of knowledge full.  CN- PERRLA, EOMI, VFFC, face symmetric, V1-V3 intact today, tongue midline, palate symmetric, shoulder shrug symmetric, hearing grossly intact   Fundi- poorly visualized  Motor- 5/5 x 4  Sensory- LT/Vib/PP/proprioception intact throughout  Reflexes- diminished throughout, plantar response withdrawal b/l  Coordination- FTN intact, no tremor or dysmetria  Gait- deferred      MEDS:  MEDICATIONS  (STANDING):  buPROPion XL . 300 milliGRAM(s) Oral daily  chlorhexidine 2% Cloths 1 Application(s) Topical <User Schedule>  enoxaparin Injectable 40 milliGRAM(s) SubCutaneous every 24 hours  ivabradine 5 milliGRAM(s) Oral two times a day  lactated ringers Bolus 1000 milliLiter(s) IV Bolus <User Schedule>  lactated ringers. 1000 milliLiter(s) (100 mL/Hr) IV Continuous <Continuous>  lactated ringers. 1000 milliLiter(s) (60 mL/Hr) IV Continuous <Continuous>  pyridostigmine 60 milliGRAM(s) Oral three times a day  thyroid 15 milliGRAM(s) Oral <User Schedule>    MEDICATIONS  (PRN):  acetaminophen    Suspension .. 650 milliGRAM(s) Oral every 6 hours PRN Temp greater or equal to 38C (100.4F), Moderate Pain (4 - 6), Severe Pain (7 - 10)  diphenhydrAMINE   Injectable 25 milliGRAM(s) IV Push at bedtime PRN Insomnia  gabapentin 100 milliGRAM(s) Oral three times a day PRN muscle spasm      LABS:                          10.9   3.71  )-----------( 161      ( 27 Sep 2020 07:18 )             33.1     09-27    138  |  105  |  7   ----------------------------<  99  3.6   |  24  |  0.59    Ca    8.5      27 Sep 2020 07:18  Phos  3.3     09-27  Mg     1.7     09-27    TPro  6.9  /  Alb  3.4  /  TBili  0.2  /  DBili  x   /  AST  15  /  ALT  15  /  AlkPhos  49  09-26    LIVER FUNCTIONS - ( 26 Sep 2020 09:04 )  Alb: 3.4 g/dL / Pro: 6.9 g/dL / ALK PHOS: 49 U/L / ALT: 15 U/L / AST: 15 U/L / GGT: x             IMPRESSION & PLAN:    45F with PMH of POTS, dysautonomia, small fiber neuropathy, and Hashimoto's thyroiditis admitted for scheduled IVIG therapy as a second round of treatment for her dysautonomia (9/23-9/27). ON Day 5/5 of IVIG, being connected during time of exam.  	     Problem/Plan - 1:  ·  Problem: Dysautonomia.  Plan: Pt has hx of dysautonomia c/b POTS treated with 1 round of IVIG in September 2018 at [facility] [place]. Now follows with Dr. Najjar who has referred her for this 2nd round of IVIG; Dr. Houston currently following pt at [facility]. Pt currently reports improvement in her muscle cramping (occurs on usage of muscles) such that she can now tolerate sustained motion up to 30s-10min; this flare has been ongoing for 16 weeks and she has stayed at [facility].  - IVIG 15mg doses (Gammagard) over 12h (6am-6pm) 5th dose today on 9/27  - 1L LR boluses over 1h prior to and after each IVIG dose (5-6am and 6-7pm)  - Give maintenance IVF LR 1L at 100cc/h during IVIG doses  - Tylenol 650mg IV prior to each dose + Benadryl 25mg IV prior  - c/w Pyridostigmine 60mg PO  - c/w home Gabapentin 100mg TID PRN (takes only for muscle spams as needed).      Problem/Plan - 2:  ·  Problem: Small fiber neuropathy.  Plan: Pt has known hx of small fiber neuropathy. Following Barium Swallow oropharyngeal swallow was clinically judged to be WFL and without risk of aspiration.  - c/w Wellbutrin 300mg daily.   - Begin Lactated ringers. IV 1000 milliLiter(s) infused at 6mL/hr for symptomatic management  - Consult GI and Nutrition for further evaluation and malnutrition. Consult appreciated, abd Xray negative for obstruction, will f/u emptying study.   Problem/Plan - 3:  ·  Problem: Chest pain.  Plan: Pt admits to chest tightness that feels like "someone is sitting" on her- resolved  - Trop negative  - Per Respiratory therapist, Negative Inspiratory Force: -30 and Negative Inspiratory Force Effort: fair. No further monitoring recommended.  - Given that this pt history of similar symptoms, negative trops, no cardiac risk factors in PMH, no further workup prior to IVIG treatment.      Problem/Plan - 4:  ·  Problem: Cellulitis.  Plan: Pt reports completion of treatment for L toe cellulitis with 9 days of Keflex 500mg TID. *[facility] pt was prescribed 250mg QID of for 7 days 9/14/-9/20. Pt felt that cellulitis still hadn't resolved so abx rx was extended for another 7 days*  - Dr. Houston recs continuing with IVIG treatment given that risk is low for progression of the cellulitis  - D/C Keflex 500mg QID due to pt not following treatment.      Problem/Plan - 5:  ·  Problem: Hashimoto's thyroiditis.  Plan: Pt has hx of Hashimoto's thyroiditis.  - Continue home [place] thyroid hormone 15mg per user schedule (every 1 day: 6:00, 14:00, 22:00).      Problem/Plan - 6:  Problem: Prophylactic measure. Plan: Prophylactic measure.  - Enoxaparin 40 milligrams SubQ every 24 hours.        Assessment and Plan:   · Nutritional Assessment	This patient has been assessed with a concern for Malnutrition and has been determined to have a diagnosis/diagnoses of Severe protein-calorie malnutrition and Underweight/BMI < 19.    This patient is being managed with:   Diet NPO after Midnight-     NPO Start Date: 25-Sep-2020   NPO Start Time: 23:59  Entered: Sep 25 2020  4:10PM    Diet Regular-  Kosher  Entered: Sep 22 2020  5:27PM

## 2020-09-28 ENCOUNTER — TRANSCRIPTION ENCOUNTER (OUTPATIENT)
Age: 45
End: 2020-09-28

## 2020-09-28 LAB
ANION GAP SERPL CALC-SCNC: 8 MMOL/L — SIGNIFICANT CHANGE UP (ref 5–17)
BASOPHILS # BLD AUTO: 0.03 K/UL — SIGNIFICANT CHANGE UP (ref 0–0.2)
BASOPHILS NFR BLD AUTO: 0.8 % — SIGNIFICANT CHANGE UP (ref 0–2)
BUN SERPL-MCNC: 6 MG/DL — LOW (ref 7–23)
CALCIUM SERPL-MCNC: 8.9 MG/DL — SIGNIFICANT CHANGE UP (ref 8.4–10.5)
CHLORIDE SERPL-SCNC: 104 MMOL/L — SIGNIFICANT CHANGE UP (ref 96–108)
CO2 SERPL-SCNC: 26 MMOL/L — SIGNIFICANT CHANGE UP (ref 22–31)
CREAT SERPL-MCNC: 0.57 MG/DL — SIGNIFICANT CHANGE UP (ref 0.5–1.3)
EOSINOPHIL # BLD AUTO: 0.06 K/UL — SIGNIFICANT CHANGE UP (ref 0–0.5)
EOSINOPHIL NFR BLD AUTO: 1.6 % — SIGNIFICANT CHANGE UP (ref 0–6)
GLUCOSE SERPL-MCNC: 94 MG/DL — SIGNIFICANT CHANGE UP (ref 70–99)
HCT VFR BLD CALC: 34.1 % — LOW (ref 34.5–45)
HGB BLD-MCNC: 11.1 G/DL — LOW (ref 11.5–15.5)
IMM GRANULOCYTES NFR BLD AUTO: 0.3 % — SIGNIFICANT CHANGE UP (ref 0–1.5)
LYMPHOCYTES # BLD AUTO: 1.48 K/UL — SIGNIFICANT CHANGE UP (ref 1–3.3)
LYMPHOCYTES # BLD AUTO: 39.2 % — SIGNIFICANT CHANGE UP (ref 13–44)
MAGNESIUM SERPL-MCNC: 1.7 MG/DL — SIGNIFICANT CHANGE UP (ref 1.6–2.6)
MCHC RBC-ENTMCNC: 26.4 PG — LOW (ref 27–34)
MCHC RBC-ENTMCNC: 32.6 GM/DL — SIGNIFICANT CHANGE UP (ref 32–36)
MCV RBC AUTO: 81 FL — SIGNIFICANT CHANGE UP (ref 80–100)
MONOCYTES # BLD AUTO: 0.43 K/UL — SIGNIFICANT CHANGE UP (ref 0–0.9)
MONOCYTES NFR BLD AUTO: 11.4 % — SIGNIFICANT CHANGE UP (ref 2–14)
NEUTROPHILS # BLD AUTO: 1.77 K/UL — LOW (ref 1.8–7.4)
NEUTROPHILS NFR BLD AUTO: 46.7 % — SIGNIFICANT CHANGE UP (ref 43–77)
NRBC # BLD: 0 /100 WBCS — SIGNIFICANT CHANGE UP (ref 0–0)
PHOSPHATE SERPL-MCNC: 3.3 MG/DL — SIGNIFICANT CHANGE UP (ref 2.5–4.5)
PLATELET # BLD AUTO: 170 K/UL — SIGNIFICANT CHANGE UP (ref 150–400)
POTASSIUM SERPL-MCNC: 3.6 MMOL/L — SIGNIFICANT CHANGE UP (ref 3.5–5.3)
POTASSIUM SERPL-SCNC: 3.6 MMOL/L — SIGNIFICANT CHANGE UP (ref 3.5–5.3)
RBC # BLD: 4.21 M/UL — SIGNIFICANT CHANGE UP (ref 3.8–5.2)
RBC # FLD: 12.9 % — SIGNIFICANT CHANGE UP (ref 10.3–14.5)
SODIUM SERPL-SCNC: 138 MMOL/L — SIGNIFICANT CHANGE UP (ref 135–145)
WBC # BLD: 3.78 K/UL — LOW (ref 3.8–10.5)
WBC # FLD AUTO: 3.78 K/UL — LOW (ref 3.8–10.5)

## 2020-09-28 PROCEDURE — 99233 SBSQ HOSP IP/OBS HIGH 50: CPT

## 2020-09-28 PROCEDURE — 99232 SBSQ HOSP IP/OBS MODERATE 35: CPT | Mod: GC

## 2020-09-28 RX ORDER — KETOROLAC TROMETHAMINE 30 MG/ML
10 SYRINGE (ML) INJECTION ONCE
Refills: 0 | Status: DISCONTINUED | OUTPATIENT
Start: 2020-09-28 | End: 2020-09-29

## 2020-09-28 RX ADMIN — IVABRADINE 5 MILLIGRAM(S): 7.5 TABLET, FILM COATED ORAL at 18:26

## 2020-09-28 RX ADMIN — ENOXAPARIN SODIUM 40 MILLIGRAM(S): 100 INJECTION SUBCUTANEOUS at 08:24

## 2020-09-28 RX ADMIN — PYRIDOSTIGMINE BROMIDE 60 MILLIGRAM(S): 60 SOLUTION ORAL at 10:31

## 2020-09-28 RX ADMIN — IVABRADINE 5 MILLIGRAM(S): 7.5 TABLET, FILM COATED ORAL at 10:32

## 2020-09-28 RX ADMIN — BUPROPION HYDROCHLORIDE 300 MILLIGRAM(S): 150 TABLET, EXTENDED RELEASE ORAL at 11:14

## 2020-09-28 RX ADMIN — PYRIDOSTIGMINE BROMIDE 60 MILLIGRAM(S): 60 SOLUTION ORAL at 14:37

## 2020-09-28 RX ADMIN — Medication 15 MILLIGRAM(S): at 10:32

## 2020-09-28 RX ADMIN — PYRIDOSTIGMINE BROMIDE 60 MILLIGRAM(S): 60 SOLUTION ORAL at 22:37

## 2020-09-28 RX ADMIN — Medication 15 MILLIGRAM(S): at 14:36

## 2020-09-28 RX ADMIN — GABAPENTIN 100 MILLIGRAM(S): 400 CAPSULE ORAL at 18:26

## 2020-09-28 RX ADMIN — CHLORHEXIDINE GLUCONATE 1 APPLICATION(S): 213 SOLUTION TOPICAL at 08:26

## 2020-09-28 RX ADMIN — Medication 15 MILLIGRAM(S): at 22:37

## 2020-09-28 NOTE — DISCHARGE NOTE PROVIDER - CARE PROVIDER_API CALL
Brenna Houston  NEUROLOGY  [address] [place] [address]  Phone: [phone]  Fax: [phone]  Follow Up Time: 1 week   Najjar, Souhel (MD)  Neurology  [address], [address] [place] [address], [place] [address]  Phone: [phone]  Fax: [phone]  Follow Up Time:

## 2020-09-28 NOTE — DISCHARGE NOTE PROVIDER - NSDCMRMEDTOKEN_GEN_ALL_CORE_FT
buPROPion 300 mg/24 hours (XL) oral tablet, extended release: 1 tab(s) orally once a day  Corlanor 5 mg oral tablet: 1 tab(s) orally 2 times a day  Mestinon 60 mg oral tablet: 1 tab(s) orally 3 times a day  Neurontin 100 mg oral capsule: 1 cap(s) orally three times a day , As Needed  thyroid desiccated 15 mg oral tablet: 1 tab(s) orally 3 times a day

## 2020-09-28 NOTE — PROGRESS NOTE ADULT - PROBLEM SELECTOR PLAN 3
Pt admits to chest tightness that feels like "someone is sitting" on her.  - Trop negative  - Per Respiratory therapist, Negative Inspiratory Force: -30 and Negative Inspiratory Force Effort: fair. No further monitoring recommended.  - Given that this pt history of similar symptoms, negative trops, no cardiac risk factors in PMH, no further workup prior to IVIG treatment.
Pt reports completion of treatment for L toe cellulitis with 9 days of Keflex 500mg TID. *[facility] reported that pt was prescribed 250mg QID of for 7 days 9/14/-9/20. Pt felt that cellulitis still hadn't resolved so abx rx was extended for another 7 days*  - Dr. Celso davis continuing with IVIG treatment given that risk is low for progression of the cellulitis  - Keflex 500mg QID re-started considering pt did not complete 2nd round of abx as prescribed, to be given till 9/27
Pt admits to chest tightness that feels like "someone is sitting" on her.  - Trop negative  - Per Respiratory therapist, Negative Inspiratory Force: -30 and Negative Inspiratory Force Effort: fair. No further monitoring recommended.  - Given that this pt history of similar symptoms, negative trops, no cardiac risk factors in PMH, no further workup prior to IVIG treatment.
Pt has known hx of small fiber neuropathy.  - F/u with modified barium swallow per Speech and Swallow recommendation  - c/w Wellbutrin 300mg daily.
Pt admits to chest tightness that feels like "someone is sitting" on her.  - Trop negative  - Per Respiratory therapist, Negative Inspiratory Force: -30 and Negative Inspiratory Force Effort: fair. No further monitoring recommended.  - Given that this pt history of similar symptoms, negative trops, no cardiac risk factors in PMH, no further workup prior to IVIG treatment.   - c/w Ivabradine 5 milligrams PO BID home medication

## 2020-09-28 NOTE — PROGRESS NOTE ADULT - PROBLEM SELECTOR PLAN 4
Pt reports completion of treatment for L toe cellulitis with 9 days of Keflex 500mg TID. *[facility] reported that pt was prescribed 250mg QID of for 7 days 9/14/-9/20. Pt felt that cellulitis still hadn't resolved so abx rx was extended for another 7 days*  - Dr. Celso davis continuing with IVIG treatment given that risk is low for progression of the cellulitis  - D/C Keflex 500mg QID due to pt not following treatment
Pt has hx of Hashimoto's thyroiditis.  - Takes dried thyroid hormone 45mg at home  - Clinical pharmacist recommends Synthroid 75mg daily based on equivalent dosing
Pt reports completion of treatment for L toe cellulitis with 9 days of Keflex 500mg TID. *[facility] reported that pt was prescribed 250mg QID of for 7 days 9/14/-9/20. Pt felt that cellulitis still hadn't resolved so abx rx was extended for another 7 days*  - Dr. Celso davis continuing with IVIG treatment given that risk is low for progression of the cellulitis  - D/C Keflex 500mg QID due to pt not following treatment
Pt admits to chest tightness that feels like "someone is sitting" on her.  - Trop negative  - Per Respiratory therapist, Negative Inspiratory Force: -30 and Negative Inspiratory Force Effort: fair. No further monitoring recommended.  - Given that this pt history of similar symptoms, negative trops, no cardiac risk factors in PMH, no further workup prior to IVIG treatment
Pt reports completion of treatment for L toe cellulitis with 9 days of Keflex 500mg TID. *[facility] reported that pt was prescribed 250mg QID of for 7 days 9/14/-9/20. Pt felt that cellulitis still hadn't resolved so abx rx was extended for another 7 days*  - Dr. Celso davis continuing with IVIG treatment given that risk is low for progression of the cellulitis  - D/C Keflex 500mg QID due to pt not following treatment.

## 2020-09-28 NOTE — PROGRESS NOTE ADULT - PROBLEM SELECTOR PLAN 5
Pt has hx of Hashimoto's thyroiditis.  - Continue home [place] thyroid hormone 15mg per user schedule (every 1 day: 6:00, 14:00, 22:00).
Pt has hx of Hashimoto's thyroiditis.  - Continue home [place] thyroid hormone 15mg per user schedule (every 1 day: 6:00, 14:00, 22:00).
Pt has known hx of small fiber neuropathy.  - c/w home Wellbutrin 300mg daily
Pt has hx of Hashimoto's thyroiditis.  - Continue home [employer] thyroid hormone 15mg per user schedule (every 1 day: 6:00, 14:00, 22:00).
Pt has hx of Hashimoto's thyroiditis.  - Continue home [place] thyroid hormone 15mg per user schedule (every 1 day: 6:00, 14:00, 22:00)

## 2020-09-28 NOTE — PROGRESS NOTE ADULT - ASSESSMENT
45F with PMH of POTS, dysautonomia, small fiber neuropathy, and Hashimoto's thyroiditis admitted for scheduled IVIG therapy as a second round of treatment for her dysautonomia (9/23-9/27). Pt has increased swallowing difficulty throughout hospital stay; s/p modified barium swallow (result: minimum upper esophageal stasis) and abdominal xray (result: no bowel obstruction). Pt underwent gastric emptying study 9/26, pending results.

## 2020-09-28 NOTE — PROGRESS NOTE ADULT - SUBJECTIVE AND OBJECTIVE BOX
GASTROENTEROLOGY PROGRESS NOTE  Patient seen and examined at bedside. Having rhythmic movements of the upper extremities, and is having some teeth chattering and drooling. Reportedly having similar events o/n. Once episode broken, she states she does have some abdominal pain w/ the shaking.     PERTINENT REVIEW OF SYSTEMS:  CONSTITUTIONAL: No weakness, fevers or chills  HEENT: No visual changes; No vertigo or throat pain   GASTROINTESTINAL: As above.  NEUROLOGICAL: No numbness or weakness  SKIN: No itching, burning, rashes, or lesions     Allergies    No Known Allergies    Intolerances    erythromycin (Stomach Upset)    MEDICATIONS:  MEDICATIONS  (STANDING):  buPROPion XL . 300 milliGRAM(s) Oral daily  chlorhexidine 2% Cloths 1 Application(s) Topical <User Schedule>  enoxaparin Injectable 40 milliGRAM(s) SubCutaneous every 24 hours  ivabradine 5 milliGRAM(s) Oral two times a day  lactated ringers. 1000 milliLiter(s) (60 mL/Hr) IV Continuous <Continuous>  lactated ringers. 1000 milliLiter(s) (100 mL/Hr) IV Continuous <Continuous>  pyridostigmine 60 milliGRAM(s) Oral three times a day  thyroid 15 milliGRAM(s) Oral <User Schedule>    MEDICATIONS  (PRN):  acetaminophen    Suspension .. 650 milliGRAM(s) Oral every 6 hours PRN Temp greater or equal to 38C (100.4F), Moderate Pain (4 - 6), Severe Pain (7 - 10)  diphenhydrAMINE   Injectable 25 milliGRAM(s) IV Push at bedtime PRN Insomnia  gabapentin 100 milliGRAM(s) Oral three times a day PRN muscle spasm    Vital Signs Last 24 Hrs  T(C): 36.7 (28 Sep 2020 09:31), Max: 36.9 (27 Sep 2020 23:16)  T(F): 98 (28 Sep 2020 09:31), Max: 98.4 (27 Sep 2020 23:16)  HR: 98 (28 Sep 2020 09:31) (60 - 98)  BP: 125/84 (28 Sep 2020 09:31) (112/76 - 148/91)  BP(mean): --  RR: 16 (28 Sep 2020 09:31) (14 - 16)  SpO2: 98% (28 Sep 2020 09:31) (97% - 100%)    09-27 @ 07:01 - 09-28 @ 07:00  --------------------------------------------------------  IN: 2810 mL / OUT: 0 mL / NET: 2810 mL      PHYSICAL EXAM:    General: Well developed; well nourished; in no acute distress  HEENT: MMM, conjunctiva and sclera clear  Gastrointestinal: Soft non-tender non-distended; Normal bowel sounds; No hepatosplenomegaly. No rebound or guarding  Skin: Warm and dry. No obvious rash    LABS:                        11.1   3.78  )-----------( 170      ( 28 Sep 2020 07:28 )             34.1     09-28    138  |  104  |  6<L>  ----------------------------<  94  3.6   |  26  |  0.57    Ca    8.9      28 Sep 2020 07:27  Phos  3.3     09-28  Mg     1.7     09-28                        RADIOLOGY & ADDITIONAL STUDIES:  Reviewed

## 2020-09-28 NOTE — PROGRESS NOTE ADULT - PROBLEM SELECTOR PLAN 1
Pt has hx of dysautonomia c/b POTS [place] [facility] [place] Now follows with Dr. Najjar who has referred her for this 2nd round of IVIG; Dr. Houston currently following pt at [facility]. Pt was found this am having automatism like rnx in RUE/LUE, LLE and teeth chattering. Pt still having difficulty sitting up in bed due to muscle spasms. Pt has persistent trouble swallowing; s/p modified barium swallow and abdominal xray - both negative for abnormalities. Pt underwent gastric emptying study on 9/26.   - f/u gastric emptying study results  - Pt completed IVIG therapy; discontinue IVIG (Gammagard) and subsequent LR, Tylenol and Benadryl  - c/w Pyridostigmine 60mg PO  - c/w home Gabapentin 100mg TID PRN (takes only for muscle spams as needed). Pt has hx of dysautonomia c/b POTS treated with 1 round of IVIG in September 2018 at [facility] [place]. Now follows with Dr. Najjar who has referred her for this 2nd round of IVIG; Dr. Houston currently following pt at [facility]. Pt was found this am having automatism like rnx in RUE/LUE, LLE and teeth chattering. Pt still having difficulty sitting up in bed due to muscle spasms. Pt has persistent trouble swallowing; s/p modified barium swallow and abdominal xray - both negative for abnormalities. Pt underwent gastric emptying study on 9/26.   - f/u gastric emptying study results  - Pt completed IVIG therapy; discontinue IVIG (Gammagard) and subsequent LR, Tylenol and Benadryl  - c/w Pyridostigmine 60mg PO  - c/w home Gabapentin 100mg TID PRN (takes only for muscle spasms as needed).

## 2020-09-28 NOTE — DISCHARGE NOTE PROVIDER - NSDCFUADDAPPT_GEN_ALL_CORE_FT
Please follow up with neurologist, Dr. Houston on ??    Please follow up with gastroenterologist Please follow up with neurologist, Dr. Najjar on an outpatient basis.    Please follow up with neurologist, Dr. Najjar on an outpatient basis.     Please follow up with your outpatient gastroenterologist in [facility].

## 2020-09-28 NOTE — PROGRESS NOTE ADULT - SUBJECTIVE AND OBJECTIVE BOX
INTERVAL HPI/OVERNIGHT EVENTS:  Patient was seen and examined at bedside. Overnight patient had automatisms/tic-like repetitive movements of both wrists and right leg w/ teeth chattering.      VITAL SIGNS:  T(F): 98.4 (09-27-20 @ 23:21)  HR: 72 (09-27-20 @ 23:21)  BP: 121/84 (09-27-20 @ 23:21)  RR: 14 (09-27-20 @ 23:21)  SpO2: 100% (09-27-20 @ 23:21)  Wt(kg): --    PHYSICAL EXAM:    Constitutional: WDWN, NAD  HEENT: PERRL, EOMI, sclera non-icteric, neck supple, trachea midline, no masses, no JVD, MMM, good dentition  Respiratory: CTA b/l, good air entry b/l, no wheezing, no rhonchi, no rales, without accessory muscle use and no intercostal retractions  Cardiovascular: RRR, normal S1S2, no M/R/G  Gastrointestinal: soft, NTND, no masses palpable, BS normal  Extremities: Warm, well perfused, pulses equal bilateral upper and lower extremities, no edema, no clubbing  Neurological: AAOx3, CN Grossly intact  Skin: Normal temperature, warm, dry    MEDICATIONS  (STANDING):  buPROPion XL . 300 milliGRAM(s) Oral daily  chlorhexidine 2% Cloths 1 Application(s) Topical <User Schedule>  enoxaparin Injectable 40 milliGRAM(s) SubCutaneous every 24 hours  ivabradine 5 milliGRAM(s) Oral two times a day  lactated ringers. 1000 milliLiter(s) (100 mL/Hr) IV Continuous <Continuous>  lactated ringers. 1000 milliLiter(s) (60 mL/Hr) IV Continuous <Continuous>  pyridostigmine 60 milliGRAM(s) Oral three times a day  thyroid 15 milliGRAM(s) Oral <User Schedule>    MEDICATIONS  (PRN):  acetaminophen    Suspension .. 650 milliGRAM(s) Oral every 6 hours PRN Temp greater or equal to 38C (100.4F), Moderate Pain (4 - 6), Severe Pain (7 - 10)  diphenhydrAMINE   Injectable 25 milliGRAM(s) IV Push at bedtime PRN Insomnia  gabapentin 100 milliGRAM(s) Oral three times a day PRN muscle spasm      Allergies    No Known Allergies    Intolerances    erythromycin (Stomach Upset)      LABS:                        11.1   3.78  )-----------( 170      ( 28 Sep 2020 07:28 )             34.1     09-28    138  |  104  |  6<L>  ----------------------------<  94  3.6   |  26  |  0.57    Ca    8.9      28 Sep 2020 07:27  Phos  3.3     09-28  Mg     1.7     09-28            RADIOLOGY & ADDITIONAL TESTS:  Reviewed

## 2020-09-28 NOTE — PROGRESS NOTE ADULT - ASSESSMENT
45F with PMH of POTS (postural orthostatic tachycardia syndrome), dysautonomia, small fiber neuropathy, and Hashimoto's thyroiditis presents for scheduled IVIG therapy (referred by Dr. Najjar) for her dysautonomia. Pt experiences intermittent flares of her dysautonomia which have increased in severity this year; her current flare began 16 weeks ago, at the start of which she felt like she was about to faint, lay down in bed (which had usually alleviated her symptoms during prior flares), and consequently experienced tonic muscle spasms as well as the sensation that her "brain was frying". GI consulted for dysphagia symptoms, and esophageal stasis on MBS.     #Dysphagia  has had extensive outpatient w/u w/ manometry showing 30% failed swallows, normal EGD, normal gastric emptying study, and normal esophagram  however, given the intermittent nature of her symptoms and mainly the finding of early satiety, obtain gastric emptying study to evaluate transit time  - AXR with no bowel obstruction. Unclear for stool burden given retained contrast from MBS. Trial of stool softners: miralax daily  - f/u gastric emptying scan    Alexa Cavazos MD  PGY-4, Gastroenterology Fellow  pager: [phone]

## 2020-09-28 NOTE — PROGRESS NOTE ADULT - ATTENDING COMMENTS
Reviewed recent events  This morning had another episode of limb shaking, all 4 limbs, lasting over an hour, with intact consciousness - not consistent with seizure, more likely nonepileptic pseudoseizure  she has had multiple EEGs in the past which have been unremarkable (most recent February at [facility])  completed 5 days of IVIG  plan to d/c back to [facility] tomorrow

## 2020-09-28 NOTE — PROGRESS NOTE ADULT - PROBLEM SELECTOR PLAN 2
Pt has known hx of small fiber neuropathy. Following Barium Swallow oropharyngeal swallow was clinically judged to be WFL and without risk of aspiration.  - c/w Wellbutrin 300mg daily.   - c/w Lactated ringers. IV 1000 milliLiter(s) infused at 6mL/hr for symptomatic management  - c/w acetaminophen 650 milligrams PO every 6 hrs prn for pain   - Consult GI and Nutrition for further evaluation and malnutrition.

## 2020-09-28 NOTE — DISCHARGE NOTE PROVIDER - CARE PROVIDERS DIRECT ADDRESSES
[email or web address]

## 2020-09-28 NOTE — DISCHARGE NOTE PROVIDER - HOSPITAL COURSE
Patient is __ yo M/F with past medical history of _____  Presented with _____, found to have _____  Problem List/Main Diagnoses (system-based):   Inpatient treatment course:   New medications:   Labs to be followed outpatient:   Exam to be followed outpatient:    Patient is a 46 yo F with past medical history of POTS, dysautonomia, small fiber neuropathy and Hashimoto's thyroiditis  Presented with scheduled IVIG therapy as a second round treatment for her dysautonomia (9/23-9/27).   Problem List/Main Diagnoses (system-based):   1. Dysautonomia  2. Small fiber neuropathy  3. Chest pain  4. Cellulitis  5. Hashimoto's thyroiditis  5. Prophylactic measure  Inpatient treatment course:   1. Pt has a hx of dysautonomia c/b POTS treated with one round of IVIG therapy in September 2018 at [facility] [place]. Now followed with Dr. Najjar who has referred her for a second round of IVIG; Dr. Houston continued to follow pt at [facility]. Pt completed IVIG therapy that was pretreated with Tylenol 1000 milligrams IV and Benadryl 25 milligrams IV; also received 500 milligrams of LR bolus pre and post therapy. Pt reported difficulty swallowing throughout stay at [facility] due to muscles weakness. A abd x-ray and barium swallow were performed; x-ray showed no evidence of abnormalities or obstruction and the barium swallow showed no abnormalities or risk of aspiration. Pt also underwent gastric emptying studying on 9/26 with results pending. Overnight and into the morning on 9/28, pt experienced automatism/ "tick-like" movements. Pt received Pyridostigmine 60mg PO and Gabapentin 100mg TID PRN (for muscle spasms as needed).  2. Pt has a know hx of small fiber neuropathy. Following the barium oropharyngeal swallow study and clinical judgement, pt deemed to be WFL and without risk of aspiration. Pt was placed of Wellbutrin 300mg daily, Lactated ringers 1000 milliliters IV at 6mL/hr for symptomatic management, and 650 milligrams PO every 6 hrs prn for pain.  3. Pt reported chest tightness that felt like "someone is sitting" on her and difficulty breathing. Pt received a 12 Lead ECG on 9/22 that showed normal sinus rhythm with non-specific ST-T abnormalities. Troponin T was also measured on 9/22 and results were negative. Respiratory therapists were consulted on 9/22 and measured a negative inspiratory force of -30 with fair effort. Given that the pt recalls a history of similar symptoms, no cardiac risk factors noted, there was no further workup or treatment performed.   4. Pt reports completion of treatment for L hallux cellulitis with 9 days of Keflex 500mg TID. Gege reported that pt was prescribed 250mg QID for 7 days 9/14-9/29. Dr. Houston recommended continuing IVIG treatment given the risk is low for progression of cellulitis. Pt felt that cellulites did not resolve so abx were extended for another 7 days. However, pt refused the Keflex 500mg QID so treatment was stopped.   5. Pt has a history of Hashimoto's thyroiditis. She wanted to continue her home thyroid medication and declined Synthroid. Pt was placed on [place] Thyroid 15 milligrams PO every 1 day (06:00, 12:00, 22:00).  6. Pt was placed on prophylactic measures throughout hospital stay. She received Enoxaparin injectable 40 milligrams every 24hrs.   New medications:   Labs to be followed outpatient:   Exam to be followed outpatient:    Patient is a 44 yo F with past medical history of POTS, dysautonomia, small fiber neuropathy and Hashimoto's thyroiditis  Presented with scheduled IVIG therapy as a second round treatment for her dysautonomia (9/23-9/27).     Problem List/Main Diagnoses (system-based):   1. Dysautonomia  2. Small fiber neuropathy  3. Chest pain  4. Cellulitis  5. Hashimoto's thyroiditis    Inpatient treatment course:   1. Dysautonomia:  Pt has a hx of dysautonomia c/b POTS treated with one round of IVIG therapy in September 2018 at [facility] [place]. Now followed with Dr. Najjar who has referred her for a second round of IVIG; Dr. Houston continued to follow pt at [facility]. Pt completed IVIG therapy that was pretreated with Tylenol 1000 milligrams IV and Benadryl 25 milligrams IV; also received 500 milligrams of LR bolus pre and post therapy. Pt reported difficulty swallowing throughout stay at [facility] due to muscles weakness. A abd x-ray and barium swallow were performed; x-ray showed no evidence of abnormalities or obstruction and the barium swallow showed no abnormalities or risk of aspiration. Pt also underwent gastric emptying studying on 9/26 with results pending. Overnight and into the morning on 9/28, pt experienced automatism/ "tick-like" movements. Pt received Pyridostigmine 60mg PO and Gabapentin 100mg TID PRN (for muscle spasms as needed).    2. Small fiber neuropathy  Pt has a know hx of small fiber neuropathy. Following the barium oropharyngeal swallow study and clinical judgement, pt deemed to be WFL and without risk of aspiration. Pt was placed of Wellbutrin 300mg daily, Lactated ringers 1000 milliliters IV at 6mL/hr for symptomatic management, and 650 milligrams PO every 6 hrs prn for pain.    3. Chest pain   Pt reported chest tightness that felt like "someone is sitting" on her and difficulty breathing. Pt received a 12 Lead ECG on 9/22 that showed normal sinus rhythm with non-specific ST-T abnormalities. Troponin T was also measured on 9/22 and results were negative. Respiratory therapists were consulted on 9/22 and measured a negative inspiratory force of -30 with fair effort. Given that the pt recalls a history of similar symptoms, no cardiac risk factors noted, there was no further workup or treatment performed.   4. Cellulitis  Pt reports completion of treatment for L hallux cellulitis with 9 days of Keflex 500mg TID. Gege reported that pt was prescribed 250mg QID for 7 days 9/14-9/29. Dr. Houston recommended continuing IVIG treatment given the risk is low for progression of cellulitis. Pt felt that cellulites did not resolve so abx were extended for another 7 days. However, pt refused the Keflex 500mg QID so treatment was stopped.     5. Hashimoto's thyroiditis   Pt has a history of Hashimoto's thyroiditis. She wanted to continue her home thyroid medication and declined Synthroid. Pt was placed on [place] Thyroid 15 milligrams PO every 1 day (06:00, 12:00, 22:00).    New medications: NONE  Labs to be followed outpatient: NONE  Exam to be followed outpatient: NONE   Patient is a 44 yo F with past medical history of POTS, dysautonomia, small fiber neuropathy and Hashimoto's thyroiditis  Presented with scheduled IVIG therapy as a second round treatment for her dysautonomia (9/23-9/27).     Problem List/Main Diagnoses (system-based):   1. Dysautonomia  2. Small fiber neuropathy  3. Chest pain  4. Cellulitis  5. Hashimoto's thyroiditis    Inpatient treatment course:   1. Dysautonomia:  Pt has a hx of dysautonomia c/b POTS treated with one round of IVIG therapy in September 2018 at [facility] [place]. Now followed with Dr. Najjar who has referred her for a second round of IVIG; Dr. Houston continued to follow pt at [facility]. Pt completed IVIG therapy that was pretreated with Tylenol 1000 milligrams IV and Benadryl 25 milligrams IV; also received 500 milligrams of LR bolus pre and post therapy. Pt reported difficulty swallowing throughout stay at [facility] due to muscles weakness. A abd x-ray and barium swallow were performed; x-ray showed no evidence of abnormalities or obstruction and the barium swallow showed no abnormalities or risk of aspiration. Pt also underwent gastric emptying studying on 9/26 that revealed delayed emptying at 4hrs. Outpatient testing at another facility (esophagram & manometry study) taken into acct w/ delayed gastric emptying suggest upper GI gastric dysmotility. Overnight and into the morning on 9/28, pt experienced automatism/ "tick-like" movements. Pt received Pyridostigmine 60mg PO and Gabapentin 100mg TID PRN (for muscle spasms as needed).  Pt should follow up w/ gastroenterology as an outpatient.   GI believes gastric dysmotility is related to dysautonomia & neuropathy; anticipate that GI symptoms will resolve once neurological symptoms are better under control.     2. Small fiber neuropathy  Pt has a know hx of small fiber neuropathy. Following the barium oropharyngeal swallow study and clinical judgement, pt deemed to be WFL and without risk of aspiration. Pt was placed of Wellbutrin 300mg daily, Lactated ringers 1000 milliliters IV at 6mL/hr for symptomatic management, and 650 milligrams PO every 6 hrs prn for pain were administered.   Pt should continue taking neurontin 100mg 3X/day.    3. Chest pain   Pt reported chest tightness that felt like "someone is sitting" on her and difficulty breathing. Pt received a 12 Lead ECG on 9/22 that showed normal sinus rhythm with non-specific ST-T abnormalities. Troponin T was also measured on 9/22 and results were negative. Respiratory therapists were consulted on 9/22 and measured a negative inspiratory force of -30 with fair effort. Given that the pt recalls a history of similar symptoms, no cardiac risk factors noted, there was no further workup or treatment performed.     4. Cellulitis  Pt reports completion of treatment for L hallux cellulitis with 9 days of Keflex 500mg TID. Gege reported that pt was prescribed 250mg QID for 7 days 9/14-9/29. Dr. Houston recommended continuing IVIG treatment given the risk is low for progression of cellulitis. Pt felt that cellulites did not resolve so abx were extended for another 7 days. However, pt refused the Keflex 500mg QID so treatment was stopped.     5. Hashimoto's thyroiditis   Pt has a history of Hashimoto's thyroiditis. She wanted to continue her home thyroid medication and declined Synthroid. Pt was placed on [place] Thyroid 15 milligrams PO 3X/day.     New medications: NONE  Labs to be followed outpatient: NONE  Exam to be followed outpatient: NONE   Patient is a 44 yo F with past medical history of POTS, dysautonomia, small fiber neuropathy and Hashimoto's thyroiditis  Presented with scheduled IVIG therapy as a second round treatment for her dysautonomia (9/23-9/27).     Problem List/Main Diagnoses (system-based):   1. Dysautonomia  2. Small fiber neuropathy  3. Chest pain  4. Cellulitis  5. Hashimoto's thyroiditis    Inpatient treatment course:   1. Dysautonomia:  Pt has a hx of dysautonomia c/b POTS treated with one round of IVIG therapy in September 2018 at [facility] [place]. Now followed with Dr. Najjar who has referred her for a second round of IVIG; Dr. Houston continued to follow pt at [facility]. Pt completed IVIG therapy that was pretreated with Tylenol 1000 milligrams IV and Benadryl 25 milligrams IV; also received 500 milligrams of LR bolus pre and post therapy. Pt reported difficulty swallowing throughout stay at [facility] due to muscles weakness. A abd x-ray and barium swallow were performed; x-ray showed no evidence of abnormalities or obstruction and the barium swallow showed no abnormalities or risk of aspiration. Pt also underwent gastric emptying studying on 9/26 that revealed delayed emptying at 4hrs. Outpatient testing at another facility (esophagram & manometry study) taken into acct w/ delayed gastric emptying suggest upper GI gastric dysmotility. Overnight and into the morning on 9/28, pt experienced automatism/ "tick-like" movements. Pt received Pyridostigmine 60mg PO and Gabapentin 100mg TID PRN (for muscle spasms as needed).  Pt should follow up w/ gastroenterology as an outpatient.   GI believes gastric dysmotility is related to dysautonomia & neuropathy; anticipate that GI symptoms will resolve once neurological symptoms are better under control.     2. Small fiber neuropathy  Pt has a know hx of small fiber neuropathy. Following the barium oropharyngeal swallow study and clinical judgement, pt deemed to be WFL and without risk of aspiration. Pt was placed of Wellbutrin 300mg daily, Lactated ringers 1000 milliliters IV at 6mL/hr for symptomatic management, and 650 milligrams PO every 6 hrs prn for pain were administered.   Pt should continue taking neurontin 100mg 3X/day.    3. Chest pain   Pt reported chest tightness that felt like "someone is sitting" on her and difficulty breathing. Pt received a 12 Lead ECG on 9/22 that showed normal sinus rhythm with non-specific ST-T abnormalities. Troponin T was also measured on 9/22 and results were negative. Respiratory therapists were consulted on 9/22 and measured a negative inspiratory force of -30 with fair effort. Given that the pt recalls a history of similar symptoms, no cardiac risk factors noted, there was no further workup or treatment performed.     4. Cellulitis  Pt reports completion of treatment for L hallux cellulitis with 9 days of Keflex 500mg TID. Gege reported that pt was prescribed 250mg QID for 7 days 9/14-9/29. Dr. Houston recommended continuing IVIG treatment given the risk is low for progression of cellulitis. On continued examination, cellulitis appeared to resolve so Keflex was discontinued.     5. Hashimoto's thyroiditis   Pt has a history of Hashimoto's thyroiditis. She wanted to continue her home thyroid medication and declined Synthroid. Pt was placed on [place] Thyroid 15 milligrams PO 3X/day.     New medications: NONE  Labs to be followed outpatient: NONE  Exam to be followed outpatient: NONE   Patient is a 46 yo F with past medical history of POTS, dysautonomia, small fiber neuropathy and Hashimoto's thyroiditis  Presented with scheduled IVIG therapy as a second round treatment for her dysautonomia (9/23-9/27).     Problem List/Main Diagnoses (system-based):   1. Dysautonomia  2. Small fiber neuropathy  3. Chest pain  4. Cellulitis  5. Hashimoto's thyroiditis    Inpatient treatment course:   1. Dysautonomia:  Pt has a hx of dysautonomia c/b POTS treated with one round of IVIG therapy in September 2018 at [facility] [place]. Now followed with Dr. Najjar who has referred her for a second round of IVIG; Dr. Houston continued to follow pt at [facility]. Pt completed IVIG therapy that was pretreated with Tylenol 1000 milligrams IV and Benadryl 25 milligrams IV; also received 500 milligrams of LR bolus pre and post therapy. Pt reported difficulty swallowing throughout stay at [facility] due to muscles weakness. A abd x-ray and barium swallow were performed; x-ray showed no evidence of abnormalities or obstruction and the barium swallow showed no abnormalities or risk of aspiration. Pt also underwent gastric emptying studying on 9/26 that revealed delayed emptying at 4hrs. Outpatient testing at another facility (esophagram & manometry study) taken into acct w/ delayed gastric emptying suggest upper GI gastric dysmotility. GI believes gastric dysmotility is related to dysautonomia & neuropathy; anticipate that GI symptoms will resolve once neurological symptoms are better under control. Pt should follow up w/ gastroenterology as an outpatient.      2. Small fiber neuropathy  Pt has a know hx of small fiber neuropathy. Following the barium oropharyngeal swallow study and clinical judgement, pt deemed to be WFL and without risk of aspiration. Pt was placed of Wellbutrin 300mg daily, Lactated ringers 1000 milliliters IV at 6mL/hr for symptomatic management, and 650 milligrams PO every 6 hrs prn for pain were administered.   Pt should continue taking neurontin 100mg 3X/day.    3. Chest pain   Pt reported chest tightness that felt like "someone is sitting" on her and difficulty breathing. Pt received a 12 Lead ECG on 9/22 that showed normal sinus rhythm with non-specific ST-T abnormalities. Troponin T was also measured on 9/22 and results were negative. Respiratory therapists were consulted on 9/22 and measured a negative inspiratory force of -30 with fair effort. Given that the pt recalls a history of similar symptoms, no cardiac risk factors noted, there was no further workup or treatment performed.     4. Cellulitis  Pt reports completion of treatment for L hallux cellulitis with 9 days of Keflex 500mg TID. Gege reported that pt was prescribed 250mg QID for 7 days 9/14-9/29. Dr. Houston recommended continuing IVIG treatment given the risk is low for progression of cellulitis. On continued examination, cellulitis appeared to resolve so Keflex was discontinued.     5. Hashimoto's thyroiditis   Pt has a history of Hashimoto's thyroiditis. She wanted to continue her home thyroid medication and declined Synthroid. Pt was placed on [place] Thyroid 15 milligrams PO 3X/day.     Overnight and into the morning on 9/28, pt had two episodes of limb shaking, lasting 30 min - 1 hr, was completely conscious throughout. A video of one of the episodes was reviewed by the attending and was determined not to be consistent with a seizure, and likely was a pseudoseizure. She has had these episodes in the past and has had multiple EEGs which were normal. No further workup is warranted at this time. Patient is stable for discharge, however was refusing discharge until "we figure out what is going on". It was explained to her that she has a long and complex medical history, and her workup and treatment will continue as an outpatient, but there is no further testing or treatment that needs to be done as an inpatient. After refusing discharge, she was given a 24 hour notice.     New medications: NONE  Labs to be followed outpatient: NONE  Exam to be followed outpatient: NONE   Patient is a 44 yo F with past medical history of POTS, dysautonomia, small fiber neuropathy and Hashimoto's thyroiditis  Presented with scheduled IVIG therapy as a second round treatment for her dysautonomia (9/23-9/27).     Problem List/Main Diagnoses (system-based):   1. Dysautonomia  2. Small fiber neuropathy  3. Chest pain  4. Cellulitis  5. Hashimoto's thyroiditis    Inpatient treatment course:   1. Dysautonomia:  Pt has a hx of dysautonomia c/b POTS treated with one round of IVIG therapy in September 2018 at [facility] [place]. Now followed with Dr. Najjar who has referred her for a second round of IVIG; Dr. Houston continued to follow pt at [facility]. Pt completed IVIG therapy that was pretreated with Tylenol 1000 milligrams IV and Benadryl 25 milligrams IV; also received 500 milligrams of LR bolus pre and post therapy. Pt reported difficulty swallowing throughout stay at [facility] due to muscles weakness. A abd x-ray and barium swallow were performed; x-ray showed no evidence of abnormalities or obstruction and the barium swallow showed no abnormalities or risk of aspiration. Pt also underwent gastric emptying studying on 9/26 that revealed delayed emptying at 4hrs. Outpatient testing at another facility (esophagram & manometry study) taken into acct w/ delayed gastric emptying suggest upper GI gastric dysmotility. GI believes gastric dysmotility is related to dysautonomia & neuropathy; anticipate that GI symptoms will resolve once neurological symptoms are better under control. Pt should follow up w/ gastroenterology as an outpatient.      2. Small fiber neuropathy  Pt has a know hx of small fiber neuropathy. Following the barium oropharyngeal swallow study and clinical judgement, pt deemed to be WFL and without risk of aspiration. Pt was placed of Wellbutrin 300mg daily, Lactated ringers 1000 milliliters IV at 6mL/hr for symptomatic management, and 650 milligrams PO every 6 hrs prn for pain were administered.   Pt should continue taking neurontin 100mg 3X/day.    3. Chest pain   Pt reported chest tightness that felt like "someone is sitting" on her and difficulty breathing. Pt received a 12 Lead ECG on 9/22 that showed normal sinus rhythm with non-specific ST-T abnormalities. Troponin T was also measured on 9/22 and results were negative. Respiratory therapists were consulted on 9/22 and measured a negative inspiratory force of -30 with fair effort. Given that the pt recalls a history of similar symptoms, no cardiac risk factors noted, there was no further workup or treatment performed.     4. Cellulitis  Pt reports completion of treatment for L hallux cellulitis with 9 days of Keflex 500mg TID. Gege reported that pt was prescribed 250mg QID for 7 days 9/14-9/29. Dr. Houston recommended continuing IVIG treatment given the risk is low for progression of cellulitis. On continued examination, cellulitis appeared to resolve so Keflex was discontinued.     5. Hashimoto's thyroiditis   Pt has a history of Hashimoto's thyroiditis. She wanted to continue her home thyroid medication and declined Synthroid. Pt was placed on [place] Thyroid 15 milligrams PO 3X/day.     Overnight and into the morning on 9/28, pt had two episodes of limb shaking, lasting 30 min - 1 hr, was completely conscious throughout. A video of one of the episodes was reviewed by the attending and was determined not to be consistent with a seizure, and likely was a pseudoseizure. She has had these episodes in the past and has had multiple EEGs which were normal. No further workup is warranted at this time. Patient is stable for discharge, however was refusing discharge until "we figure out what is going on". It was explained to her that she has a long and complex medical history, and her workup and treatment will continue as an outpatient, but there is no further testing or treatment that needs to be done as an inpatient. After refusing discharge, she was given a 24 hour notice.     New medications: NONE  Labs to be followed outpatient: NONE  Exam to be followed outpatient: NONE    Patient on regular diet, kosher.

## 2020-09-28 NOTE — CHART NOTE - NSCHARTNOTEFT_GEN_A_CORE
Admitting Diagnosis:   Patient is a 45y old  Female who presents with a chief complaint of IVIG infusion (28 Sep 2020 10:44)    PAST MEDICAL & SURGICAL HISTORY:  Hashimoto&#x27;s thyroiditis    Dysautonomia    Small fiber neuropathy    POTS (postural orthostatic tachycardia syndrome)    No significant past surgical history    Current Nutrition Order:  regular    PO Intake: Good (%) [   ]  Fair (50-75%) [   ] Poor (<25%) [   ]  >please see below    GI Issues:   Pt reports "tight, contracted" feeling in her stomach when she eats, reports it feels like her stomach does not let anything go down. Denies nausea feeling, reports it just "doesn't feel right"    Pain:  On pain regimen    Skin Integrity:  cellulitis of left great toe    Labs:       138  |  104  |  6<L>  ----------------------------<  94  3.6   |  26  |  0.57    Ca    8.9      28 Sep 2020 07:27  Phos  3.3       Mg     1.7         CAPILLARY BLOOD GLUCOSE    POCT Blood Glucose.: 88 mg/dL (27 Sep 2020 23:20)    Medications:  MEDICATIONS  (STANDING):  buPROPion XL . 300 milliGRAM(s) Oral daily  chlorhexidine 2% Cloths 1 Application(s) Topical <User Schedule>  enoxaparin Injectable 40 milliGRAM(s) SubCutaneous every 24 hours  ivabradine 5 milliGRAM(s) Oral two times a day  lactated ringers. 1000 milliLiter(s) (60 mL/Hr) IV Continuous <Continuous>  lactated ringers. 1000 milliLiter(s) (100 mL/Hr) IV Continuous <Continuous>  pyridostigmine 60 milliGRAM(s) Oral three times a day  thyroid 15 milliGRAM(s) Oral <User Schedule>    MEDICATIONS  (PRN):  acetaminophen    Suspension .. 650 milliGRAM(s) Oral every 6 hours PRN Temp greater or equal to 38C (100.4F), Moderate Pain (4 - 6), Severe Pain (7 - 10)  diphenhydrAMINE   Injectable 25 milliGRAM(s) IV Push at bedtime PRN Insomnia  gabapentin 100 milliGRAM(s) Oral three times a day PRN muscle spasm    Admission Anthropometrics:  Height: 59" Weight: 91lbs, IBW 100lbs+/-10%, %IBW 91%, BMI 18.3,    Weight:  Daily     Daily     Weight Change: Pt reports UBW in 2020 ~110-118lbs, reports wt loss unsure of amount. ABW 90lbs reflects 19lb/17% wt loss x~7months (significant). No new wts to assess please trend wts.    Estimated energy needs:   ABW used for calculations as pt between % of IBW, adjusted for malnutrition  30-35kcal/k-1442kcal  1.2-1.4g/k-58g protein  25-30ml/k-1236ml fluid    Subjective:   45F with PMH of POTS, dysautonomia, small fiber neuropathy, and Hashimoto's thyroiditis admitted for scheduled IVIG therapy as a second round of treatment for her dysautonomia (-). Pt has increased swallowing difficulty throughout hospital stay; s/p modified barium swallow (result: minimum upper esophageal stasis) and abdominal xray (result: no bowel obstruction). Pt underwent gastric emptying study , pending results. Pt seen in room, resting in bed. Pt reports continued poor PO intake. Pt reports "tight, contracted" feeling in her stomach when she eats, reports it feels like her stomach does not let anything go down. Denies nausea feeling, reports it just "doesn't feel right." Pt also reporting tightening in jaw, reports difficulty chewing/swallowing. Pt not fasting for Yom Kippur, trying to eat something for dinner. Pt reports having 3/4 chicken breakfast for lunch and applesauce. Pt ordered for calorie count -. Insufficient calorie count sheets found in chart, unable to estimated PO intake at this time. No PO intake recorded  or . Suspect pt not meeting EER 2/2 pt continues with poor PO intake. RD encouraged intake during visit, pt reports she knows she needs to eat more. Although pt initially not receptive to ONS, recommend adding Ensure clear QD (280kcal/8gpro), Ensure pudding QD (170kcal/4gpro) for pt to try and encourage PO intake. Continue to monitor GI recs. Please see recs below. Will continue to follow per RD protocol.     Previous Nutrition Diagnosis: Suspect severe PCM RT decreased PO intake 2/2 swallowing difficulty AEB significant wt loss, likely meeting <75% EER >1 month    Active [ x  ]  Resolved [   ]    If resolved, new PES:     Goal: Meet >75% EER, show no further signs/symptoms of PCM    Recommendations:  1. Continue regular diet as per slp, monitor tolerance and %PO intake  2. Add Ensure clear QD (280kcal/8gpro), Ensure pudding QD (170kcal/4gpro) for pt to try  3. Trend wts 2-3x per week  4. Monitor lytes and replete  5. Pain and bowel regimens per team  6. RD to remain available prn    Education: Encouraged intake    Risk Level: High [ x  ] Moderate [   ] Low [   ]

## 2020-09-28 NOTE — DISCHARGE NOTE PROVIDER - NSDCCPCAREPLAN_GEN_ALL_CORE_FT
PRINCIPAL DISCHARGE DIAGNOSIS  Diagnosis: Autoimmune disorder  Assessment and Plan of Treatment: You came to [facility] on 9/23 to receive a second round of IVIG treatment for your autonomic dysautonomia per the recommendation of Dr. Najjar. Your treatment lasted 4 days, receiving infusions from 9/23-9/27. You received Benadryl, Tylenol and fluids before and fluids after your treatment to prevent any nausea or discomfort. Because of your dysautonomia, you reported having difficulty swallowing foods and medication. We consulted GI and [facility] for further evaluation. [facility] saw you at bedside and requested a barium contrast esophogram to look at your musculature and see for any areas of weakness. They did not find any abnormalities. GI requested a gastric emptying study be performed, and we are waiting on results. You also reported to us having chest tightness and difficulty breathing, which you've experienced in the past. We ordered and ECG which looks are you heart rate and rhythm, and no abnormalities were seen. We then looked at your Troponin levels, a protein found in your heart muscles that can indicate heart injury. These levels were negative and no injury was seen. We also consulted Respiratory Therapy to help examine your lung function. They reported that you did not have any abnormal lung function and were not at risk of breathing food/liquids into your lungs rather than stomach. We also gave you Pyridostigmine 60mg PO to help improve your muscles strength and Gabapentin 100mg for muscle spams as needed. We also gave you      SECONDARY DISCHARGE DIAGNOSES  Diagnosis: Small fiber neuropathy  Assessment and Plan of Treatment: You reported to us of having a history of small fiber neuropathy, a condition that causes sensations of tingling. To help relieve these symptoms, we gave you Wellbutrin 300mg once a day and Lactated ringers fluids 1000 milliliters IV.    Diagnosis: Cellulitis  Assessment and Plan of Treatment: You reported to us of having cellulitis, a bacterial infection, on your L great toe. You told us you were treated for 9 days with the antibiotic Keflex 500mg once a day. However, [facility] reported that you was prescribed 250mg QID for 7 days 9/14-9/29. We asked Dr. Houston, your neuologist at [facility], his recommended for continuitng treatment. He did not think continuing the antibiotic given you IVIG would put you at any risk. We continued the 500mg Keflex, however you refused the treatment because of your difficulty swallowing pills. On examining the toe, the redness and swelling are decreasing and the infection seems to be improving.    Diagnosis: Hashimoto's thyroiditis  Assessment and Plan of Treatment: You reported to us of having Hashimoto's thyroiditis, a condition in which you have low thyroid hormones. You were being treated at home with [place] Thyroid and refused our medication of Synthyroid. We were able to continue your home medication of  [place] Thyroid 15 milligrams once a day.    Diagnosis: Prophylactic measure  Assessment and Plan of Treatment: While staying at the hospital for a prolonged period of time, we place all our patients on medication to prevent blood clots from forming. These clots are more at risk for forming when someone is laying down and not moving for longer periods of time. We gave you Enoxaparin 40 milligrams injectable medication once a day. This medication helps prevent and stop blood from clotting and getting suck in your vessels.     PRINCIPAL DISCHARGE DIAGNOSIS  Diagnosis: Autoimmune disorder  Assessment and Plan of Treatment: You came to [facility] on 9/23 to receive a second round of IVIG treatment for your autonomic dysautonomia per the recommendation of Dr. Najjar. Your treatment lasted 4 days, receiving infusions from 9/23-9/27. You received Benadryl, Tylenol and fluids before and fluids after your treatment to prevent any nausea or discomfort. Because of your dysautonomia, you reported having difficulty swallowing foods and medication. We consulted GI and [facility] for further evaluation. [facility] saw you at bedside and requested a barium contrast esophogram, a test to look at your musculature and see for any areas of weakness. They did not find any abnormalities. GI requested a gastric emptying study be performed to look at how food leaves your stomach and why you may be having pain. We are waiting on the results. You also reported to us having chest tightness and difficulty breathing, which you've experienced in the past. We ordered and ECG which looks are you heart rate and rhythm, and no abnormalities were seen. We then looked at your Troponin levels, a protein found in your heart muscles that can indicate heart injury. These levels were negative and no injury was seen. We also consulted Respiratory Therapy to help examine your lung function. They reported that you did not have any abnormal lung function and were not at risk of breathing food/liquids into your lungs rather than stomach. We gave you Pyridostigmine 60mg PO to help improve your muscles strength and Gabapentin 100mg for muscle spams as needed.      SECONDARY DISCHARGE DIAGNOSES  Diagnosis: Small fiber neuropathy  Assessment and Plan of Treatment: You reported to us of having a history of small fiber neuropathy, a condition that causes sensations of tingling in your arms and legs. To help relieve these symptoms, we gave you Wellbutrin 300mg once a day and Lactated ringers fluids 1000 milliliters IV.    Diagnosis: Cellulitis  Assessment and Plan of Treatment: You reported to us of having cellulitis, a bacterial infection, on your L great toe. You told us you were treated for 9 days with the antibiotic Keflex 500mg once a day. However, [facility] reported that you was prescribed 250mg QID for 7 days 9/14-9/29. We asked Dr. Houston, your neuologist at [facility], his recommended for continuitng treatment. He did not think continuing the antibiotic given your IVIG treatment would put you at any risk. We continued the 500mg Keflex, however you refused the treatment because of your difficulty swallowing pills. On examining the toe, the redness and swelling were decreasing and the infection seems to be improving.    Diagnosis: Hashimoto's thyroiditis  Assessment and Plan of Treatment: You reported to us of having Hashimoto's thyroiditis, a condition in which you have low thyroid hormones. You were being treated at home with [place] Thyroid and refused our medication of Synthyroid. We were able to continue your home medication of  [place] Thyroid 15 milligrams once a day.    Diagnosis: Prophylactic measure  Assessment and Plan of Treatment: While staying at the hospital for a prolonged period of time, we place all our patients on medication to prevent blood clots from forming. These clots are at risk for forming when someone is laying down and not moving for longer periods of time. We gave you Enoxaparin 40 milligrams injectable medication once a day. This medication helps prevent and stop blood from clotting and getting suck in your vessels.     PRINCIPAL DISCHARGE DIAGNOSIS  Diagnosis: Autoimmune disorder  Assessment and Plan of Treatment: You came to [facility] on 9/23 to receive a second round of IVIG treatment for your autonomic dysautonomia per the recommendation of Dr. Najjar. Your treatment lasted 5 days, receiving infusions from 9/23-9/27. You received Benadryl, Tylenol and fluids before and fluids after your treatment to prevent any nausea, headaches and/or other symptoms that might have caused discomfort. Because of your dysautonomia, you reported having difficulty swallowing foods/fluids and oral medications. We consulted Gastroenterology and Speech Pathologists for further evaluation. A speech & swallow evaluation was done at bedside following which a modified barium esophogram was requested for further work-up to evaluate potential reasons as to why you had difficulty swallowing; this was a special x-ray test to look at your oral and esophageal musculature duign swallowing to look for any areas of weakness. The modified barium swallow study showed did not show any significant abnormalities. GI requested a gastric emptying study be performed to look at how food leaves your stomach and why you may be having abdominal pain after eating small amounts of food. We are waiting on the results. You also reported to us having chest tightness and difficulty breathing, which you've experienced in the past related to occasional muscle spasms. We ordered an electrocardiogram which looks at your heart rate and rhythm; no abnormal signals were seen. We then looked at your Troponin levels, a protein found in your heart muscles that can indicate heart injury; these levels were within the normal range. We also consulted Respiratory Therapy to help examine your lung function. They reported that you did not have any abnormal lung function and were not at risk of breathing food/liquids into your lungs rather than your stomach. Please follow up with Dr. Najjar on an outpatient basis for continued care.      SECONDARY DISCHARGE DIAGNOSES  Diagnosis: Prophylactic measure  Assessment and Plan of Treatment: While staying at the hospital for a prolonged period of time, we place all our patients on medication to prevent blood clots from forming. These clots are at risk for forming when someone is laying down and not moving for longer periods of time. We gave you Enoxaparin 40 milligrams injectable medication once a day. This medication helps prevent and stop blood from clotting and getting suck in your vessels.    Diagnosis: Hashimoto's thyroiditis  Assessment and Plan of Treatment: You reported to us of having Hashimoto's thyroiditis, a condition in which you have low thyroid hormones. You were being treated at home with [place] Thyroid and refused our medication of Synthyroid. We were able to continue your home medication of  [place] Thyroid 15 milligrams once a day.    Diagnosis: Cellulitis  Assessment and Plan of Treatment: You reported to us of having cellulitis, a bacterial infection, on your L great toe. [facility] reported that you were prescribed 250mg QID for 7 days 9/14-9/21, which was then extended for an additional week; upon arrival at [facility] you had only taken 2 days worth out of those additional 7 days. We asked Dr. Houston, your neuologist at [facility], his recommendation for continuing this antibiotic. He did not think continuing the antibiotic given your IVIG treatment would put you at any risk. We continued the 500mg Keflex until 9/24, however you refused any additional oral tablets because of your difficulty swallowing pills. On continued examination of the left great toe, the redness and swelling had resolved and there were no other clinical signs of infection - prolonged antibiotic use were no longer warranted.     PRINCIPAL DISCHARGE DIAGNOSIS  Diagnosis: Autoimmune disorder  Assessment and Plan of Treatment: You came to [facility] on 9/23 to receive a second round of IVIG treatment for your autonomic dysautonomia per the recommendation of Dr. Najjar. Your treatment lasted 5 days, receiving infusions from 9/23-9/27. You received Benadryl, Tylenol and fluids before and fluids after your treatment to prevent any nausea, headaches and/or other symptoms that might have caused discomfort. Because of your dysautonomia, you reported having difficulty swallowing foods/fluids and oral medications. We consulted Gastroenterology and Speech Pathologists for further evaluation. A speech & swallow evaluation was done at bedside following which a modified barium esophogram was requested for further work-up to evaluate potential reasons as to why you had difficulty swallowing; this was a special x-ray test to look at your oral and esophageal musculature duign swallowing to look for any areas of weakness. The modified barium swallow study showed did not show any significant abnormalities. GI requested a gastric emptying study be performed to look at how food leaves your stomach and why you may be having abdominal pain after eating small amounts of food. We are waiting on the results. You also reported to us having chest tightness and difficulty breathing, which you've experienced in the past related to occasional muscle spasms. We ordered an electrocardiogram which looks at your heart rate and rhythm; no abnormal signals were seen. We then looked at your Troponin levels, a protein found in your heart muscles that can indicate heart injury; these levels were within the normal range. We also consulted Respiratory Therapy to help examine your lung function. They reported that you did not have any abnormal lung function and were not at risk of breathing food/liquids into your lungs rather than your stomach. Please follow up with Dr. Najjar on an outpatient basis for continued care.      SECONDARY DISCHARGE DIAGNOSES  Diagnosis: Gastric dysmotility  Assessment and Plan of Treatment: During your hospital stay you shared with us that at another health facility you had an esophageal manometry study, esophogram (barium study) and small intestine bowel overgrowth. During your stay at [facility] you had a modified barium swallow study and gastric emptying exam. The gastric emptying study showed delayed emptying at 4 hours; it is believed that the reduction in gastric emptying is related to your nuerological symptoms. Please follow up with gastroenterology as an outpatient for symptom management.    Diagnosis: Prophylactic measure  Assessment and Plan of Treatment: While staying at the hospital for a prolonged period of time, we place all our patients on medication to prevent blood clots from forming. These clots are at risk for forming when someone is laying down and not moving for longer periods of time. We gave you Enoxaparin 40 milligrams injectable medication once a day. This medication helps prevent and stop blood from clotting and getting suck in your vessels.    Diagnosis: Hashimoto's thyroiditis  Assessment and Plan of Treatment: You reported to us of having Hashimoto's thyroiditis, a condition in which you have low thyroid hormones. You were being treated at home with [place] Thyroid and refused our medication of Synthyroid. We were able to continue your home medication of  [place] Thyroid 15 milligrams once a day.    Diagnosis: Cellulitis  Assessment and Plan of Treatment: You reported to us of having cellulitis, a bacterial infection, on your L great toe. [facility] reported that you were prescribed 250mg QID for 7 days 9/14-9/21, which was then extended for an additional week; upon arrival at [facility] you had only taken 2 days worth out of those additional 7 days. We asked Dr. Houston, your neuologist at [facility], his recommendation for continuing this antibiotic. He did not think continuing the antibiotic given your IVIG treatment would put you at any risk. We continued the 500mg Keflex until 9/24, however you refused any additional oral tablets because of your difficulty swallowing pills. On continued examination of the left great toe, the redness and swelling had resolved and there were no other clinical signs of infection - prolonged antibiotic use were no longer warranted.     PRINCIPAL DISCHARGE DIAGNOSIS  Diagnosis: Autoimmune disorder  Assessment and Plan of Treatment: You came to [facility] on 9/23 to receive a second round of IVIG treatment for your autonomic dysautonomia per the recommendation of Dr. Najjar. Your treatment lasted 5 days, receiving infusions from 9/23-9/27. You received Benadryl, Tylenol and fluids before and fluids after your treatment to prevent any nausea, headaches and/or other symptoms that might have caused discomfort. Because of your dysautonomia, you reported having difficulty swallowing foods/fluids and oral medications. We consulted Gastroenterology and Speech Pathologists for further evaluation. A speech & swallow evaluation was done at bedside following which a modified barium esophogram was requested for further work-up to evaluate potential reasons as to why you had difficulty swallowing; this was a special x-ray test to look at your oral and esophageal musculature duign swallowing to look for any areas of weakness. The modified barium swallow study showed did not show any significant abnormalities. GI requested a gastric emptying study be performed to look at how food leaves your stomach and why you may be having abdominal pain after eating small amounts of food. We are waiting on the results. You also reported to us having chest tightness and difficulty breathing, which you've experienced in the past related to occasional muscle spasms. We ordered an electrocardiogram which looks at your heart rate and rhythm; no abnormal signals were seen. We then looked at your Troponin levels, a protein found in your heart muscles that can indicate heart injury; these levels were within the normal range. We also consulted Respiratory Therapy to help examine your lung function. They reported that you did not have any abnormal lung function and were not at risk of breathing food/liquids into your lungs rather than your stomach. Please follow up with Dr. Najjar on an outpatient basis for continued care.      SECONDARY DISCHARGE DIAGNOSES  Diagnosis: Gastric dysmotility  Assessment and Plan of Treatment: During your hospital stay you shared with us that at another health facility you had an esophageal manometry study, esophogram (barium study) and small intestine bowel overgrowth. During your stay at [facility] you had a modified barium swallow study and gastric emptying exam. The gastric emptying study showed delayed emptying at 4 hours; it is believed that the reduction in gastric emptying is related to your nuerological symptoms. Please follow up with gastroenterology as an outpatient for symptom management. Please continue taking at-home medications as previously prescribed.    Diagnosis: Cellulitis  Assessment and Plan of Treatment: You reported to us of having cellulitis, a bacterial infection, on your L great toe. [facility] reported that you were prescribed 250mg QID for 7 days 9/14-9/21, which was then extended for an additional week; upon arrival at [facility] you had only taken 2 days worth out of those additional 7 days. We asked Dr. Houston, your neuologist at [facility], his recommendation for continuing this antibiotic. He did not think continuing the antibiotic given your IVIG treatment would put you at any risk. We continued the 500mg Keflex until 9/24, however you refused any additional oral tablets because of your difficulty swallowing pills. On continued examination of the left great toe, the redness and swelling had resolved and there were no other clinical signs of infection - prolonged antibiotic use were no longer warranted.     PRINCIPAL DISCHARGE DIAGNOSIS  Diagnosis: Autoimmune disorder  Assessment and Plan of Treatment: You came to [facility] on 9/23 to receive a second round of IVIG treatment for your autonomic dysautonomia per the recommendation of Dr. Najjar. Your treatment lasted 5 days, receiving infusions from 9/23-9/27. You received Benadryl, Tylenol and fluids before and fluids after your treatment to prevent any nausea, headaches and/or other symptoms that might have caused discomfort. Because of your dysautonomia, you reported having difficulty swallowing foods/fluids and oral medications. We consulted Gastroenterology and Speech Pathologists for further evaluation. A speech & swallow evaluation was done at bedside following which a modified barium esophogram was requested for further work-up to evaluate potential reasons as to why you had difficulty swallowing; this was a special x-ray test to look at your oral and esophageal musculature duign swallowing to look for any areas of weakness. The modified barium swallow study showed did not show any significant abnormalities. GI requested a gastric emptying study be performed to look at how food leaves your stomach and why you may be having abdominal pain after eating small amounts of food; it showed delayed emptying at 4hrs (please refer to dysmotility diagnosis section for addn info).  You also reported to us having chest tightness and difficulty breathing, which you've experienced in the past related to occasional muscle spasms. We ordered an electrocardiogram which looks at your heart rate and rhythm; no abnormal signals were seen. We then looked at your Troponin levels, a protein found in your heart muscles that can indicate heart injury; these levels were within the normal range. We also consulted Respiratory Therapy to help examine your lung function. They reported that you did not have any abnormal lung function and were not at risk of breathing food/liquids into your lungs rather than your stomach. Please follow up with Dr. Najjar on an outpatient basis for continued care.      SECONDARY DISCHARGE DIAGNOSES  Diagnosis: Gastric dysmotility  Assessment and Plan of Treatment: During your hospital stay you shared with us that at another health facility you had an esophageal manometry study, esophogram (barium study) and small intestine bowel overgrowth. During your stay at [facility] you had a modified barium swallow study and gastric emptying exam. The gastric emptying study showed delayed emptying at 4 hours; it is believed that the reduction in gastric emptying is related to your nuerological symptoms. Please follow up with gastroenterology as an outpatient for symptom management. Please continue taking at-home medications as previously prescribed.    Diagnosis: Cellulitis  Assessment and Plan of Treatment: You reported to us of having cellulitis, a bacterial infection, on your L great toe. [facility] reported that you were prescribed 250mg QID for 7 days 9/14-9/21, which was then extended for an additional week; upon arrival at [facility] you had only taken 2 days worth out of those additional 7 days. We asked Dr. Houston, your neuologist at [facility], his recommendation for continuing this antibiotic. He did not think continuing the antibiotic given your IVIG treatment would put you at any risk. We continued the 500mg Keflex until 9/24, however you refused any additional oral tablets because of your difficulty swallowing pills. On continued examination of the left great toe, the redness and swelling had resolved and there were no other clinical signs of infection - prolonged antibiotic use were no longer warranted.

## 2020-09-29 LAB
ARSENIC BLD-MCNC: 6 UG/L — SIGNIFICANT CHANGE UP (ref 2–23)
CADMIUM SERPL-MCNC: <0.5 UG/L — SIGNIFICANT CHANGE UP (ref 0–1.2)
LEAD BLD-MCNC: <1 UG/DL — SIGNIFICANT CHANGE UP (ref 0–4)
MERCURY SERPL-MCNC: 2.4 UG/L — SIGNIFICANT CHANGE UP (ref 0–14.9)

## 2020-09-29 PROCEDURE — 99231 SBSQ HOSP IP/OBS SF/LOW 25: CPT

## 2020-09-29 PROCEDURE — 99232 SBSQ HOSP IP/OBS MODERATE 35: CPT | Mod: GC

## 2020-09-29 RX ADMIN — PYRIDOSTIGMINE BROMIDE 60 MILLIGRAM(S): 60 SOLUTION ORAL at 06:36

## 2020-09-29 RX ADMIN — SODIUM CHLORIDE 60 MILLILITER(S): 9 INJECTION, SOLUTION INTRAVENOUS at 21:53

## 2020-09-29 RX ADMIN — IVABRADINE 5 MILLIGRAM(S): 7.5 TABLET, FILM COATED ORAL at 06:36

## 2020-09-29 RX ADMIN — SODIUM CHLORIDE 60 MILLILITER(S): 9 INJECTION, SOLUTION INTRAVENOUS at 03:25

## 2020-09-29 RX ADMIN — Medication 15 MILLIGRAM(S): at 06:36

## 2020-09-29 RX ADMIN — CHLORHEXIDINE GLUCONATE 1 APPLICATION(S): 213 SOLUTION TOPICAL at 06:39

## 2020-09-29 RX ADMIN — PYRIDOSTIGMINE BROMIDE 60 MILLIGRAM(S): 60 SOLUTION ORAL at 13:36

## 2020-09-29 RX ADMIN — PYRIDOSTIGMINE BROMIDE 60 MILLIGRAM(S): 60 SOLUTION ORAL at 21:52

## 2020-09-29 RX ADMIN — Medication 10 MILLIGRAM(S): at 01:40

## 2020-09-29 RX ADMIN — Medication 15 MILLIGRAM(S): at 13:36

## 2020-09-29 RX ADMIN — BUPROPION HYDROCHLORIDE 300 MILLIGRAM(S): 150 TABLET, EXTENDED RELEASE ORAL at 11:48

## 2020-09-29 RX ADMIN — Medication 10 MILLIGRAM(S): at 00:42

## 2020-09-29 NOTE — PROGRESS NOTE ADULT - ASSESSMENT
45F with PMH of POTS (postural orthostatic tachycardia syndrome), dysautonomia, small fiber neuropathy, and Hashimoto's thyroiditis presents for scheduled IVIG therapy (referred by Dr. Najjar) for her dysautonomia. Pt experiences intermittent flares of her dysautonomia which have increased in severity this year; her current flare began 16 weeks ago, at the start of which she felt like she was about to faint, lay down in bed (which had usually alleviated her symptoms during prior flares), and consequently experienced tonic muscle spasms as well as the sensation that her "brain was frying". GI consulted for dysphagia symptoms, and esophageal stasis on MBS.     #Dysphagia  has had extensive outpatient w/u w/ manometry showing 30% failed swallows, normal EGD, normal gastric emptying study, and normal esophagram  however, given the intermittent nature of her symptoms and mainly the finding of early satiety, obtain gastric emptying study to evaluate transit time  - AXR with no bowel obstruction. Unclear for stool burden given retained contrast from MBS. 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  GI will sign off. Please call back with any questions or concerns.     Alexa Cavazos MD  PGY-4, Gastroenterology Fellow  pager: [phone]

## 2020-09-29 NOTE — CHART NOTE - NSCHARTNOTEFT_GEN_A_CORE
Overnight and into the morning on 9/28, pt had two episodes of limb shaking, lasting 30 min - 1 hr, was completely conscious throughout. A video of one of the episodes was reviewed by the attending and was determined not to be consistent with a seizure, and likely was a pseudoseizure. She has had these episodes in the past and has had multiple EEGs which were normal. No further workup is warranted at this time. Patient is stable for discharge, however was refusing discharge until "we figure out what is going on". It was explained to her that she has a long and complex medical history, and her workup and treatment will continue as an outpatient, but there is no further testing or treatment that needs to be done as an inpatient. After refusing discharge, she was given a 24 hour notice.

## 2020-09-29 NOTE — PROGRESS NOTE ADULT - SUBJECTIVE AND OBJECTIVE BOX
GASTROENTEROLOGY PROGRESS NOTE  Patient seen and examined at bedside. No further shaking episodes. Does endorse nausea and abdominal fullness, which she attributes to yesterday's "episode of dysautonomia."     PERTINENT REVIEW OF SYSTEMS:  CONSTITUTIONAL: No weakness, fevers or chills  HEENT: No visual changes; No vertigo or throat pain   GASTROINTESTINAL: As above.  NEUROLOGICAL: No numbness or weakness  SKIN: No itching, burning, rashes, or lesions     Allergies    No Known Allergies    Intolerances    erythromycin (Stomach Upset)    MEDICATIONS:  MEDICATIONS  (STANDING):  buPROPion XL . 300 milliGRAM(s) Oral daily  chlorhexidine 2% Cloths 1 Application(s) Topical <User Schedule>  enoxaparin Injectable 40 milliGRAM(s) SubCutaneous every 24 hours  ivabradine 5 milliGRAM(s) Oral two times a day  lactated ringers. 1000 milliLiter(s) (100 mL/Hr) IV Continuous <Continuous>  lactated ringers. 1000 milliLiter(s) (60 mL/Hr) IV Continuous <Continuous>  pyridostigmine 60 milliGRAM(s) Oral three times a day  thyroid 15 milliGRAM(s) Oral <User Schedule>    MEDICATIONS  (PRN):  acetaminophen    Suspension .. 650 milliGRAM(s) Oral every 6 hours PRN Temp greater or equal to 38C (100.4F), Moderate Pain (4 - 6), Severe Pain (7 - 10)  diphenhydrAMINE   Injectable 25 milliGRAM(s) IV Push at bedtime PRN Insomnia  gabapentin 100 milliGRAM(s) Oral three times a day PRN muscle spasm    Vital Signs Last 24 Hrs  T(C): 36.8 (29 Sep 2020 05:46), Max: 36.8 (29 Sep 2020 05:46)  T(F): 98.2 (29 Sep 2020 05:46), Max: 98.2 (29 Sep 2020 05:46)  HR: 62 (29 Sep 2020 05:46) (62 - 75)  BP: 121/76 (29 Sep 2020 05:46) (121/76 - 144/91)  BP(mean): --  RR: 16 (29 Sep 2020 05:46) (16 - 16)  SpO2: 98% (29 Sep 2020 05:46) (98% - 98%)    09-28 @ 07:01  -  09-29 @ 07:00  --------------------------------------------------------  IN: 720 mL / OUT: 0 mL / NET: 720 mL      PHYSICAL EXAM:    General: Well developed; well nourished; in no acute distress  HEENT: MMM, conjunctiva and sclera clear  Gastrointestinal: Soft non-tender non-distended; Normal bowel sounds; No hepatosplenomegaly. No rebound or guarding  Skin: Warm and dry. No obvious rash    LABS:                        11.1   3.78  )-----------( 170      ( 28 Sep 2020 07:28 )             34.1     09-28    138  |  104  |  6<L>  ----------------------------<  94  3.6   |  26  |  0.57    Ca    8.9      28 Sep 2020 07:27  Phos  3.3     09-28  Mg     1.7     09-28                        RADIOLOGY & ADDITIONAL STUDIES:  Reviewed

## 2020-09-29 NOTE — PROGRESS NOTE ADULT - ATTENDING COMMENTS
Gastric emptying scan with 29% retention at 4 hours. Reportedly has a normal gastric emptying scan in the past, however per patient was not done for the full 4 hour period. The abnormal result suggests some delayed gastric motility related to POTS flare-ups/episodes, however patient is not completely convinced because she sometimes has GI symptoms at other times. Has a history of ineffective esophageal motility (per patient report) on a prior manometry study. Recommended she follow up with her outpatient GI who has previously done extensive work up. We did address her questions about doing an NGT, PEG tube, and TPN, but all of these options have side effects, and would always prefer enteric nutrition if possible. Discussed in detail with the patient the importance of working with a nutritionist. Promotility agents also have side effects which we did review in detail with the patient as well.

## 2020-09-29 NOTE — PROGRESS NOTE ADULT - PROVIDER SPECIALTY LIST ADULT
Gastroenterology
Gastroenterology
Internal Medicine
Internal Medicine
Neurology
Gastroenterology
Neurology
Neurology

## 2020-09-29 NOTE — PROGRESS NOTE ADULT - REASON FOR ADMISSION
IVIG infusion

## 2020-09-30 ENCOUNTER — TRANSCRIPTION ENCOUNTER (OUTPATIENT)
Age: 45
End: 2020-09-30

## 2020-09-30 VITALS
TEMPERATURE: 98 F | OXYGEN SATURATION: 100 % | DIASTOLIC BLOOD PRESSURE: 73 MMHG | SYSTOLIC BLOOD PRESSURE: 106 MMHG | RESPIRATION RATE: 16 BRPM | HEART RATE: 64 BPM

## 2020-09-30 LAB — SARS-COV-2 RNA SPEC QL NAA+PROBE: NEGATIVE — SIGNIFICANT CHANGE UP

## 2020-09-30 PROCEDURE — 36415 COLL VENOUS BLD VENIPUNCTURE: CPT

## 2020-09-30 PROCEDURE — 82784 ASSAY IGA/IGD/IGG/IGM EACH: CPT

## 2020-09-30 PROCEDURE — 83825 ASSAY OF MERCURY: CPT

## 2020-09-30 PROCEDURE — 83735 ASSAY OF MAGNESIUM: CPT

## 2020-09-30 PROCEDURE — 84100 ASSAY OF PHOSPHORUS: CPT

## 2020-09-30 PROCEDURE — 92611 MOTION FLUOROSCOPY/SWALLOW: CPT

## 2020-09-30 PROCEDURE — 74230 X-RAY XM SWLNG FUNCJ C+: CPT

## 2020-09-30 PROCEDURE — 78264 GASTRIC EMPTYING IMG STUDY: CPT

## 2020-09-30 PROCEDURE — 82390 ASSAY OF CERULOPLASMIN: CPT

## 2020-09-30 PROCEDURE — 92526 ORAL FUNCTION THERAPY: CPT

## 2020-09-30 PROCEDURE — 97161 PT EVAL LOW COMPLEX 20 MIN: CPT

## 2020-09-30 PROCEDURE — 99238 HOSP IP/OBS DSCHRG MGMT 30/<: CPT

## 2020-09-30 PROCEDURE — 82300 ASSAY OF CADMIUM: CPT

## 2020-09-30 PROCEDURE — 74019 RADEX ABDOMEN 2 VIEWS: CPT

## 2020-09-30 PROCEDURE — 80053 COMPREHEN METABOLIC PANEL: CPT

## 2020-09-30 PROCEDURE — 82175 ASSAY OF ARSENIC: CPT

## 2020-09-30 PROCEDURE — 85025 COMPLETE CBC W/AUTO DIFF WBC: CPT

## 2020-09-30 PROCEDURE — 85306 CLOT INHIBIT PROT S FREE: CPT

## 2020-09-30 PROCEDURE — 87635 SARS-COV-2 COVID-19 AMP PRB: CPT

## 2020-09-30 PROCEDURE — 84484 ASSAY OF TROPONIN QUANT: CPT

## 2020-09-30 PROCEDURE — 80048 BASIC METABOLIC PNL TOTAL CA: CPT

## 2020-09-30 PROCEDURE — 83655 ASSAY OF LEAD: CPT

## 2020-09-30 PROCEDURE — 93005 ELECTROCARDIOGRAM TRACING: CPT

## 2020-09-30 PROCEDURE — 82607 VITAMIN B-12: CPT

## 2020-09-30 PROCEDURE — 85027 COMPLETE CBC AUTOMATED: CPT

## 2020-09-30 PROCEDURE — A9541: CPT

## 2020-09-30 PROCEDURE — 82962 GLUCOSE BLOOD TEST: CPT

## 2020-09-30 RX ADMIN — BUPROPION HYDROCHLORIDE 300 MILLIGRAM(S): 150 TABLET, EXTENDED RELEASE ORAL at 11:15

## 2020-09-30 RX ADMIN — CHLORHEXIDINE GLUCONATE 1 APPLICATION(S): 213 SOLUTION TOPICAL at 07:03

## 2020-09-30 RX ADMIN — PYRIDOSTIGMINE BROMIDE 60 MILLIGRAM(S): 60 SOLUTION ORAL at 07:04

## 2020-09-30 RX ADMIN — IVABRADINE 5 MILLIGRAM(S): 7.5 TABLET, FILM COATED ORAL at 07:03

## 2020-09-30 RX ADMIN — Medication 650 MILLIGRAM(S): at 03:11

## 2020-09-30 RX ADMIN — Medication 650 MILLIGRAM(S): at 01:58

## 2020-09-30 NOTE — DISCHARGE NOTE NURSING/CASE MANAGEMENT/SOCIAL WORK - PATIENT PORTAL LINK FT
You can access the FollowMyHealth Patient Portal offered by [facility] by registering at the following website: [email or web address]. By joining [facility] FollowMyHealth portal, you will also be able to view your health information using other applications (apps) compatible with our system.

## 2020-09-30 NOTE — DISCHARGE NOTE NURSING/CASE MANAGEMENT/SOCIAL WORK - NSDCFUADDAPPT_GEN_ALL_CORE_FT
Please follow up with neurologist, Dr. Najjar on an outpatient basis.     Please follow up with your outpatient gastroenterologist in [facility].

## 2020-10-07 DIAGNOSIS — I49.8 OTHER SPECIFIED CARDIAC ARRHYTHMIAS: ICD-10-CM

## 2020-10-07 DIAGNOSIS — G90.1 FAMILIAL DYSAUTONOMIA [RILEY-DAY]: ICD-10-CM

## 2020-10-07 DIAGNOSIS — K31.89 OTHER DISEASES OF STOMACH AND DUODENUM: ICD-10-CM

## 2020-10-07 DIAGNOSIS — K59.00 CONSTIPATION, UNSPECIFIED: ICD-10-CM

## 2020-10-07 DIAGNOSIS — E06.3 AUTOIMMUNE THYROIDITIS: ICD-10-CM

## 2020-10-07 DIAGNOSIS — E43 UNSPECIFIED SEVERE PROTEIN-CALORIE MALNUTRITION: ICD-10-CM

## 2020-10-07 DIAGNOSIS — R07.9 CHEST PAIN, UNSPECIFIED: ICD-10-CM

## 2020-10-07 DIAGNOSIS — Z86.19 PERSONAL HISTORY OF OTHER INFECTIOUS AND PARASITIC DISEASES: ICD-10-CM

## 2020-10-07 DIAGNOSIS — L03.032 CELLULITIS OF LEFT TOE: ICD-10-CM

## 2020-10-07 DIAGNOSIS — G62.89 OTHER SPECIFIED POLYNEUROPATHIES: ICD-10-CM

## 2021-03-22 NOTE — PHYSICAL THERAPY INITIAL EVALUATION ADULT - RISK REDUCTION/PREVENTION, PT EVAL
LM informing pt that we received her [facility] papers and per Dr. Gagnon, she needs to make an appt in order to complete forms.  Pt to call back an schedule an appt for tomorrow since her papers deadline are 03/24/2021.   risk factors

## 2021-05-04 NOTE — DISCHARGE NOTE NURSING/CASE MANAGEMENT/SOCIAL WORK - NSFLUVACAGEDISCH_IMM_ALL_CORE
TRINIDAD Pyle Nazih Nurse Msg Pool 2 minutes ago (4:00 PM)   ELAINE Llanes is recommending an appt with PCP as soon as possible.  Please reach out to the patient to arrange.      Routing comment         Adult

## 2021-11-12 NOTE — PATIENT PROFILE ADULT - NSASFUNCLEVELADLTRANSFER_GEN_A_NUR
YouTube:  How to prevent carbohydrate withdrawal with Dr Yvan Wilde    MiserWareube videos to help you exercise regularly with only 15 minutes twice weekly:    Dr. Pavel Alfaro Protecting Your NEST Video Podcast Episode 56: 15 minutes twice weekly = fit on Dr Yvan Wilde's [employer] channel  View here: https://[email or web address] [email or web address]/watch?v=[email or web address]    Once you view this video, decide which approach you want to pursue and use his book or videos below to get started. All the following links are also in the above videos show notes.    Using Bands with Сергей Spicer on [employer] channel entitled: HIT for metabolic health on the road - 15 minutes twice a week:   [email or web address] [email or web address]/watch?v=[email or web address]    Using Total Gym on [employer] channel entitled: Dr. Goldman SMaRT Workout on a Total Gym:  https://[email or web address] [email or web address]/watch?v=[email or web address]    Using Gym equipment with Marion Boucher on [employer]'s YouTube channel entitled Smart Workout at Dr. Zhao's Gym - Marion Boucher:   https://[email or web address] [email or web address]/watch?v=[email or web address]              VEICK Resistance Bands Set,Workout Bands,Exercise Bands,5 Tube Fitness Bands with Door [place],Handles,Portable Bag,Legs Ankle Straps for Musle Training, Physical Therapy, Shape Body,Home Workout    Resources to help you along your journey to achieve metabolic health (cause of 70% of chronic disease)    Visit my website: [email or web address]    Watch two videos on website with link to [employer]iserW[employer] to fully understand how to start you low-carb journey:    Why Adopt a Low-Carb Diet: the science  How to Adopt a Low-Carb Diet    Subscribe to my MiserWareube channel  Subscribe to Podcast using an Dru ([employer], [employer], Tel[employer], etc.).   Watch first episode the fully understand the meaning of the NEST and ROPE acronym.    On website, check out DIET DOCTOR link at the top to get recipes, meal planning, grocery lists, etc.  Recipes at bottom of page    On website, check out RECIPES link at the top  for my articles on my [place] corner site.    Low carb corner on website: Look at bottom for handout    N.E.S.T. & R.O.P.E. acronym helps identify and resolve the root causes of chronic disease  You need your rope to climb up to your nest: protect both!  Nutrition (LCHF & Intermittent Fasting)  Exercise  Stress reduction & more Sleep  Think positive and avoid Trauma    Relationships (add value)  Organisms (avoid the harmful ones)  Pollutants (avoid exposure to)  Emotions (protect) and life Experiences    Today we discussed:    How long have you struggled with your condition?  We review your meds to determine if any cause weight gain or increase your risk for diabetes (since some meds increase weight like insulin or diabetes meds)  We discussed if you monitor your weight or check your glucose values  We reviewed or discussed your labs  We also reviewed your diet    What we will monitor over time:    Height, weight, and waist circumference  Blood pressure  Lab tests including: Hemoglobin A1c, kidney function (creatinine/microalbumin), cholesterol, liver function (GGT/ALT), thyroid, fasting blood glucose, B12 levels, and any other tests as needed.    Carbohydrates:    • All Carbohydrates are digested to monosaccahrrides of glucose  fructose and galactose.  • Carbohydrates cause insulin levels to rise.  • Chronic insulin levels cause increase growth in fat  • Insulin resistance is primarily caused by fat deposition in the liver and  muscles  • To overcome insulin resistance the body produces more insulin  • Insulin's primary function is to prevent the liver making more  glucose; it prevents the breakdown of fat, and also prevents the  breakdown of proteins.  • When glucose levels rise excessively, insulin also converts glucose into  fat, which exacerbates the problem, creating Non-alcoholic Fatty Liver  disease and further weight gain.    High glucose levels in cells (endothelial cells, nerve cells) cause the release  of free radicals and inflammation which can initiate many of the diseases we associate with diabetes.    How to calculate net carbohydrates:  To calculate net carbs, you would subtract Fiber from the Total Carbohydrates on the nutrition label. Net Carbs = Total Carbohydrates - Dietary Fiber        Recommendations for those wanting to achieve their ideal body weight and reverse Type 2 diabetes:    • Eat no more than 50g/day, and for some it may mean even less (in diabetics  <20g). Success rate is 100% in studies at <20g. Success rate is 50% at <50g.   • Each person needs to find their level.  • For patients with diabetes it is critical to regularly check their glucose levels before and about 90mins after meals and to adjust insulin and medication accordingly, in conjunction with myself, the pharmacy chronic disease management team, or the nutritionist or diabetic educator.        Review my low carb diet recommendations below to get you started:  Check [employer] Low Carb site menu:   www.genaw.com/lowcarb/    Learn how to read labels with focus on net carbs: Total  carbohydrates minus fiber = Net carbohydrates.    Reduce your carbohydrate intake to less than 50 carbohydrates  per day.    Increase consumption of FAT in your diet (best micronutrient for  diabetics, borderline diabetics, and weight loss)    Reduce processed foods in your diet (eat real food)    Consult with your healthcare professional since medication  dose reductions will be needed when you lower starchy  carbohydrate consumption.    Starchy (High Carb) Vegetables to avoid:  The starchier and sweeter vegetables: Beets, Peas, Winter  Squashes ([place]), Parsnips, Potatoes in all forms, Corn,  Plantains    Try these low-carb substitutes instead:  Mashed Cauliflower, Cauliflower \"Rice\" , Spaghetti Squash,  Zucchini Noodles, Cauliflower \"Potato\" Salad  20-50 Grams Per Day    This is the perfect range for people who need to lose weight fast,  or have obesity or  diabetes. When eating less than 50 grams per  day, your body will get into ketosis, supplying energy for the brain  via so-called ketone bodies. This is likely to kill your appetite and  cause you to lose weight automatically.    Low-Carb High-Fat Lifestyle basics by Nutrition Authority    What to eat:  Meat: Beef, lamb, pork, chicken and others. Grass-fed is  best.  Fish: [place], trout, katt and many others. Wild-caught  fish is best.  Eggs: Omega-3 enriched or pastured eggs are best.  Vegetables: Spinach, broccoli, cauliflower, carrots and many  others.  Fruits: Apples, oranges, pears, blueberries, strawberries.  Nuts and Seeds: Almonds, walnuts, sunflower seeds, etc.  High-Fat Dairy: Cheese, butter, heavy cream, yogurt.  Fats and Oils: Coconut oil, butter, lard, olive oil and cod fish  liver oil.  Legumes: Lentils, black beans, franco beans, etc. (If you can  tolerate them).    In moderation:    Dark Chocolate: Choose organic brands with 70% cocoa or  higher.  Wine: If you like wine, use terrence with no added sugar or  carbs.    What to drink:    Coffee, Tea, Water, and Sugar-free carbonated beverages,  like sparkling water.  What to avoid:  Sugar: Soft drinks, fruit juices, agave (contains too much  fructose), candy, ice cream and many others.  Gluten Grains: Wheat, spelt, barley and rye. Includes  breads and pastas.  Trans Fats: “Hydrogenated” or “partially hydrogenated” oils.  High Omega-6 Seed- and Vegetable Oils: Cottonseed-,  soybean-, sunflower-, grapeseed-, corn-, safflower and  canola oils.  Artificial Sweeteners: Aspartame, Saccharin, Sucralose,  Cyclamates and Acesulfame Potassium. Use Stevia instead.  “Diet” and “Low-Fat” Products: Many dairy products,  cereals, crackers, etc.  Highly Processed Foods: If it looks like it was made in a  factory, don’t eat it.Advertisement   A Sample less than 50 grams of carbs  menu for one week.  Although this sample diet is provided for your convenience, I  suggest  you start with what you currently eat and gradually  replace the high carb foods with alternatives like cauliflower and  zucchini.  Monday  Breakfast: Omelet with various vegetables, fried in butter or  coconut oil.  Lunch: Full-fat yogurt with blueberries and a handful of  almonds.  Dinner: Cheeseburger (no bun), served with vegetables and  salsa sauce.  Tuesday  Breakfast: [place] and eggs.  Lunch: Leftover burgers and veggies from the night before.  Dinner: [place] with butter and vegetables.  Wednesday  Breakfast: Eggs and vegetables, fried in butter or coconut  oil.  Lunch: Shrimp salad with some olive oil.  Dinner: Grilled chicken with vegetables.  Thursday  Breakfast: Omelet with various vegetables, fried in butter or  coconut oil.  Lunch: Smoothie with coconut milk, berries, almonds and  protein powder.  Dinner: Steak and veggies.  Friday  Breakfast: [place] and Eggs.  Lunch: Chicken salad with some olive oil.  Dinner: Pork chops with vegetables.  Saturday  Breakfast: Omelet with various veggies.  Lunch: Grass-fed yogurt with berries, coconut flakes and a  handful of walnuts.  Dinner: Meatballs with vegetables.  Sunday  Breakfast: [place] and Eggs.  Lunch: Smoothie with coconut milk, a bit of heavy cream,  chocolate-flavoured protein powder and berries.  Dinner: Grilled chicken wings with some raw spinach on the  side.  As you can see, a low carb diet is very simple to switch to. It will  reverse excessive weight gain and diabetes like never before.    Learn how to reverse your borderline diabetes or diabetes with my book which you can find on Amazon or ordered at your local bookstore:    Follow me on You[employer] Technology[employer] and click subscribe and the bell so you won't miss any of my videos    Fix Your Diet, Fix Your Diabetes Your Dietary Solution to  Reversing Diabetes by Dr Yvan Wilde which can be purchased on Amazon    In addition to my book, here are other low carbohydrate recommended books:    Dr Lopez  [place]  [email or web address].dietdoctor.com/  The Complete Guide to Fasting Dr Marino Stauffer and Gordon Álvarez ([place]’s Diet Doctor)  http://denversdietdoctor.com  Dr Abdifatah Echeverria (Low Carb [place])  [email or web address]  Art and Science of Low Carbohydrate Living by Dario Newton and  Jessy  Real Meal Revolution by Kaleb Buchanan  The Big Fat Surprise by Leilani Stewart  Good Calories, Bad Calories by Elmer Knox’s Diabetes Solution by Napoleon Knox      Put healthy fats in your mouth (mouth = boca in [place]) and on you veggies:   Butter  Olive Oil  Coconut Oil  Avocado Oil    NEST:  Nutrition: low carb high fat diet (Intermittent fasting) (you can't out exercise a bad diet)  Exercise: HIIT (High intensity Interval Training)  Stress reduction and increased sleep  Teach others or have teacher on your team    Diet:   Eat Breakfast (example: cauliflower rice, eggs, sausage, and philippe)  Or fast  MCT oil can be added to your tea/coffee (1 tsp daily up to 1 tablespoon daily)    Lunch:  2 boiled eggs, berries, nuts/seed, Avocado  Salad: salmon or chicken, eggs, avocado, oil based dressing, olives    Dinner:  Animal plus 2 non starchy veggies.    Cook 3 times per week for dinner:    Sunday: 3 days   Wednesday and Friday: 2 days  Cook must meals by baking, broiling, roasting, or grilling    How to start a ketogenic diet:    Week 1: Avoid processed (fast) foods, start cooking at home, start cleaning out the pantry of temptations (chips, etc).  Week 2: No carbs/sugar in your drinks. Drink water, tea, or coffee only.  Week 3: No white foods like pasta, rice, potatoes, or bread.  Week 4: Avoid grains  Week 5: Add quality fats/oils to your food/veggies (BOCA: butter, olive oil, coconut oil, and avocado oil). Eat fat to burn fat.  Week 6: Use the Carb Manager Dru on your phone or computer in the Dru store or Calorie FAT and Carb Counter book to keep your daily carbs under 50 grams  daily.                            [facility]   Address   [address]  [address] [place] [address]   Phone Number   [phone]   Fax   [phone] [address] [address]   [phone]   [phone]     [address], [address]   [place], [place]   [phone]     Anselmo Moise MD   [address]   [place], [place]   [phone]   [phone]     Dx: blood in urine   Number of visits: 3      Family:    Adult relationship books:  The 5 Love Languages: The Secret to Love that Lasts  by Elmer Lynne    The Seven Principles for Making Marriage Work: A Practical Guide from the Country's Foremost Relationship Expert  by Yandel Licona PhD and Rosa Velarde    Children:  Children Are From [facility] by Yandel Ribeiro    Spiritual:    The Shack by Eulogio Valadez    Financial:    The Total Money Makeover by Nathan Mosley  Show Dru and M-KOPA Dru    Mind and Intellectual (Any topic that helps you to grow as a person):    7 Habits of Highly Effective People by Kenton Kong    Physical/Health (Nutrition/stress reduction/sleep/exercise):    Sleep Smarter by Julio Cesar Bunch    Personal/Social:    Boundaries by Dr. Haile Urbina    Work/Career:    The Proximity Principle by Macario Rabago           0 = independent

## 2022-08-04 NOTE — PROGRESS NOTE ADULT - PROBLEM SELECTOR PROBLEM 5
Detail Level: Detailed
Hashimoto's thyroiditis
General Sunscreen Counseling: I recommended a broad spectrum sunscreen with a SPF of 30 or higher.  I explained that SPF 30 sunscreens block approximately 97 percent of the sun's harmful rays.  Sunscreens should be applied at least 15 minutes prior to expected sun exposure and then every 2 hours after that as long as sun exposure continues. If swimming or exercising sunscreen should be reapplied every 45 minutes to an hour after getting wet or sweating.  One ounce, or the equivalent of a shot glass full of sunscreen, is adequate to protect the skin not covered by a bathing suit. I also recommended a lip balm with a sunscreen as well. Sun protective clothing can be used in lieu of sunscreen but must be worn the entire time you are exposed to the sun's rays.
Hashimoto's thyroiditis
Products Recommended: Zinc oxide only avoid oxybenzone
Small fiber neuropathy
Hashimoto's thyroiditis
Hashimoto's thyroiditis

## 2023-02-22 NOTE — PROGRESS NOTE ADULT - ATTENDING COMMENTS
Patient inquired about blood work the resident Dr. Campbell ordered yesterday. She explained that Dr. Campbell ordered vitamin B12 and a metal screen after telling her that she had calcifications on her CTH that needed to be evaluated.     I explained to the patient that I believe Dr. Campbell incorrectly ordered these labs which were intended for another patient on the unit who had the CT findings. Mrs. Matias did not have a CTH on this admission and I explained that she does not have calcifications in her brain.  I apologized on the behalf of Dr. Campbell for the confusion and offered to cancel these two lab tests which were sent in error. Patient verbalized that she would like to proceed with testing the blood any way. Skyla Robles (wife)

## 2023-04-22 NOTE — PHYSICAL THERAPY INITIAL EVALUATION ADULT - ADDITIONAL COMMENTS
was in rehab ~16 weeks, barely ambulating or sitting in a chair, primarily bedbound, unable to sit > 30 min 2/2 increasing muscle spasms. Prior to rehab, patient living with children in a house, 2 flights, independent prior to arrival, no use of AD, history of multiple falls 2/2 syncope details

## 2023-05-18 NOTE — PROGRESS NOTE ADULT - PROBLEM SELECTOR PLAN 7
Detail Level: Simple
Detail Level: Zone
1) PCP Contacted on Admission: (Y/N) --> Name & Phone #: Dr. Leobardo Ferrer  2) Date of Contact with PCP:  3) PCP Contacted at Discharge: (Y/N, N/A)  4) Summary of Handoff Given to PCP:   5) Post-Discharge Appointment Date and Location:
1) PCP Contacted on Admission: (Y/N) --> Name & Phone #: Dr. Leobardo Ferrer  2) Date of Contact with PCP:  3) PCP Contacted at Discharge: (Y/N, N/A)  4) Summary of Handoff Given to PCP:   5) Post-Discharge Appointment Date and Location:

## 2023-11-28 NOTE — DISCHARGE NOTE NURSING/CASE MANAGEMENT/SOCIAL WORK - NSTRANSFERBELONGINGSDISPO_GEN_A_NUR
Pt verified by name and .  Pt calling states she started using divigel in April.  Pt calling states she is having breast tenderness, anxiety.  Pt states her endometriosis specialist does not want her on estrogen on,.  Pt states she stopped taking progesterone over a month ago.  Pt is aware nurse will send message to Mitesh Guerrero for her to advise.  Pt has no questions at this time.     with patient

## 2024-04-02 NOTE — DISCHARGE NOTE NURSING/CASE MANAGEMENT/SOCIAL WORK - NURSING SECTION COMPLETE
[Good] : ~his/her~  mood as  good [Yes] : Yes [Monthly or less (1 pt)] : Monthly or less (1 point) [1 or 2 (0 pts)] : 1 or 2 (0 points) [Never (0 pts)] : Never (0 points) [No] : In the past 12 months have you used drugs other than those required for medical reasons? No [0] : 2) Feeling down, depressed, or hopeless: Not at all (0) [PHQ-2 Negative - No further assessment needed] : PHQ-2 Negative - No further assessment needed [Patient reported PAP Smear was normal] : Patient reported PAP Smear was normal [None] : None Patient/Caregiver provided printed discharge information. [With Family] : lives with family [Employed] : employed [Sexually Active] : sexually active [Fully functional (bathing, dressing, toileting, transferring, walking, feeding)] : Fully functional (bathing, dressing, toileting, transferring, walking, feeding) [Fully functional (using the telephone, shopping, preparing meals, housekeeping, doing laundry, using] : Fully functional and needs no help or supervision to perform IADLs (using the telephone, shopping, preparing meals, housekeeping, doing laundry, using transportation, managing medications and managing finances) [Smoke Detector] : smoke detector [Carbon Monoxide Detector] : carbon monoxide detector [Safety elements used in home] : safety elements used in home [Seat Belt] :  uses seat belt [Sunscreen] : uses sunscreen [Single] : single [Feels Safe at Home] : Feels safe at home [Never] : Never [de-identified] : denies  [de-identified] : GYN ,  card , ENT  [de-identified] : reg  [de-identified] : gym  [Audit-CScore] : 1 [MGR6Agswv] : 0 [Change in mental status noted] : No change in mental status noted [Language] : denies difficulty with language [Reports changes in hearing] : Reports no changes in hearing [Reports changes in vision] : Reports no changes in vision [Reports changes in dental health] : Reports no changes in dental health [MammogramComments] : grandmother- maternal BRCA  [PapSmearDate] : 2023 [FreeTextEntry2] : RN [PapSmearComments] : always been wnl- GYN - Dr Ana Morgan-  [de-identified] : us due for Opth [de-identified] : sees dentist,  [de-identified] : derm has seen- removed skin tags -  [FreeTextEntry4] : -  - shannon Gandhi

## 2024-08-02 PROBLEM — N92.0 INTERMENSTRUAL SPOTTING: Status: ACTIVE | Noted: 2018-08-02

## 2025-05-19 NOTE — PROGRESS NOTE ADULT - PROBLEM/PLAN-3
DISPLAY PLAN FREE TEXT
Have Your Spot(S) Been Treated In The Past?: has not been treated
Hpi Title: Evaluation of Skin Lesions

## 2025-09-02 ENCOUNTER — APPOINTMENT (OUTPATIENT)
Dept: UROLOGY | Facility: CLINIC | Age: 50
End: 2025-09-02
Payer: MEDICARE

## 2025-09-02 VITALS
HEART RATE: 87 BPM | SYSTOLIC BLOOD PRESSURE: 124 MMHG | TEMPERATURE: 98.5 F | OXYGEN SATURATION: 95 % | HEIGHT: 59 IN | BODY MASS INDEX: 19.15 KG/M2 | WEIGHT: 95 LBS | DIASTOLIC BLOOD PRESSURE: 82 MMHG

## 2025-09-02 DIAGNOSIS — N39.0 URINARY TRACT INFECTION, SITE NOT SPECIFIED: ICD-10-CM

## 2025-09-02 DIAGNOSIS — N13.30 UNSPECIFIED HYDRONEPHROSIS: ICD-10-CM

## 2025-09-02 DIAGNOSIS — Z86.39 PERSONAL HISTORY OF OTHER ENDOCRINE, NUTRITIONAL AND METABOLIC DISEASE: ICD-10-CM

## 2025-09-02 DIAGNOSIS — Z87.19 PERSONAL HISTORY OF OTHER DISEASES OF THE DIGESTIVE SYSTEM: ICD-10-CM

## 2025-09-02 PROCEDURE — G2211 COMPLEX E/M VISIT ADD ON: CPT

## 2025-09-02 PROCEDURE — 99204 OFFICE O/P NEW MOD 45 MIN: CPT

## 2025-09-05 DIAGNOSIS — R39.9 UNSPECIFIED SYMPTOMS AND SIGNS INVOLVING THE GENITOURINARY SYSTEM: ICD-10-CM

## 2025-09-05 LAB — BACTERIA UR CULT: ABNORMAL

## 2025-09-12 RX ORDER — CIPROFLOXACIN HYDROCHLORIDE 500 MG/1
500 TABLET, FILM COATED ORAL
Qty: 10 | Refills: 0 | Status: ACTIVE | COMMUNITY
Start: 2025-09-12 | End: 1900-01-01

## 2025-09-15 ENCOUNTER — NON-APPOINTMENT (OUTPATIENT)
Age: 50
End: 2025-09-15

## 2025-09-18 ENCOUNTER — NON-APPOINTMENT (OUTPATIENT)
Age: 50
End: 2025-09-18